# Patient Record
Sex: FEMALE | Race: WHITE | NOT HISPANIC OR LATINO | Employment: FULL TIME | URBAN - METROPOLITAN AREA
[De-identification: names, ages, dates, MRNs, and addresses within clinical notes are randomized per-mention and may not be internally consistent; named-entity substitution may affect disease eponyms.]

---

## 2017-04-11 ENCOUNTER — OFFICE VISIT (OUTPATIENT)
Dept: FAMILY MEDICINE | Facility: CLINIC | Age: 42
End: 2017-04-11
Payer: COMMERCIAL

## 2017-04-11 VITALS
HEIGHT: 65 IN | BODY MASS INDEX: 29.49 KG/M2 | RESPIRATION RATE: 14 BRPM | OXYGEN SATURATION: 98 % | HEART RATE: 76 BPM | TEMPERATURE: 99.7 F | WEIGHT: 177 LBS | SYSTOLIC BLOOD PRESSURE: 126 MMHG | DIASTOLIC BLOOD PRESSURE: 66 MMHG

## 2017-04-11 DIAGNOSIS — H01.004 BLEPHARITIS OF LEFT UPPER EYELID, UNSPECIFIED TYPE: Primary | ICD-10-CM

## 2017-04-11 DIAGNOSIS — D84.9 IMMUNE DEFICIENCY DISORDER (H): ICD-10-CM

## 2017-04-11 PROBLEM — Z13.6 CARDIOVASCULAR SCREENING; LDL GOAL LESS THAN 160: Status: ACTIVE | Noted: 2017-04-11

## 2017-04-11 PROBLEM — T78.40XA ALLERGIC STATE: Status: ACTIVE | Noted: 2017-04-11

## 2017-04-11 PROCEDURE — 99203 OFFICE O/P NEW LOW 30 MIN: CPT | Performed by: PHYSICIAN ASSISTANT

## 2017-04-11 RX ORDER — OMEGA-3S/DHA/EPA/FISH OIL 980-1400MG
2 CAPSULE,DELAYED RELEASE (ENTERIC COATED) ORAL DAILY
COMMUNITY

## 2017-04-11 RX ORDER — ELECTROLYTES/DEXTROSE
1 SOLUTION, ORAL ORAL DAILY
COMMUNITY
End: 2022-12-21

## 2017-04-11 RX ORDER — SULFACETAMIDE SODIUM 100 MG/ML
1 SOLUTION/ DROPS OPHTHALMIC
Qty: 1 BOTTLE | Refills: 0 | Status: SHIPPED | OUTPATIENT
Start: 2017-04-11 | End: 2017-04-18

## 2017-04-11 NOTE — PATIENT INSTRUCTIONS
Blepharitis    Blepharitis is an inflammation of the eyelid. It results in swelling of the eyelids, and it is usually caused by a bacterial infection or a skin condition. Blepharitis is a common eye condition. There are two types. Anterior blepharitis occurs where the eyelashes are attached (outside front edge of the eye). Posterior blepharitis affects the inner edge of the eyelid that touches the eyeball.  In addition to swollen eyelids, symptoms of blepharitis can include thick, yellow, dandruff-like scales that stick to the eyelid. There may be oily patches on the eyelid. The eyelashes may be crusted (with dandruff-like scales) when you wake up from sleeping. The irritated area may itch. The eyelids may be red. The eyes can be red and burn or sting. The eyes may tear a lot, or be dry. You can become sensitive to light or have blurred vision. Symptoms of blepharitis can cause irritability.  Blepharitis is a chronic condition and difficult to cure. Even with successful treatment, recurrences are common. Good hygiene and home treatments (in the Home care section below) can improve your condition.  Causes  Causes of blepharitis may include:    Problems with the oil glands in the eyelid (meibomian glands)    Dandruff of the scalp and eyebrows (seborrheic dermatitis)    Acne rosacea (a skin condition that causes redness of the face, and other symptoms)    Eyelash mites (tiny organisms in the eyelash follicles)    Allergic reactions to cosmetics or medicines  Home care  Medicine: The healthcare provider may prescribe an antibiotic eye drops or ointment, artificial tears, and/or steroid eye drops. Follow all instructions for using these medicines. Use all medicines as directed. If you have pain, take medicine as advised by the healthcare provider.    Wash your hands carefully with soap and warm water before and after caring for your eyes.    Apply a warm compress or a warm, moist washcloth to the eyelids for 1 minute,  2 to 3 times a day, to loosen the crust. Then, wipe away scales or crust from the eyelids.    After applying the warm compress, gently scrub the base of the eyelashes for almost 15 seconds per eyelid. To do this, close your eyes and use a moist eyelid cleansing wipe, clean washcloth, or cotton swab. Ask your healthcare provider about products (such as nonirritating baby shampoo) to use to help clean the eyelids.    You may be instructed to gently massage your eyelids to help unblock the eyelid glands. Follow all instructions given by the healthcare provider.    Unless told otherwise, on a regular basis, with eyes closed, clean your eyelids as directed by the healthcare provider. Blepharitis can be an ongoing problem.    Do not wear eye makeup until the inflammation goes away, or as directed by your healthcare provider.     Unless told otherwise, stop using contact lenses until you complete treatment for the condition.    Wash your hands regularly to help prevent dirt and bacteria from coming in contact with your eyelid.  Follow-up care  Follow up with your healthcare provider, or as advised. Your healthcare provider may refer you to an eye specialist (an optometrist or ophthalmologist) for further evaluation and treatment.  When to seek medical advice  Call your healthcare provider right away if any of these occur:    Increase in redness of the white part of the eye    Increase in swelling, redness, irritation, or pain of the eyelids    Eye pain    Change in vision (trouble seeing or blurring)    Drainage (pus, blood) from the eyelid    Fever of 100.4 F (38 C) or higher, or as directed by your healthcare provider    7827-0196 [employer]. [address] [place] [address], [place] [address]. All rights reserved. This information is not intended as a substitute for professional medical care. Always follow your healthcare professional's instructions.

## 2017-04-11 NOTE — NURSING NOTE
"Chief Complaint   Patient presents with     [facility]     new pt     Eye Problem       Initial /66 (BP Location: Right arm, Patient Position: Chair, Cuff Size: Adult Regular)  Pulse 76  Temp 99.7  F (37.6  C) (Oral)  Resp 14  Ht 5' 5.25\" (1.657 m)  Wt 177 lb (80.3 kg)  SpO2 98%  Breastfeeding? No  BMI 29.23 kg/m2 Estimated body mass index is 29.23 kg/(m^2) as calculated from the following:    Height as of this encounter: 5' 5.25\" (1.657 m).    Weight as of this encounter: 177 lb (80.3 kg).  BP completed using cuff size: regular    Health Maintenance that is potentially due pending provider review:  Health Maintenance Due   Topic Date Due     TETANUS IMMUNIZATION (SYSTEM ASSIGNED)  03/25/1995     PAP SCREENING Q3 YR (SYSTEM ASSIGNED)  03/25/1998         Pt advised to make PX appt.  "

## 2017-04-11 NOTE — MR AVS SNAPSHOT
After Visit Summary   4/11/2017    May Aldana    MRN: [id number]           Patient Information     Date Of Birth          1975        Visit Information        Provider Department      4/11/2017 3:20 PM Ean Hanks PA-C [facility]        Today's Diagnoses     Blepharitis of left upper eyelid, unspecified type    -  1      Care Instructions      Blepharitis    Blepharitis is an inflammation of the eyelid. It results in swelling of the eyelids, and it is usually caused by a bacterial infection or a skin condition. Blepharitis is a common eye condition. There are two types. Anterior blepharitis occurs where the eyelashes are attached (outside front edge of the eye). Posterior blepharitis affects the inner edge of the eyelid that touches the eyeball.  In addition to swollen eyelids, symptoms of blepharitis can include thick, yellow, dandruff-like scales that stick to the eyelid. There may be oily patches on the eyelid. The eyelashes may be crusted (with dandruff-like scales) when you wake up from sleeping. The irritated area may itch. The eyelids may be red. The eyes can be red and burn or sting. The eyes may tear a lot, or be dry. You can become sensitive to light or have blurred vision. Symptoms of blepharitis can cause irritability.  Blepharitis is a chronic condition and difficult to cure. Even with successful treatment, recurrences are common. Good hygiene and home treatments (in the Home care section below) can improve your condition.  Causes  Causes of blepharitis may include:    Problems with the oil glands in the eyelid (meibomian glands)    Dandruff of the scalp and eyebrows (seborrheic dermatitis)    Acne rosacea (a skin condition that causes redness of the face, and other symptoms)    Eyelash mites (tiny organisms in the eyelash follicles)    Allergic reactions to cosmetics or medicines  Home care  Medicine: The healthcare provider may prescribe an  antibiotic eye drops or ointment, artificial tears, and/or steroid eye drops. Follow all instructions for using these medicines. Use all medicines as directed. If you have pain, take medicine as advised by the healthcare provider.    Wash your hands carefully with soap and warm water before and after caring for your eyes.    Apply a warm compress or a warm, moist washcloth to the eyelids for 1 minute, 2 to 3 times a day, to loosen the crust. Then, wipe away scales or crust from the eyelids.    After applying the warm compress, gently scrub the base of the eyelashes for almost 15 seconds per eyelid. To do this, close your eyes and use a moist eyelid cleansing wipe, clean washcloth, or cotton swab. Ask your healthcare provider about products (such as nonirritating baby shampoo) to use to help clean the eyelids.    You may be instructed to gently massage your eyelids to help unblock the eyelid glands. Follow all instructions given by the healthcare provider.    Unless told otherwise, on a regular basis, with eyes closed, clean your eyelids as directed by the healthcare provider. Blepharitis can be an ongoing problem.    Do not wear eye makeup until the inflammation goes away, or as directed by your healthcare provider.     Unless told otherwise, stop using contact lenses until you complete treatment for the condition.    Wash your hands regularly to help prevent dirt and bacteria from coming in contact with your eyelid.  Follow-up care  Follow up with your healthcare provider, or as advised. Your healthcare provider may refer you to an eye specialist (an optometrist or ophthalmologist) for further evaluation and treatment.  When to seek medical advice  Call your healthcare provider right away if any of these occur:    Increase in redness of the white part of the eye    Increase in swelling, redness, irritation, or pain of the eyelids    Eye pain    Change in vision (trouble seeing or blurring)    Drainage (pus,  "blood) from the eyelid    Fever of 100.4 F (38 C) or higher, or as directed by your healthcare provider    1921-5687 [employer]. [address] [place] [address], [place] [address]. All rights reserved. This information is not intended as a substitute for professional medical care. Always follow your healthcare professional's instructions.              Follow-ups after your visit        Who to contact     If you have questions or need follow up information about today's clinic visit or your schedule please contact [facility] directly at [phone].  Normal or non-critical lab and imaging results will be communicated to you by mSpokehart, letter or phone within 4 business days after the clinic has received the results. If you do not hear from us within 7 days, please contact the clinic through mSpokehart or phone. If you have a critical or abnormal lab result, we will notify you by phone as soon as possible.  Submit refill requests through WebThriftStore or call your pharmacy and they will forward the refill request to us. Please allow 3 business days for your refill to be completed.          Additional Information About Your Visit        MyChart Information     Web[employer]riftStore lets you send messages to your doctor, view your test results, renew your prescriptions, schedule appointments and more. To sign up, go to [email or web address] . Click on \"Log in\" on the left side of the screen, which will take you to the Welcome page. Then click on \"Sign up Now\" on the right side of the page.     You will be asked to enter the access code listed below, as well as some personal information. Please follow the directions to create your username and password.     Your access code is: [email or web address]  Expires: 7/10/2017  3:37 PM     Your access code will  in 90 days. If you need help or a new code, please call your [facility] or [phone].        Care EveryWhere ID     This is your Care EveryWhere ID. This could be " "used by other organizations to access your [place] medical records  [id number]        Your Vitals Were     Pulse Temperature Respirations Height Pulse Oximetry Breastfeeding?    76 99.7  F (37.6  C) (Oral) 14 5' 5.25\" (1.657 m) 98% No    BMI (Body Mass Index)                   29.23 kg/m2            Blood Pressure from Last 3 Encounters:   04/11/17 126/66    Weight from Last 3 Encounters:   04/11/17 177 lb (80.3 kg)              Today, you had the following     No orders found for display         Today's Medication Changes          These changes are accurate as of: 4/11/17  3:37 PM.  If you have any questions, ask your nurse or doctor.               Start taking these medicines.        Dose/Directions    sulfacetamide 10 % ophthalmic solution   Commonly known as:  BLEPH-10   Used for:  Blepharitis of left upper eyelid, unspecified type   Started by:  Ean Hanks PA-C        Dose:  1 drop   Apply 1 drop to eye every 4 hours (while awake) for 7 days   Quantity:  1 Bottle   Refills:  0            Where to get your medicines      These medications were sent to [facility] [address] [place] [address] AT [address] [place] [id number]     Phone:  [phone]     sulfacetamide 10 % ophthalmic solution                Primary Care Provider    None Specified       No primary provider on file.        Thank you!     Thank you for choosing [facility]  for your care. Our goal is always to provide you with excellent care. Hearing back from our patients is one way we can continue to improve our services. Please take a few minutes to complete the written survey that you may receive in the mail after your visit with us. Thank you!             Your Updated Medication List - Protect others around you: Learn how to safely use, store and throw away your medicines at www.disposemymeds.org.          This list is accurate as of: 4/11/17  3:37 PM.  " Always use your most recent med list.                   Brand Name Dispense Instructions for use    ALLEGRA ALLERGY PO      Take 60 mg by mouth daily       FISH OIL + D3 0301-7387 MG-UNIT Caps      Take 2 capsules by mouth daily       MULTIVITAMIN ADULT Tabs      Take 1 tablet by mouth daily       sulfacetamide 10 % ophthalmic solution    BLEPH-10    1 Bottle    Apply 1 drop to eye every 4 hours (while awake) for 7 days       ZANTAC PO      Take 150 mg by mouth daily

## 2017-04-11 NOTE — PROGRESS NOTES
"  SUBJECTIVE:                                                    May Aldana is a 42 year old female who presents to clinic today for the following health issues:      New Patient/Transfer of Care  Chief Complaint   Patient presents with     [facility]     new pt     Eye Problem             Problem list and histories reviewed & adjusted, as indicated.  Additional history: 41 y/o NP female here to discuss an eye issue.  Over the weekend, she did wake up on Sat, and her upper eye lid was swollen, with just a mild pain with blinking.  Has not changed much since then.  She does have chronic allergies, but never really eye symptoms.  She does take allegra daily.  She did try hot wash cloth, not much help.      She has followed with immunology in the past, and was diagnosed with a non specific immune deficiency.  She has changed work, exposure, and lifestyle, and since then she has done great.  Her sister was diagnosed with a mast cell activation disorder.      She is new to health insurance, so she has not had physical in the last 10 years.  She has done very well.      BP Readings from Last 3 Encounters:   04/11/17 126/66    Wt Readings from Last 3 Encounters:   04/11/17 177 lb (80.3 kg)                    Reviewed and updated as needed this visit by clinical staff  Tobacco  Allergies  Meds  Med Hx  Surg Hx  Fam Hx  Soc Hx      Reviewed and updated as needed this visit by Provider         ROS:  Constitutional, HEENT, cardiovascular, pulmonary, gi and gu systems are negative, except as otherwise noted.    OBJECTIVE:                                                    /66 (BP Location: Right arm, Patient Position: Chair, Cuff Size: Adult Regular)  Pulse 76  Temp 99.7  F (37.6  C) (Oral)  Resp 14  Ht 5' 5.25\" (1.657 m)  Wt 177 lb (80.3 kg)  SpO2 98%  Breastfeeding? No  BMI 29.23 kg/m2  Body mass index is 29.23 kg/(m^2).  GENERAL: alert and no distress  EYES: conjunctivae and sclerae normal " and eyelids- mild swelling and erythema upper left.  HENT: ear canals and TM's normal, nose and mouth without ulcers or lesions  RESP: lungs clear to auscultation - no rales, rhonchi or wheezes  CV: regular rate and rhythm, normal S1 S2, no S3 or S4, no murmur, click or rub, no peripheral edema and peripheral pulses strong  PSYCH: mentation appears normal, affect normal/bright    Diagnostic Test Results:  none      ASSESSMENT/PLAN:                                                        BP Screening:   Last 3 BP Readings:    BP Readings from Last 3 Encounters:   04/11/17 126/66       The following was recommended to the patient:  Re-screen BP within a year and recommended lifestyle modifications    1. Blepharitis of left upper eyelid, unspecified type  At home care instructions given.  - sulfacetamide (BLEPH-10) 10 % ophthalmic solution; Apply 1 drop to eye every 4 hours (while awake) for 7 days  Dispense: 1 Bottle; Refill: 0    2. Immune deficiency disorder (H)  Has done very well the lat 10+ years, not following with any specialty now.  She recently obtained health insurance again, so did encourage her to follow up with more complete exam to update labs, pap, etc.      Follow up if symptoms persist or worsen .    Ean Hanks PA-C  [facility]

## 2018-01-19 ENCOUNTER — OFFICE VISIT (OUTPATIENT)
Dept: FAMILY MEDICINE | Facility: CLINIC | Age: 43
End: 2018-01-19
Payer: COMMERCIAL

## 2018-01-19 VITALS
OXYGEN SATURATION: 97 % | SYSTOLIC BLOOD PRESSURE: 120 MMHG | HEIGHT: 65 IN | HEART RATE: 86 BPM | WEIGHT: 177 LBS | DIASTOLIC BLOOD PRESSURE: 49 MMHG | BODY MASS INDEX: 29.49 KG/M2

## 2018-01-19 DIAGNOSIS — D80.3 IGG SUBCLASS DEFICIENCY (H): Primary | ICD-10-CM

## 2018-01-19 DIAGNOSIS — L29.9 PRURITIC DISORDER: ICD-10-CM

## 2018-01-19 PROCEDURE — 99213 OFFICE O/P EST LOW 20 MIN: CPT | Performed by: INTERNAL MEDICINE

## 2018-01-19 NOTE — MR AVS SNAPSHOT
After Visit Summary   1/19/2018    May Aldana    MRN: [id number]           Patient Information     Date Of Birth          1975        Visit Information        Provider Department      1/19/2018 3:30 PM Edna Vasquez MD [facility]        Today's Diagnoses     IgG subclass deficiency (H)    -  1      Care Instructions      Preventive Health Recommendations  Female Ages 40 to 49    Yearly exam:     See your health care provider every year in order to  1. Review health changes.   2. Discuss preventive care.    3. Review your medicines if your doctor prescribed any.      Get a Pap test every three years (unless you have an abnormal result and your provider advises testing more often).      If you get Pap tests with HPV test, you only need to test every 5 years, unless you have an abnormal result. You do not need a Pap test if your uterus was removed (hysterectomy) and you have not had cancer.      You should be tested each year for STDs (sexually transmitted diseases), if you're at risk.       Ask your doctor if you should have a mammogram.      Have a colonoscopy (test for colon cancer) if someone in your family has had colon cancer or polyps before age 50.       Have a cholesterol test every 5 years.       Have a diabetes test (fasting glucose) after age 45. If you are at risk for diabetes, you should have this test every 3 years.    Shots: Get a flu shot each year. Get a tetanus shot every 10 years.     Nutrition:     Eat at least 5 servings of fruits and vegetables each day.    Eat whole-grain bread, whole-wheat pasta and brown rice instead of white grains and rice.    Talk to your provider about Calcium and Vitamin D.     Lifestyle    Exercise at least 150 minutes a week (an average of 30 minutes a day, 5 days a week). This will help you control your weight and prevent disease.    Limit alcohol to one drink per day.    No smoking.     Wear sunscreen to prevent skin  "cancer.    See your dentist every six months for an exam and cleaning.          Follow-ups after your visit        Additional Services     ALLERGY/ASTHMA ADULT REFERRAL       Your provider has referred you to: [facility] Allergy/Asthma[facility] - [phone]  [email or web address]    Please be aware that coverage of these services is subject to the terms and limitations of your health insurance plan.  Call member services at your health plan with any benefit or coverage questions.      Please bring the following with you to your appointment:    (1) Any X-Rays, CTs or MRIs which have been performed.  Contact the facility where they were done to arrange for  prior to your scheduled appointment.    (2) List of current medications  (3) This referral request   (4) Any documents/labs given to you for this referral                  Who to contact     If you have questions or need follow up information about today's clinic visit or your schedule please contact [facility] directly at [phone].  Normal or non-critical lab and imaging results will be communicated to you by MyChart, letter or phone within 4 business days after the clinic has received the results. If you do not hear from us within 7 days, please contact the clinic through "[employer]hart or phone. If you have a critical or abnormal lab result, we will notify you by phone as soon as possible.  Submit refill requests through SportStream or call your pharmacy and they will forward the refill request to us. Please allow 3 business days for your refill to be completed.          Additional Information About Your Visit        "[employer] Information     SportStream lets you send messages to your doctor, view your test results, renew your prescriptions, schedule appointments and more. To sign up, go to [email or web address] . Click on \"Log in\" on the left side of the screen, which will take you to the Welcome page. Then " "click on \"Sign up Now\" on the right side of the page.     You will be asked to enter the access code listed below, as well as some personal information. Please follow the directions to create your username and password.     Your access code is: [email or web address]  Expires: 2018  3:48 PM     Your access code will  in 90 days. If you need help or a new code, please call your [facility] clinic or [phone].        Care EveryWhere ID     This is your Care EveryWhere ID. This could be used by other organizations to access your [facility] medical records  [id number]        Your Vitals Were     Pulse Height Last Period Pulse Oximetry Breastfeeding? BMI (Body Mass Index)    86 5' 5.25\" (1.657 m) 2018 97% No 29.23 kg/m2       Blood Pressure from Last 3 Encounters:   18 120/49   17 126/66    Weight from Last 3 Encounters:   18 177 lb (80.3 kg)   17 177 lb (80.3 kg)              We Performed the Following     ALLERGY/ASTHMA ADULT REFERRAL          Today's Medication Changes          These changes are accurate as of: 18  3:48 PM.  If you have any questions, ask your nurse or doctor.               Stop taking these medicines if you haven't already. Please contact your care team if you have questions.     ALLEGRA ALLERGY PO   Stopped by:  Edna Vasquez MD                    Primary Care Provider Office Phone # Fax #    Edna Elie Vasquez [phone] [address] [address]        Equal Access to Services     [facility]: Hadii everardo Whalen, waaxda luqadaha, qaybta kaalbianca [place]. So [facility] [phone].    ATENCIÓN: Si habla español, tiene a hanna disposición servicios gratuitos de asistencia lingüística. Llame al [phone].    We comply with applicable federal civil rights laws and [place] laws. We do not discriminate on the basis of race, color, national origin, age, disability, sex, " sexual orientation, or gender identity.            Thank you!     Thank you for choosing [facility]  for your care. Our goal is always to provide you with excellent care. Hearing back from our patients is one way we can continue to improve our services. Please take a few minutes to complete the written survey that you may receive in the mail after your visit with us. Thank you!             Your Updated Medication List - Protect others around you: Learn how to safely use, store and throw away your medicines at www.disposemymeds.org.          This list is accurate as of: 1/19/18  3:48 PM.  Always use your most recent med list.                   Brand Name Dispense Instructions for use Diagnosis    FISH OIL + D3 [id number] MG-UNIT Caps      Take 2 capsules by mouth daily        MULTIVITAMIN ADULT Tabs      Take 1 tablet by mouth daily        XYZAL ALLERGY 24HR PO      Take 1 tablet by mouth daily as needed        ZANTAC PO      Take 150 mg by mouth daily

## 2018-01-19 NOTE — PROGRESS NOTES
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 Regarding the patient's family medical history, the patient's sister was recently diagnosed with MCAS. Patient is concerned that she may have this syndrome also. Patient is requesting a referral to an allergy specialist clinic with [facility] and [facility].      Current Medications:     Current Outpatient Prescriptions   Medication Sig Dispense Refill     Levocetirizine Dihydrochloride (XYZAL ALLERGY 24HR PO) Take 1 tablet by mouth daily as needed       RaNITidine HCl (ZANTAC PO) Take 150 mg by mouth daily       Multiple Vitamins-Minerals (MULTIVITAMIN ADULT) TABS Take 1 tablet by mouth daily       Fish Oil-Cholecalciferol (FISH OIL + D3) 4678-5170 MG-UNIT CAPS Take 2 capsules by mouth daily           Allergies:      Allergies   Allergen Reactions     Seasonal Allergies      Latex Rash            Past Medical History:   No past medical history on file.      Past Surgical History:     Past Surgical History:   Procedure Laterality Date     TONSILLECTOMY & ADENOIDECTOMY  1987         Family Medical History:     Family History   Problem Relation Age of Onset     Hypertension Mother      Colon Polyps Mother      Genetic Disorder Mother      Lupus Mother      Chronic Obstructive Pulmonary Disease Mother      CLOTTING DISORDER Mother      Seasonal/Environmental Allergies Mother      Other - See Comments Mother      circulation problems, ovarian tumor, Mast Cell Activation Disorder, common variable immuno-deficiency     Seizure Disorder Mother      Migraines Mother      Substance Abuse Father      Heart Failure Maternal Grandmother      CLOTTING DISORDER  "Maternal Grandmother      Coronary Artery Disease Maternal Grandmother      DIABETES Maternal Grandmother      Hypertension Maternal Grandmother      Other - See Comments Maternal Grandmother      circulation problems     Other Cancer Maternal Grandfather      Leukemia Maternal Grandfather      Heart Failure Paternal Grandfather      Anesthesia Reaction Sister      Thyroid Disease Sister      Other - See Comments Sister      immune disorder,      Seasonal/Environmental Allergies Sister      CLOTTING DISORDER Sister      Migraines Sister      Seizure Disorder Sister          Social History:     Social History     Social History     Marital status:      Spouse name: N/A     Number of children: N/A     Years of education: N/A     Occupational History     Not on file.     Social History Main Topics     Smoking status: Never Smoker     Smokeless tobacco: Never Used     Alcohol use Yes      Comment: ~ 2 drinks per week      Drug use: No     Sexual activity: Yes     Partners: Male     Birth control/ protection: Condom     Other Topics Concern     Parent/Sibling W/ Cabg, Mi Or Angioplasty Before 65f 55m? No     Social History Narrative           Review of System:     Constitutional: Negative for fever or chills  Skin: Negative for rashes, positive for chronic pruritus of the skin  Ears/Nose/Throat: Negative for nasal congestion, sore throat  Respiratory: No shortness of breath, dyspnea on exertion, cough, or hemoptysis  Cardiovascular: Negative for chest pain  Gastrointestinal: Negative for nausea, vomiting  Genitourinary: Negative for dysuria, hematuria  Musculoskeletal: Negative for myalgias  Neurologic: Negative for headaches  Psychiatric: Negative for depression, anxiety  Hematologic/Lymphatic/Immunologic: Positive for chronic IgG subclass deficiency  Endocrine: Negative  Behavioral: Negative for tobacco use       Physical Exam:   /49 (Cuff Size: Adult Regular)  Pulse 86  Ht 5' 5.25\" (1.657 m)  Wt 177 lb " (80.3 kg)  LMP 01/13/2018  SpO2 97%  Breastfeeding? No  BMI 29.23 kg/m2    GENERAL: alert and no distress  EYES: eyes grossly normal to inspection, and conjunctivae and sclerae normal  HENT: Normocephalic atraumatic. Nose and mouth without ulcers or lesions  NECK: supple  RESP: lungs clear to auscultation   CV: regular rate and rhythm, normal S1 S2  LYMPH: no peripheral edema   ABDOMEN: nondistended  MS: no gross musculoskeletal defects noted  SKIN: no suspicious lesions or rashes. Diffuse pruritus of the skin is noted.  NEURO: Alert & Oriented x 3.   PSYCH: mentation appears normal, affect normal          ASSESSMENT/PLAN:       (D80.3) IgG subclass deficiency (H)  (primary encounter diagnosis)  (L29.9) Pruritic disorder  Comment: Patient has a chronic history of IgG subclass deficiency. Patient also has a chronic history of pruritic disorder of the skin. Patient is requesting for a referral to [facility] and [facility] in [place] for a 2nd opinion.  Plan: I have ordered ALLERGY/ASTHMA ADULT REFERRAL with both the [facility] and [facility] for further evaluation and management going forward. In the meantime, the patient is advised to continue OTC benadryl treatment of chronic pruritus of the skin.              Follow Up Plan:     Patient is instructed to return to Internal Medicine clinic for follow-up visit on an as-needed basis going forward.        Edna Vasquez MD  Internal Medicine  [facility]

## 2018-01-19 NOTE — NURSING NOTE
"Chief Complaint   Patient presents with     [facility] Care     Patient Request     test for autoimmune  disease, sister recently diagnosed        Initial /49 (Cuff Size: Adult Regular)  Pulse 86  Ht 5' 5.25\" (1.657 m)  Wt 177 lb (80.3 kg)  LMP 01/13/2018  SpO2 97%  Breastfeeding? No  BMI 29.23 kg/m2 Estimated body mass index is 29.23 kg/(m^2) as calculated from the following:    Height as of this encounter: 5' 5.25\" (1.657 m).    Weight as of this encounter: 177 lb (80.3 kg).  Medication Reconciliation: complete    "

## 2018-01-20 ENCOUNTER — HEALTH MAINTENANCE LETTER (OUTPATIENT)
Age: 43
End: 2018-01-20

## 2018-01-25 NOTE — TELEPHONE ENCOUNTER
APPT INFO    Date /Time: 1/29/18, 12:35pm   Reason for Appt: IgG subclass deficiency, allergy testing   Ref Provider/Clinic: [facility]   Are there internal records? Yes/No?  IF YES, list clinic names: [facility]   Are there outside records? Yes/No? no   Patient Contact (Y/N) & Call Details: No, referred    Action:

## 2018-01-27 ASSESSMENT — ENCOUNTER SYMPTOMS
PARALYSIS: 0
VOMITING: 0
SNORES LOUDLY: 0
EXERCISE INTOLERANCE: 1
WEAKNESS: 1
PALPITATIONS: 1
SINUS PAIN: 1
HALLUCINATIONS: 0
LOSS OF CONSCIOUSNESS: 0
NECK MASS: 0
STIFFNESS: 1
SLEEP DISTURBANCES DUE TO BREATHING: 0
ABDOMINAL PAIN: 0
DIARRHEA: 1
SORE THROAT: 0
HOARSE VOICE: 0
HEMOPTYSIS: 0
HOT FLASHES: 0
TINGLING: 0
LIGHT-HEADEDNESS: 1
POSTURAL DYSPNEA: 0
BLOATING: 1
INCREASED ENERGY: 1
DECREASED APPETITE: 1
JAUNDICE: 0
SMELL DISTURBANCE: 1
INSOMNIA: 1
PANIC: 0
HEARTBURN: 1
SEIZURES: 0
DIZZINESS: 1
NAIL CHANGES: 0
BRUISES/BLEEDS EASILY: 1
ORTHOPNEA: 0
HEADACHES: 1
ALTERED TEMPERATURE REGULATION: 1
NERVOUS/ANXIOUS: 0
BLOOD IN STOOL: 0
POLYPHAGIA: 0
ARTHRALGIAS: 1
DYSPNEA ON EXERTION: 1
SPUTUM PRODUCTION: 0
DIFFICULTY URINATING: 0
DYSURIA: 1
LEG PAIN: 0
FEVER: 1
EYE REDNESS: 1
SKIN CHANGES: 0
HEMATURIA: 0
HYPERTENSION: 0
WHEEZING: 1
HYPOTENSION: 1
COUGH: 1
MEMORY LOSS: 0
RECTAL PAIN: 0
EYE IRRITATION: 1
JOINT SWELLING: 0
DECREASED LIBIDO: 1
MUSCLE WEAKNESS: 1
BACK PAIN: 1
NUMBNESS: 0
DISTURBANCES IN COORDINATION: 0
SHORTNESS OF BREATH: 1
DEPRESSION: 0
NECK PAIN: 0
DECREASED CONCENTRATION: 1
SPEECH CHANGE: 0
COUGH DISTURBING SLEEP: 0
DOUBLE VISION: 0
MYALGIAS: 1
SINUS CONGESTION: 1
TROUBLE SWALLOWING: 0
CHILLS: 1
TREMORS: 1
CONSTIPATION: 0
MUSCLE CRAMPS: 0
EYE PAIN: 0
TASTE DISTURBANCE: 1
SYNCOPE: 0
POLYDIPSIA: 0
FATIGUE: 1
BOWEL INCONTINENCE: 0
NAUSEA: 1
WEIGHT LOSS: 0
POOR WOUND HEALING: 0
NIGHT SWEATS: 1
WEIGHT GAIN: 0
FLANK PAIN: 0
EYE WATERING: 0

## 2018-01-29 ENCOUNTER — PRE VISIT (OUTPATIENT)
Dept: PULMONOLOGY | Facility: CLINIC | Age: 43
End: 2018-01-29

## 2018-01-29 ENCOUNTER — OFFICE VISIT (OUTPATIENT)
Dept: PULMONOLOGY | Facility: CLINIC | Age: 43
End: 2018-01-29
Attending: ALLERGY & IMMUNOLOGY
Payer: COMMERCIAL

## 2018-01-29 VITALS
RESPIRATION RATE: 16 BRPM | DIASTOLIC BLOOD PRESSURE: 71 MMHG | HEIGHT: 65 IN | HEART RATE: 79 BPM | SYSTOLIC BLOOD PRESSURE: 137 MMHG | WEIGHT: 177 LBS | BODY MASS INDEX: 29.49 KG/M2 | OXYGEN SATURATION: 100 %

## 2018-01-29 DIAGNOSIS — B99.9 RECURRENT INFECTIONS: ICD-10-CM

## 2018-01-29 DIAGNOSIS — B99.9 RECURRENT INFECTIONS: Primary | ICD-10-CM

## 2018-01-29 DIAGNOSIS — L29.9 GENERALIZED PRURITUS: ICD-10-CM

## 2018-01-29 DIAGNOSIS — D84.9 IMMUNODEFICIENCY (H): ICD-10-CM

## 2018-01-29 PROCEDURE — G0463 HOSPITAL OUTPT CLINIC VISIT: HCPCS | Mod: ZF

## 2018-01-29 PROCEDURE — 82784 ASSAY IGA/IGD/IGG/IGM EACH: CPT | Performed by: ALLERGY & IMMUNOLOGY

## 2018-01-29 PROCEDURE — 36415 COLL VENOUS BLD VENIPUNCTURE: CPT | Performed by: ALLERGY & IMMUNOLOGY

## 2018-01-29 ASSESSMENT — PAIN SCALES - GENERAL: PAINLEVEL: NO PAIN (0)

## 2018-01-29 NOTE — NURSING NOTE
Chief Complaint   Patient presents with     Consult     IgG subclass deficiency, allergy testing    Julian Ojeda, WAGNER

## 2018-01-29 NOTE — MR AVS SNAPSHOT
After Visit Summary   1/29/2018    May Aldana    MRN: [id number]           Patient Information     Date Of Birth          1975        Visit Information        Provider Department      1/29/2018 12:35 PM Joaquín Marie MD [facility]        Today's Diagnoses     Recurrent infections    -  1    Generalized pruritus          Care Instructions    Increase anti histamine to Allegra 1 tab twice daily (if symptoms not controlled, can increase all the way up to 2 tabs twice a day).   Continue Ranitidine   Continue benadryl as needed.     See [place] and decide if you'd like to follow up with us or them after their evaluation.     We will test IgG level today.           Follow-ups after your visit        Follow-up notes from your care team     Return if symptoms worsen or fail to improve.      Your next 10 appointments already scheduled     Jan 29, 2018  1:45 PM CST   Lab with  LAB   [facility] ([facility] [place])    [address] [place] [id number] [phone]              Future tests that were ordered for you today     Open Future Orders        Priority Expected Expires Ordered    IgG Routine 1/29/2018 2/28/2018 1/29/2018            Who to contact     If you have questions or need follow up information about today's clinic visit or your schedule please contact [facility] directly at [phone].  Normal or non-critical lab and imaging results will be communicated to you by MyChart, letter or phone within 4 business days after the clinic has received the results. If you do not hear from us within 7 days, please contact the clinic through MyChart or phone. If you have a critical or abnormal lab result, we will notify you by phone as soon as possible.  Submit refill requests through [employer] or call your pharmacy and they will forward the refill request to us. Please allow  "3 business days for your refill to be completed.          Additional Information About Your Visit        MyChart Information     [employer] gives you secure access to your electronic health record. If you see a primary care provider, you can also send messages to your care team and make appointments. If you have questions, please call your primary care clinic.  If you do not have a primary care provider, please call [phone] and they will assist you.        Care EveryWhere ID     This is your Care EveryWhere ID. This could be used by other organizations to access your [facility] medical records  [id number]        Your Vitals Were     Pulse Respirations Height Last Period Pulse Oximetry BMI (Body Mass Index)    79 16 1.657 m (5' 5.25\") 01/13/2018 100% 29.23 kg/m2       Blood Pressure from Last 3 Encounters:   01/29/18 137/71   01/19/18 120/49   04/11/17 126/66    Weight from Last 3 Encounters:   01/29/18 80.3 kg (177 lb)   01/19/18 80.3 kg (177 lb)   04/11/17 80.3 kg (177 lb)               Primary Care Provider Office Phone # Fax #    Edna Elie Vasquez [phone] [address] [address]        Equal Access to Services     TWILA TOVAR AH: Hadii aad ku hadasho Soomaali, waaxda luqadaha, qaybta kaalmada adeegyada, bianca sidhu hayfiona jaimes . So [facility] [phone].    ATENCIÓN: Si habla español, tiene a hanna disposición servicios gratuitos de asistencia lingüística. [facility] [phone].    We comply with applicable federal civil rights laws and [place] laws. We do not discriminate on the basis of race, color, national origin, age, disability, sex, sexual orientation, or gender identity.            Thank you!     Thank you for choosing [facility]  for your care. Our goal is always to provide you with excellent care. Hearing back from our patients is one way we can continue to improve our services. Please take a few minutes to " complete the written survey that you may receive in the mail after your visit with us. Thank you!             Your Updated Medication List - Protect others around you: Learn how to safely use, store and throw away your medicines at www.disposemymeds.org.          This list is accurate as of 1/29/18  1:26 PM.  Always use your most recent med list.                   Brand Name Dispense Instructions for use Diagnosis    FISH OIL + D3 0915-8018 MG-UNIT Caps      Take 2 capsules by mouth daily        MULTIVITAMIN ADULT Tabs      Take 1 tablet by mouth daily        XYZAL ALLERGY 24HR PO      Take 1 tablet by mouth daily as needed        ZANTAC PO      Take 150 mg by mouth daily

## 2018-01-29 NOTE — PATIENT INSTRUCTIONS
Increase anti histamine to Allegra 1 tab twice daily (if symptoms not controlled, can increase all the way up to 2 tabs twice a day).   Continue Ranitidine   Continue benadryl as needed.     See [place] and decide if you'd like to follow up with us or them after their evaluation.     We will test IgG level today.

## 2018-01-29 NOTE — PROGRESS NOTES
Reason for Visit  May Aldana is a 42 year old female who is referred by Edna Vasquez for allergy evaluation/ concern for immunodeficiency     Allergy HPI  Ms. Barbara Salguero is a 42 year old woman without significant past history (possible history of non-specific immunodeficiency, but no specific diagnosis) who presents for evaluation of possible immunodeficiency and rash. 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 Marlon Dyer at [facility] subsequently. She lost health insurance, unfortunately, and was lost to follow up. She now has insurance, and would like to re-establish care.     Of note, her sister was diagnosed with mast cell activation syndrome last year.     She has experienced some changes in the last year or so. She has had itching and rashes with flushing on her skin more than usual over the past year. Rash and pruritis typically occurs on the upper arms, thighs, and torso. She also notes some nausea, lightheadedness, and palpitations that seem to occur in waves. Typically itching occurs first, then other symptoms follow. She also has some fogginess of thought with this. She wakes up from sleep with itchiness as well. These symptoms are occurring a couple times a day, and lasts 20 minutes or so. Every couple weeks, she will have a more severe episode where she gets quite fatigued afterward as well, associated with a severe pressure headache. When episode occur, she typically takes benadryl, with relatively rapid relief. This week, without taking benadryl and using ice packs, symptoms resolved after about 20 minutes or so. She also endorses some joint stiffness and pain, as well as swelling of the feet that occurs with more severe reactions. Sometimes, she has some associated chest tightness, which over the last couple weeks has been more persistent, with some wheezing. She also gets some headaches and light sensitivity with all of  this.     Regarding possible triggers, she has noted that scents/perfumes can trigger these symptoms. Most of the time symptoms occur after eating, specifically aged cheeses, red tomato sauce, and kombucha. She also notes more itching around cardboard boxes. She also notes symptoms after significant exercise and after hot water exposure (showering).     She occasionally wakes up congested, but more often has chronic rhinorrhea. She has had a cough for a couple weeks as well, which is mostly dry. She denies swelling of the tongue or throat. She is somewhat short of breath.     She does not have a history of asthma. She previously was given an Advair inhaler by Dr. Dyer for a history of recurrent pneumonia. She has not been on this for years.     Regarding infectious history, her mother has Lupus and her sister has CVID and mast cell activation syndrome. She was concerned for trouble because after spending time in [place], she returned with Typhoid and Tuberculosis. She had previously been vaccinated against Typhoid. She also had a recurrent history of sinus infection, UTI, and colds.     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 She lives in a home with her , and denies significant mold or dust exposure. She has a dog at home. She does do some hiking and swimming, and otherwise stays inside.     She typically takes Xyzal, Zantac, and benadryl, but not over the last week.     The patient was seen and examined by Jeremy Ashley MD   Current Outpatient Prescriptions   Medication     Levocetirizine Dihydrochloride (XYZAL ALLERGY 24HR PO)     RaNITidine HCl (ZANTAC PO)     Multiple Vitamins-Minerals (MULTIVITAMIN ADULT) TABS     Fish Oil-Cholecalciferol (FISH OIL + D3) 4310-1447 MG-UNIT CAPS     No current facility-administered medications for this visit.      Allergies   Allergen Reactions     Seasonal Allergies      Latex Rash     Social History     Social History     Marital  "status:      Spouse name: N/A     Number of children: N/A     Years of education: N/A     Occupational History     Not on file.     Social History Main Topics     Smoking status: Never Smoker     Smokeless tobacco: Never Used     Alcohol use Yes      Comment: ~ 2 drinks per week      Drug use: No     Sexual activity: Yes     Partners: Male     Birth control/ protection: Condom     Other Topics Concern     Parent/Sibling W/ Cabg, Mi Or Angioplasty Before 65f 55m? No     Social History Narrative     History reviewed. No pertinent past medical history.  Past Surgical History:   Procedure Laterality Date     TONSILLECTOMY & ADENOIDECTOMY  1987     Family History   Problem Relation Age of Onset     Hypertension Mother      Colon Polyps Mother      Genetic Disorder Mother      Lupus Mother      Chronic Obstructive Pulmonary Disease Mother      CLOTTING DISORDER Mother      Seasonal/Environmental Allergies Mother      Other - See Comments Mother      circulation problems, ovarian tumor, Mast Cell Activation Disorder, common variable immuno-deficiency     Seizure Disorder Mother      Migraines Mother      Substance Abuse Father      Heart Failure Maternal Grandmother      CLOTTING DISORDER Maternal Grandmother      Coronary Artery Disease Maternal Grandmother      DIABETES Maternal Grandmother      Hypertension Maternal Grandmother      Other - See Comments Maternal Grandmother      circulation problems     Other Cancer Maternal Grandfather      Leukemia Maternal Grandfather      Heart Failure Paternal Grandfather      Anesthesia Reaction Sister      Thyroid Disease Sister      Other - See Comments Sister      immune disorder,      Seasonal/Environmental Allergies Sister      CLOTTING DISORDER Sister      Migraines Sister      Seizure Disorder Sister          ROS   A complete ROS was otherwise negative except as noted in the HPI and the end of the note.  /71  Pulse 79  Resp 16  Ht 1.657 m (5' 5.25\")  Wt " 80.3 kg (177 lb)  LMP 01/13/2018  SpO2 100%  BMI 29.23 kg/m2  Exam:   GENERAL APPEARANCE: Well developed, well nourished, alert, and in no apparent distress.  EYES: PERRL, EOMI, conjunctiva clear non-injected  HENT: Nasal mucosa with no edema, but right lateral nares with small white discharge. No nasal polyps.  No facial tenderness.  EARS: Canals clear, TM with scant sclerosis present.   MOUTH: Oral mucosa is moist, without any lesions, no tonsillar enlargement, no oropharyngeal exudate.  NECK: Supple, no masses, no thyromegaly.  LYMPHATICS: No significant cervical, or supraclavicular nodes.  RESP: Good air flow throughout.  No crackles. No rhonchi. No wheezes.  CV: Normal S1, S2, regular rhythm, normal rate. No murmur.  No rub. No gallop. No LE edema.   MS: Extremities normal. No clubbing. No cyanosis.  SKIN: No rashes noted  NEURO: Speech normal, normal strength and tone, normal gait and stance  PSYCH: Normal mentation, orientation to person, place, and time.  Results:  December 2001 IgG subclasses normal. IgE 11    Assessment and plan:   Ms. Barbara Salguero is a 42 year old woman without significant past history (possible history of non-specific immunodeficiency, but no specific diagnosis) who presents for evaluation of possible immunodeficiency and rash. She previously followed with immunology here and at an OSH, but has not seen anyone for years. In regards to possible history of immunodeficiency, diagnosis is unclear. Prior lab results we have from 2001 do not show IgG subclass deficiency or total IgG deficiency. Her IgE at that time as also normal. She states her sister was diagnosed with CVID and she has some autoimmune history in the family as well (mother with lupus). It is possible a condition developed after the labs we have available were drawn, however she does not have a marked history of recent infections to support this. Furthermore, it is unclear if IgG subclass deficiency needs treatment at all  (if she were to have this). We will test total IgG level at this time.   Regarding her rash and pruritis, the cause is unclear. She has many associated symptoms including chest tightness, lightheadedness, leg swelling, nausea, and rhinorrhea. There is no clear trigger for these episodes. These may be relate to a allergic/mast cell mediated reaction to a stressor (like chronic urticaria without urticaria), could be due to vasodilation (occasionally associated with pruritis), or could be unrelated events. At this time, we recommend increasing treatment with antihistamines to try to control symptoms, as she does get some benefit to antihistamines. We will have her stop Levo cetirizine, and start Allegra 1 tab BID, which could be increased to 2 tabs BID if needed. She can continue ranitidine and Benadryl if needed. She is planning on seeing immunology at the [facility] on March 8th. After this visit, she may want to follow up with our clinic, or them, depending on her preference. We will contact her with the results of today's lab testing.   Seen and discussed with Dr. Marie.     Jeremy Ashley MD  Pulmonary and Critical Care Fellow  Pager: [phone]     Physician Attestation   I, Joaquín Marie, saw this patient with the resident and agree with the resident s findings and plan of care as documented in the resident s note.        Joaquín Marie  Date of Service (when I saw the patient): Jan 29, 2018      IgG 588 which is lower than ideal.  Will recommend testing to vaccine responses.   Addendum: Vaccine response to haemophilus, tetanus, diptheria, and pneumococcal is adequate.  I do not feel that other treatments are necessary at this time.     Answers for HPI/ROS submitted by the patient on 1/27/2018   General Symptoms: Yes  Skin Symptoms: Yes  HENT Symptoms: Yes  EYE SYMPTOMS: Yes  HEART SYMPTOMS: Yes  LUNG SYMPTOMS: Yes  INTESTINAL SYMPTOMS: Yes  URINARY SYMPTOMS: Yes  GYNECOLOGIC  SYMPTOMS: Yes  BREAST SYMPTOMS: No  SKELETAL SYMPTOMS: Yes  BLOOD SYMPTOMS: Yes  NERVOUS SYSTEM SYMPTOMS: Yes  MENTAL HEALTH SYMPTOMS: Yes  Fever: Yes  Loss of appetite: Yes  Weight loss: No  Weight gain: No  Fatigue: Yes  Night sweats: Yes  Chills: Yes  Increased stress: No  Excessive hunger: No  Excessive thirst: No  Feeling hot or cold when others believe the temperature is normal: Yes  Loss of height: No  Post-operative complications: No  Surgical site pain: No  Hallucinations: No  Change in or Loss of Energy: Yes  Hyperactivity: No  Confusion: No  Changes in hair: No  Changes in moles/birth marks: No  Itching: Yes  Rashes: Yes  Changes in nails: No  Acne: No  Hair in places you don't want it: No  Change in facial hair: No  Warts: No  Non-healing sores: No  Scarring: No  Flaking of skin: No  Color changes of hands/feet in cold : Yes  Sun sensitivity: No  Skin thickening: No  Ear pain: Yes  Ear discharge: No  Hearing loss: No  Tinnitus: Yes  Nosebleeds: No  Congestion: Yes  Sinus pain: Yes  Trouble swallowing: No   Voice hoarseness: No  Mouth sores: No  Sore throat: No  Tooth pain: No  Gum tenderness: No  Bleeding gums: No  Change in taste: Yes  Change in sense of smell: Yes  Dry mouth: No  Hearing aid used: No  Neck lump: No  Eye pain: No  Vision loss: Yes  Dry eyes: Yes  Watery eyes: No  Eye bulging: No  Double vision: No  Flashing of lights: No  Spots: No  Floaters: Yes  Redness: Yes  Crossed eyes: No  Tunnel Vision: No  Yellowing of eyes: No  Eye irritation: Yes  Cough: Yes  Sputum or phlegm: No  Coughing up blood: No  Difficulty breating or shortness of breath: Yes  Snoring: No  Wheezing: Yes  Difficulty breathing on exertion: Yes  Nighttime Cough: No  Difficulty breathing when lying flat: No  Chest pain or pressure: Yes  Fast or irregular heartbeat: Yes  Pain in legs with walking: No  Trouble breathing while lying down: No  Fingers or toes appear blue: No  High blood pressure: No  Low blood pressure:  Yes  Fainting: No  Murmurs: No  Pacemaker: No  Varicose veins: No  Edema or swelling: No  Wake up at night with shortness of breath: No  Light-headedness: Yes  Exercise intolerance: Yes  Heart burn or indigestion: Yes  Nausea: Yes  Vomiting: No  Abdominal pain: No  Bloating: Yes  Constipation: No  Diarrhea: Yes  Blood in stool: No  Black stools: No  Rectal or Anal pain: No  Fecal incontinence: No  Yellowing of skin or eyes: No  Vomit with blood: No  Change in stools: No  Trouble holding urine or incontinence: Yes  Pain or burning: Yes  Trouble starting or stopping: No  Increased frequency of urination: No  Blood in urine: No  Decreased frequency of urination: No  Frequent nighttime urination: Yes  Flank pain: No  Difficulty emptying bladder: No  Back pain: Yes  Muscle aches: Yes  Neck pain: No  Swollen joints: No  Joint pain: Yes  Bone pain: No  Muscle cramps: No  Muscle weakness: Yes  Joint stiffness: Yes  Bone fracture: No  Anemia: No  Easy bleeding or bruising: Yes  Trouble with coordination: No  Dizziness or trouble with balance: Yes  Fainting or black-out spells: No  Memory loss: No  Headache: Yes  Seizures: No  Speech problems: No  Tingling: No  Tremor: Yes  Weakness: Yes  Difficulty walking: No  Paralysis: No  Numbness: No  Bleeding or spotting between periods: No  Heavy or painful periods: No  Irregular periods: No  Vaginal discharge: No  Hot flashes: No  Vaginal dryness: No  Genital ulcers: No  Reduced libido: Yes  Painful intercourse: Yes  Difficulty with sexual arousal: No  Post-menopausal bleeding: No  Nervous or Anxious: No  Depression: No  Trouble sleeping: Yes  Trouble thinking or concentrating: Yes  Mood changes: No  Panic attacks: No

## 2018-01-29 NOTE — LETTER
1/29/2018       RE: May Aldana  [address]  [place] [address]     Dear Colleague,    Thank you for referring your patient, May Aldana, to the [facility] at [facility]. Please see a copy of my visit note below.    Reason for Visit  May Aldana is a 42 year old female who is referred by Edna Vasquez for allergy evaluation/ concern for immunodeficiency     Allergy HPI  Ms. Barbara Salguero is a 42 year old woman without significant past history (possible history of non-specific immunodeficiency, but no specific diagnosis) who presents for evaluation of possible immunodeficiency and rash. 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 Marlon Dyer at [facility] subsequently. She lost health insurance, unfortunately, and was lost to follow up. She now has insurance, and would like to re-establish care.     Of note, her sister was diagnosed with mast cell activation syndrome last year.     She has experienced some changes in the last year or so. She has had itching and rashes with flushing on her skin more than usual over the past year. Rash and pruritis typically occurs on the upper arms, thighs, and torso. She also notes some nausea, lightheadedness, and palpitations that seem to occur in waves. Typically itching occurs first, then other symptoms follow. She also has some fogginess of thought with this. She wakes up from sleep with itchiness as well. These symptoms are occurring a couple times a day, and lasts 20 minutes or so. Every couple weeks, she will have a more severe episode where she gets quite fatigued afterward as well, associated with a severe pressure headache. When episode occur, she typically takes benadryl, with relatively rapid relief. This week, without taking benadryl and using ice packs, symptoms resolved after about 20 minutes or  so. She also endorses some joint stiffness and pain, as well as swelling of the feet that occurs with more severe reactions. Sometimes, she has some associated chest tightness, which over the last couple weeks has been more persistent, with some wheezing. She also gets some headaches and light sensitivity with all of this.     Regarding possible triggers, she has noted that scents/perfumes can trigger these symptoms. Most of the time symptoms occur after eating, specifically aged cheeses, red tomato sauce, and kombucha. She also notes more itching around cardboard boxes. She also notes symptoms after significant exercise and after hot water exposure (showering).     She occasionally wakes up congested, but more often has chronic rhinorrhea. She has had a cough for a couple weeks as well, which is mostly dry. She denies swelling of the tongue or throat. She is somewhat short of breath.     She does not have a history of asthma. She previously was given an Advair inhaler by Dr. Dyer for a history of recurrent pneumonia. She has not been on this for years.     Regarding infectious history, her mother has Lupus and her sister has CVID and mast cell activation syndrome. She was concerned for trouble because after spending time in [place], she returned with Typhoid and Tuberculosis. She had previously been vaccinated against Typhoid. She also had a recurrent history of sinus infection, UTI, and colds.     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 She lives in a home with her , and denies significant mold or dust exposure. She has a dog at home. She does do some hiking and swimming, and otherwise stays inside.     She typically takes Xyzal, Zantac, and benadryl, but not over the last week.     The patient was seen and examined by Jeremy Ashley MD   Current Outpatient Prescriptions   Medication     Levocetirizine Dihydrochloride (XYZAL ALLERGY 24HR PO)     RaNITidine HCl (ZANTAC PO)      Multiple Vitamins-Minerals (MULTIVITAMIN ADULT) TABS     Fish Oil-Cholecalciferol (FISH OIL + D3) 1764-8075 MG-UNIT CAPS     No current facility-administered medications for this visit.      Allergies   Allergen Reactions     Seasonal Allergies      Latex Rash     Social History     Social History     Marital status:      Spouse name: N/A     Number of children: N/A     Years of education: N/A     Occupational History     Not on file.     Social History Main Topics     Smoking status: Never Smoker     Smokeless tobacco: Never Used     Alcohol use Yes      Comment: ~ 2 drinks per week      Drug use: No     Sexual activity: Yes     Partners: Male     Birth control/ protection: Condom     Other Topics Concern     Parent/Sibling W/ Cabg, Mi Or Angioplasty Before 65f 55m? No     Social History Narrative     History reviewed. No pertinent past medical history.  Past Surgical History:   Procedure Laterality Date     TONSILLECTOMY & ADENOIDECTOMY  1987     Family History   Problem Relation Age of Onset     Hypertension Mother      Colon Polyps Mother      Genetic Disorder Mother      Lupus Mother      Chronic Obstructive Pulmonary Disease Mother      CLOTTING DISORDER Mother      Seasonal/Environmental Allergies Mother      Other - See Comments Mother      circulation problems, ovarian tumor, Mast Cell Activation Disorder, common variable immuno-deficiency     Seizure Disorder Mother      Migraines Mother      Substance Abuse Father      Heart Failure Maternal Grandmother      CLOTTING DISORDER Maternal Grandmother      Coronary Artery Disease Maternal Grandmother      DIABETES Maternal Grandmother      Hypertension Maternal Grandmother      Other - See Comments Maternal Grandmother      circulation problems     Other Cancer Maternal Grandfather      Leukemia Maternal Grandfather      Heart Failure Paternal Grandfather      Anesthesia Reaction Sister      Thyroid Disease Sister      Other - See Comments Sister   "    immune disorder,      Seasonal/Environmental Allergies Sister      CLOTTING DISORDER Sister      Migraines Sister      Seizure Disorder Sister          ROS   A complete ROS was otherwise negative except as noted in the HPI and the end of the note.  /71  Pulse 79  Resp 16  Ht 1.657 m (5' 5.25\")  Wt 80.3 kg (177 lb)  LMP 01/13/2018  SpO2 100%  BMI 29.23 kg/m2  Exam:   GENERAL APPEARANCE: Well developed, well nourished, alert, and in no apparent distress.  EYES: PERRL, EOMI, conjunctiva clear non-injected  HENT: Nasal mucosa with no edema, but right lateral nares with small white discharge. No nasal polyps.  No facial tenderness.  EARS: Canals clear, TM with scant sclerosis present.   MOUTH: Oral mucosa is moist, without any lesions, no tonsillar enlargement, no oropharyngeal exudate.  NECK: Supple, no masses, no thyromegaly.  LYMPHATICS: No significant cervical, or supraclavicular nodes.  RESP: Good air flow throughout.  No crackles. No rhonchi. No wheezes.  CV: Normal S1, S2, regular rhythm, normal rate. No murmur.  No rub. No gallop. No LE edema.   MS: Extremities normal. No clubbing. No cyanosis.  SKIN: No rashes noted  NEURO: Speech normal, normal strength and tone, normal gait and stance  PSYCH: Normal mentation, orientation to person, place, and time.  Results:  December 2001 IgG subclasses normal. IgE 11    Assessment and plan:   Ms. Barbara Salguero is a 42 year old woman without significant past history (possible history of non-specific immunodeficiency, but no specific diagnosis) who presents for evaluation of possible immunodeficiency and rash. She previously followed with immunology here and at an OSH, but has not seen anyone for years. In regards to possible history of immunodeficiency, diagnosis is unclear. Prior lab results we have from 2001 do not show IgG subclass deficiency or total IgG deficiency. Her IgE at that time as also normal. She states her sister was diagnosed with CVID and " she has some autoimmune history in the family as well (mother with lupus). It is possible a condition developed after the labs we have available were drawn, however she does not have a marked history of recent infections to support this. Furthermore, it is unclear if IgG subclass deficiency needs treatment at all (if she were to have this). We will test total IgG level at this time.   Regarding her rash and pruritis, the cause is unclear. She has many associated symptoms including chest tightness, lightheadedness, leg swelling, nausea, and rhinorrhea. There is no clear trigger for these episodes. These may be relate to a allergic/mast cell mediated reaction to a stressor (like chronic urticaria without urticaria), could be due to vasodilation (occasionally associated with pruritis), or could be unrelated events. At this time, we recommend increasing treatment with antihistamines to try to control symptoms, as she does get some benefit to antihistamines. We will have her stop Levo cetirizine, and start Allegra 1 tab BID, which could be increased to 2 tabs BID if needed. She can continue ranitidine and Benadryl if needed. She is planning on seeing immunology at the [facility] on March 8th. After this visit, she may want to follow up with our clinic, or them, depending on her preference. We will contact her with the results of today's lab testing.   Seen and discussed with Dr. Marie.     Jeremy Ashley MD  Pulmonary and Critical Care Fellow  Pager: [phone]     Physician Attestation   I, Joaquín Marie, saw this patient with the resident and agree with the resident s findings and plan of care as documented in the resident s note.        Joaquín Marie  Date of Service (when I saw the patient): Jan 29, 2018      IgG 588 which is lower than ideal.  Will recommend testing to vaccine responses.

## 2018-01-30 LAB — IGG SERPL-MCNC: 588 MG/DL (ref 695–1620)

## 2018-02-05 ENCOUNTER — HOSPITAL ENCOUNTER (OUTPATIENT)
Facility: CLINIC | Age: 43
Setting detail: SPECIMEN
Discharge: HOME | End: 2018-02-05
Admitting: ALLERGY & IMMUNOLOGY
Payer: COMMERCIAL

## 2018-02-05 DIAGNOSIS — D84.9 IMMUNODEFICIENCY (H): ICD-10-CM

## 2018-02-05 DIAGNOSIS — B99.9 RECURRENT INFECTIONS: ICD-10-CM

## 2018-02-05 LAB
BASOPHILS # BLD AUTO: 0 10E9/L (ref 0–0.2)
BASOPHILS NFR BLD AUTO: 0.2 %
DIFFERENTIAL METHOD BLD: ABNORMAL
EOSINOPHIL # BLD AUTO: 0.1 10E9/L (ref 0–0.7)
EOSINOPHIL NFR BLD AUTO: 0.7 %
ERYTHROCYTE [DISTWIDTH] IN BLOOD BY AUTOMATED COUNT: 12.7 % (ref 10–15)
HCT VFR BLD AUTO: 44.7 % (ref 35–47)
HGB BLD-MCNC: 14.4 G/DL (ref 11.7–15.7)
IMM GRANULOCYTES # BLD: 0 10E9/L (ref 0–0.4)
IMM GRANULOCYTES NFR BLD: 0.3 %
LYMPHOCYTES # BLD AUTO: 2 10E9/L (ref 0.8–5.3)
LYMPHOCYTES NFR BLD AUTO: 20.7 %
MCH RBC QN AUTO: 27 PG (ref 26.5–33)
MCHC RBC AUTO-ENTMCNC: 32.2 G/DL (ref 31.5–36.5)
MCV RBC AUTO: 84 FL (ref 78–100)
MONOCYTES # BLD AUTO: 0.7 10E9/L (ref 0–1.3)
MONOCYTES NFR BLD AUTO: 7.4 %
NEUTROPHILS # BLD AUTO: 6.8 10E9/L (ref 1.6–8.3)
NEUTROPHILS NFR BLD AUTO: 70.7 %
NRBC # BLD AUTO: 0 10*3/UL
NRBC BLD AUTO-RTO: 0 /100
PLATELET # BLD AUTO: 224 10E9/L (ref 150–450)
RBC # BLD AUTO: 5.33 10E12/L (ref 3.8–5.2)
WBC # BLD AUTO: 9.6 10E9/L (ref 4–11)

## 2018-02-05 PROCEDURE — 36415 COLL VENOUS BLD VENIPUNCTURE: CPT | Performed by: ALLERGY & IMMUNOLOGY

## 2018-02-05 PROCEDURE — 86317 IMMUNOASSAY INFECTIOUS AGENT: CPT | Mod: 91 | Performed by: ALLERGY & IMMUNOLOGY

## 2018-02-05 PROCEDURE — 86774 TETANUS ANTIBODY: CPT | Performed by: ALLERGY & IMMUNOLOGY

## 2018-02-05 PROCEDURE — 86648 DIPHTHERIA ANTIBODY: CPT | Performed by: ALLERGY & IMMUNOLOGY

## 2018-02-05 PROCEDURE — 86317 IMMUNOASSAY INFECTIOUS AGENT: CPT | Performed by: ALLERGY & IMMUNOLOGY

## 2018-02-06 LAB
IGA SERPL-MCNC: 186 MG/DL (ref 70–380)
IGM SERPL-MCNC: 115 MG/DL (ref 60–265)

## 2018-02-07 LAB
C DIPHTHERIAE IGG SER IA-ACNC: 0.49 IU/ML
C TETANI IGG SER IA-ACNC: 1.33 IU/ML
DEPRECATED S PNEUM 1 IGG SER-MCNC: 7.4 MCG/ML
DEPRECATED S PNEUM12 IGG SER-MCNC: 0.9 MCG/ML
DEPRECATED S PNEUM14 IGG SER-MCNC: 12.7 MCG/ML
DEPRECATED S PNEUM17 IGG SER-MCNC: 16 MCG/ML
DEPRECATED S PNEUM19 IGG SER-MCNC: 7.4 MCG/ML
DEPRECATED S PNEUM2 IGG SER-MCNC: 5.8 MCG/ML
DEPRECATED S PNEUM20 IGG SER-MCNC: 3.2 MCG/ML
DEPRECATED S PNEUM22 IGG SER-MCNC: 18.5 MCG/ML
DEPRECATED S PNEUM23 IGG SER-MCNC: 24.8 MCG/ML
DEPRECATED S PNEUM3 IGG SER-MCNC: 5.2 MCG/ML
DEPRECATED S PNEUM34 IGG SER-MCNC: 19.7 MCG/ML
DEPRECATED S PNEUM4 IGG SER-MCNC: 4.2 MCG/ML
DEPRECATED S PNEUM43 IGG SER-MCNC: 6.7 MCG/ML
DEPRECATED S PNEUM5 IGG SER-MCNC: 36.9 MCG/ML
DEPRECATED S PNEUM8 IGG SER-MCNC: 4.3 MCG/ML
DEPRECATED S PNEUM9 IGG SER-MCNC: 7.1 MCG/ML
HAEM INFLU B IGG SER-MCNC: 6.2 UG/ML
S PNEUM DA 15B IGG SER-MCNC: 2.9 MCG/ML
S PNEUM DA 18C IGG SER-MCNC: 15.2 MCG/ML
S PNEUM DA 19A IGG SER-MCNC: 6.8 MCG/ML
S PNEUM DA 33F IGG SER-MCNC: 7.8 MCG/ML
S PNEUM DA 6B IGG SER-MCNC: 6.1 MCG/ML
S PNEUM DA 7F IGG SER-MCNC: 12.9 MCG/ML
S PNEUM DA 9V IGG SER-MCNC: 9 MCG/ML

## 2018-03-08 ENCOUNTER — TRANSFERRED RECORDS (OUTPATIENT)
Dept: HEALTH INFORMATION MANAGEMENT | Facility: CLINIC | Age: 43
End: 2018-03-08

## 2018-03-19 ENCOUNTER — TRANSFERRED RECORDS (OUTPATIENT)
Dept: HEALTH INFORMATION MANAGEMENT | Facility: CLINIC | Age: 43
End: 2018-03-19

## 2018-05-18 ENCOUNTER — RADIANT APPOINTMENT (OUTPATIENT)
Dept: GENERAL RADIOLOGY | Facility: CLINIC | Age: 43
End: 2018-05-18
Attending: INTERNAL MEDICINE
Payer: COMMERCIAL

## 2018-05-18 ENCOUNTER — OFFICE VISIT (OUTPATIENT)
Dept: FAMILY MEDICINE | Facility: CLINIC | Age: 43
End: 2018-05-18
Payer: COMMERCIAL

## 2018-05-18 VITALS
BODY MASS INDEX: 30.54 KG/M2 | HEART RATE: 88 BPM | HEIGHT: 65 IN | DIASTOLIC BLOOD PRESSURE: 63 MMHG | TEMPERATURE: 99.9 F | OXYGEN SATURATION: 97 % | WEIGHT: 183.3 LBS | SYSTOLIC BLOOD PRESSURE: 112 MMHG

## 2018-05-18 DIAGNOSIS — M62.830 BACK MUSCLE SPASM: ICD-10-CM

## 2018-05-18 DIAGNOSIS — M54.42 ACUTE LEFT-SIDED LOW BACK PAIN WITH LEFT-SIDED SCIATICA: Primary | ICD-10-CM

## 2018-05-18 DIAGNOSIS — M54.42 ACUTE LEFT-SIDED LOW BACK PAIN WITH LEFT-SIDED SCIATICA: ICD-10-CM

## 2018-05-18 PROCEDURE — 99214 OFFICE O/P EST MOD 30 MIN: CPT | Performed by: INTERNAL MEDICINE

## 2018-05-18 PROCEDURE — 72100 X-RAY EXAM L-S SPINE 2/3 VWS: CPT

## 2018-05-18 RX ORDER — MONTELUKAST SODIUM 10 MG/1
10 TABLET ORAL AT BEDTIME
COMMUNITY

## 2018-05-18 RX ORDER — DIPHENHYDRAMINE HCL 25 MG
25 CAPSULE ORAL EVERY 6 HOURS PRN
COMMUNITY

## 2018-05-18 RX ORDER — ALBUTEROL SULFATE 90 UG/1
2 AEROSOL, METERED RESPIRATORY (INHALATION) EVERY 6 HOURS PRN
COMMUNITY

## 2018-05-18 RX ORDER — FEXOFENADINE HCL 180 MG/1
180 TABLET ORAL 2 TIMES DAILY
COMMUNITY

## 2018-05-18 RX ORDER — TRAMADOL HYDROCHLORIDE 50 MG/1
50 TABLET ORAL 2 TIMES DAILY PRN
Qty: 30 TABLET | Refills: 1 | Status: SHIPPED | OUTPATIENT
Start: 2018-05-18 | End: 2018-09-06

## 2018-05-18 RX ORDER — CYCLOBENZAPRINE HCL 5 MG
5 TABLET ORAL 2 TIMES DAILY PRN
Qty: 30 TABLET | Refills: 1 | Status: SHIPPED | OUTPATIENT
Start: 2018-05-18 | End: 2018-09-06

## 2018-05-18 RX ORDER — DOXEPIN HYDROCHLORIDE 10 MG/1
10 CAPSULE ORAL AT BEDTIME
COMMUNITY
End: 2021-12-16

## 2018-05-18 NOTE — PROGRESS NOTES
SUBJECTIVE:   May Aldana is a 43 year old female who presents to clinic today for the following health issues:      Musculoskeletal problem/pain      Duration: 3 days    Description  Location: Left low back pain    Intensity:  7/10, 9/10 with movement    Accompanying signs and symptoms: radiation of pain to Left leg    History  Previous similar problem: no   Previous evaluation:  none    Precipitating or alleviating factors:  Trauma or overuse: no   Aggravating factors include: sitting, standing, walking, climbing stairs and overuse    Therapies tried and outcome: ice and Ibuprofen    Patient denies any changes or decrease in bowel or bladder control or function at this time.     Patient does have a sister who has had lumbar disc herniation problems in the past.      Current Medications:     Current Outpatient Prescriptions   Medication Sig Dispense Refill     albuterol (PROAIR HFA/PROVENTIL HFA/VENTOLIN HFA) 108 (90 Base) MCG/ACT Inhaler Inhale 2 puffs into the lungs every 6 hours as needed for shortness of breath / dyspnea or wheezing       diphenhydrAMINE (BENADRYL) 25 MG capsule Take 25 mg by mouth every 6 hours as needed for itching or allergies       doxepin (SINEQUAN) 10 MG capsule Take 10 mg by mouth At Bedtime       fexofenadine (ALLEGRA ALLERGY) 180 MG tablet Take 180 mg by mouth 2 times daily       montelukast (SINGULAIR) 10 MG tablet Take 10 mg by mouth At Bedtime       Fish Oil-Cholecalciferol (FISH OIL + D3) 7375-1179 MG-UNIT CAPS Take 2 capsules by mouth daily       Multiple Vitamins-Minerals (MULTIVITAMIN ADULT) TABS Take 1 tablet by mouth daily       RaNITidine HCl (ZANTAC PO) Take 150 mg by mouth 2 times daily            Allergies:      Allergies   Allergen Reactions     Seasonal Allergies      Latex Rash            Past Medical History:   No past medical history on file.      Past Surgical History:     Past Surgical History:   Procedure Laterality Date     TONSILLECTOMY &  ADENOIDECTOMY  1987         Family Medical History:     Family History   Problem Relation Age of Onset     Hypertension Mother      Colon Polyps Mother      Genetic Disorder Mother      Lupus Mother      Chronic Obstructive Pulmonary Disease Mother      CLOTTING DISORDER Mother      Seasonal/Environmental Allergies Mother      Other - See Comments Mother      circulation problems, ovarian tumor, Mast Cell Activation Disorder, common variable immuno-deficiency     Seizure Disorder Mother      Migraines Mother      Substance Abuse Father      Heart Failure Maternal Grandmother      CLOTTING DISORDER Maternal Grandmother      Coronary Artery Disease Maternal Grandmother      DIABETES Maternal Grandmother      Hypertension Maternal Grandmother      Other - See Comments Maternal Grandmother      circulation problems     Other Cancer Maternal Grandfather      Leukemia Maternal Grandfather      Heart Failure Paternal Grandfather      Anesthesia Reaction Sister      Thyroid Disease Sister      Other - See Comments Sister      immune disorder,      Seasonal/Environmental Allergies Sister      CLOTTING DISORDER Sister      Migraines Sister      Seizure Disorder Sister          Social History:     Social History     Social History     Marital status:      Spouse name: N/A     Number of children: N/A     Years of education: N/A     Occupational History     Not on file.     Social History Main Topics     Smoking status: Never Smoker     Smokeless tobacco: Never Used     Alcohol use Yes      Comment: 1-2 drinks per week      Drug use: No     Sexual activity: Yes     Partners: Male     Birth control/ protection: Condom     Other Topics Concern     Parent/Sibling W/ Cabg, Mi Or Angioplasty Before 65f 55m? No     Social History Narrative           Review of System:     Constitutional: Negative for fever or chills  Skin: Negative for rashes  Ears/Nose/Throat: Negative for nasal congestion, sore throat  Respiratory: No  "shortness of breath, dyspnea on exertion, cough, or hemoptysis  Cardiovascular: Negative for chest pain  Gastrointestinal: Negative for nausea, vomiting  Genitourinary: Negative for dysuria, hematuria  Musculoskeletal: Positive for acute left sided low back pains with left sided sciatica and muscle spasms  Neurologic: Negative for headaches  Psychiatric: Negative for depression, anxiety  Hematologic/Lymphatic/Immunologic: Negative  Endocrine: Negative  Behavioral: Negative for tobacco use       Physical Exam:   /63 (BP Location: Left arm, Cuff Size: Adult Large)  Pulse 88  Temp 99.9  F (37.7  C) (Oral)  Ht 5' 5.25\" (1.657 m)  Wt 183 lb 4.8 oz (83.1 kg)  LMP 04/29/2018 (Exact Date)  SpO2 97%  BMI 30.27 kg/m2    GENERAL: alert and no distress  EYES: eyes grossly normal to inspection, and conjunctivae and sclerae normal  HENT: Normocephalic atraumatic. Nose and mouth without ulcers or lesions  NECK: supple  RESP: lungs clear to auscultation   CV: regular rate and rhythm, normal S1 S2  LYMPH: no peripheral edema   ABDOMEN: nondistended  MS: left sided low back pains with left sided sciatica symptoms and muscle spasms present  SKIN: no suspicious lesions or rashes  NEURO: Alert & Oriented x 3.   PSYCH: mentation appears normal, affect normal        Diagnostic Test Results:   Xray of the lumbar spine is ordered for further evaluation of the patient's current low back pains and is pending at this time.    ASSESSMENT/PLAN:       (M54.42) Acute left-sided low back pain with left-sided sciatica  (primary encounter diagnosis)  (M62.830) Back muscle spasm  Comment: acute left sided low back pains with back muscle spasms. Patient denies any changes or decrease in bowel or bladder control or function at this time. Patient does have a sister who has had lumbar disc herniation problems in the past.  Plan: I have ordered ORTHO  REFERRAL, XR Lumbar Spine 2/3 Views for further evlauation and managmeent going " forward. In the meantime, I have ordered cyclobenzaprine (FLEXERIL) 5 MG tablettraMADol (ULTRAM) 50 MG tablet for pain relief.      Follow Up Plan:     Patient is instructed to follow-up with the [facility] in [place] next Wednesday for further evalutoin..        Edna Vasquez MD  Internal Medicine  [facility]

## 2018-05-18 NOTE — MR AVS SNAPSHOT
After Visit Summary   5/18/2018    May Aldana    MRN: [id number]           Patient Information     Date Of Birth          1975        Visit Information        Provider Department      5/18/2018 4:00 PM Edna Vasquez MD [facility]        Today's Diagnoses     Acute left-sided low back pain with left-sided sciatica    -  1    Back muscle spasm           Follow-ups after your visit        Additional Services     ORTHO  REFERRAL       [facility] is referring you to the Orthopedic  Services at [facility].       The  Representative will assist you in the coordination of your Orthopedic and Musculoskeletal Care as prescribed by your physician.    The  Representative will call you within 1 business day to help schedule your appointment, or you may contact the  Representative at:    All areas ~ [phone]     Type of Referral : Spine: Lumbar  **Choose Medical Spine Specialist (unless patient was seen by a Medical Spine Specialist within the past 6 months).**  Surgical Evaluation is advised if the patient presents with one or more of the following red flags: Evidence of Spinal Tumor, Infection or Fracture, Cauda Equina Syndrome, Sudden or Progressive Weakness, Loss of Bowel or Bladder Control, or any other documented emergent neurological condition resulting from a Lumbar Spinal Condition. Spine Surgeon        Timeframe requested: 3 - 5 days    Coverage of these services is subject to the terms and limitations of your health insurance plan.  Please call member services at your health plan with any benefit or coverage questions.      If X-rays, CT or MRI's have been performed, please contact the facility where they were done to arrange for , prior to your scheduled appointment.  Please bring this referral request to your appointment and present it to your specialist.                 "  Your next 10 appointments already scheduled     May 23, 2018 10:50 AM CDT   New Visit with Jordi Mejia MD   [facility] ([facility] [place] [facility])    [address]  [facility] [place] [address] [phone]              Who to contact     If you have questions or need follow up information about today's clinic visit or your schedule please contact [facility] directly at [phone].  Normal or non-critical lab and imaging results will be communicated to you by DivvyCloudhart, letter or phone within 4 business days after the clinic has received the results. If you do not hear from us within 7 days, please contact the clinic through DB[employer] Mobile[employer] or phone. If you have a critical or abnormal lab result, we will notify you by phone as soon as possible.  Submit refill requests through [employer] or call your pharmacy and they will forward the refill request to us. Please allow 3 business days for your refill to be completed.          Additional Information About Your Visit        DivvyCloudharAssertID Information     [employer] gives you secure access to your electronic health record. If you see a primary care provider, you can also send messages to your care team and make appointments. If you have questions, please call your primary care clinic.  If you do not have a primary care provider, please call [phone] and they will assist you.        Care EveryWhere ID     This is your Care EveryWhere ID. This could be used by other organizations to access your [facility] medical records  [id number]        Your Vitals Were     Pulse Temperature Height Last Period Pulse Oximetry BMI (Body Mass Index)    88 99.9  F (37.7  C) (Oral) 5' 5.25\" (1.657 m) 04/29/2018 (Exact Date) 97% 30.27 kg/m2       Blood Pressure from Last 3 Encounters:   05/18/18 112/63   01/29/18 137/71   01/19/18 120/49    Weight from Last 3 Encounters:   05/18/18 183 lb 4.8 oz (83.1 kg)   01/29/18 177 lb (80.3 kg) "   01/19/18 177 lb (80.3 kg)              We Performed the Following     ORTHO  REFERRAL          Today's Medication Changes          These changes are accurate as of 5/18/18  4:51 PM.  If you have any questions, ask your nurse or doctor.               Start taking these medicines.        Dose/Directions    cyclobenzaprine 5 MG tablet   Commonly known as:  FLEXERIL   Used for:  Acute left-sided low back pain with left-sided sciatica, Back muscle spasm   Started by:  Edna Vasquez MD        Dose:  5 mg   Take 1 tablet (5 mg) by mouth 2 times daily as needed for muscle spasms   Quantity:  30 tablet   Refills:  1       traMADol 50 MG tablet   Commonly known as:  ULTRAM   Used for:  Back muscle spasm, Acute left-sided low back pain with left-sided sciatica   Started by:  Edna Vasquez MD        Dose:  50 mg   Take 1 tablet (50 mg) by mouth 2 times daily as needed for severe pain   Quantity:  30 tablet   Refills:  1            Where to get your medicines      Some of these will need a paper prescription and others can be bought over the counter.  Ask your nurse if you have questions.     Bring a paper prescription for each of these medications     cyclobenzaprine 5 MG tablet    traMADol 50 MG tablet               Information about OPIOIDS     PRESCRIPTION OPIOIDS: WHAT YOU NEED TO KNOW   You have a prescription for an opioid (narcotic) pain medicine. Opioids can cause addiction. If you have a history of chemical dependency of any type, you are at a higher risk of becoming addicted to opioids. Only take this medicine after all other options have been tried. Take it for as short a time and as few doses as possible.     Do not:    Drive. If you drive while taking these medicines, you could be arrested for driving under the influence (DUI).    Operate heavy machinery    Do any other dangerous activities while taking these medicines.     Drink any alcohol while taking these medicines.      Take with any  other medicines that contain acetaminophen. Read all labels carefully. Look for the word  acetaminophen  or  Tylenol.  Ask your pharmacist if you have questions or are unsure.    Store your pills in a secure place, locked if possible. We will not replace any lost or stolen medicine. If you don t finish your medicine, please throw away (dispose) as directed by your pharmacist. The [place] Pollution Control Agency has more information about safe disposal: https://www.pca.[facility].[place]/living-green/managing-unwanted-medications    All opioids tend to cause constipation. Drink plenty of water and eat foods that have a lot of fiber, such as fruits, vegetables, prune juice, apple juice and high-fiber cereal. Take a laxative (Miralax, milk of magnesia, Colace, Senna) if you don t move your bowels at least every other day.          Primary Care Provider Office Phone # Fax #    Edna Elie Vasquez [phone] [address] [address]        Equal Access to Services     [facility] : Hadii everardo ku hadasho Soomaali, waaxda luqadaha, qaybta kaalmada adeegyada, waxay nahum jaimes . So [facility] [phone].    ATENCIÓN: Si habla español, tiene a hanna disposición servicios gratuitos de asistencia lingüística. Llame al [phone].    We comply with applicable federal civil rights laws and [place] laws. We do not discriminate on the basis of race, color, national origin, age, disability, sex, sexual orientation, or gender identity.            Thank you!     Thank you for choosing [facility]  for your care. Our goal is always to provide you with excellent care. Hearing back from our patients is one way we can continue to improve our services. Please take a few minutes to complete the written survey that you may receive in the mail after your visit with us. Thank you!             Your Updated Medication List - Protect others around you: Learn how to safely use,  store and throw away your medicines at www.disposemymeds.org.          This list is accurate as of 5/18/18  4:51 PM.  Always use your most recent med list.                   Brand Name Dispense Instructions for use Diagnosis    albuterol 108 (90 Base) MCG/ACT Inhaler    PROAIR HFA/PROVENTIL HFA/VENTOLIN HFA     Inhale 2 puffs into the lungs every 6 hours as needed for shortness of breath / dyspnea or wheezing        ALLEGRA ALLERGY 180 MG tablet   Generic drug:  fexofenadine      Take 180 mg by mouth 2 times daily        BENADRYL 25 MG capsule   Generic drug:  diphenhydrAMINE      Take 25 mg by mouth every 6 hours as needed for itching or allergies        cyclobenzaprine 5 MG tablet    FLEXERIL    30 tablet    Take 1 tablet (5 mg) by mouth 2 times daily as needed for muscle spasms    Acute left-sided low back pain with left-sided sciatica, Back muscle spasm       doxepin 10 MG capsule    SINEquan     Take 10 mg by mouth At Bedtime        FISH OIL + D3 3439-5683 MG-UNIT Caps      Take 2 capsules by mouth daily        MULTIVITAMIN ADULT Tabs      Take 1 tablet by mouth daily        SINGULAIR 10 MG tablet   Generic drug:  montelukast      Take 10 mg by mouth At Bedtime        traMADol 50 MG tablet    ULTRAM    30 tablet    Take 1 tablet (50 mg) by mouth 2 times daily as needed for severe pain    Back muscle spasm, Acute left-sided low back pain with left-sided sciatica       ZANTAC PO      Take 150 mg by mouth 2 times daily

## 2018-05-23 ENCOUNTER — OFFICE VISIT (OUTPATIENT)
Dept: NEUROSURGERY | Facility: CLINIC | Age: 43
End: 2018-05-23
Attending: NEUROLOGICAL SURGERY
Payer: COMMERCIAL

## 2018-05-23 VITALS
HEART RATE: 86 BPM | HEIGHT: 65 IN | DIASTOLIC BLOOD PRESSURE: 70 MMHG | BODY MASS INDEX: 30.49 KG/M2 | SYSTOLIC BLOOD PRESSURE: 116 MMHG | WEIGHT: 183 LBS | OXYGEN SATURATION: 98 %

## 2018-05-23 DIAGNOSIS — M54.16 LUMBAR RADICULAR PAIN: Primary | ICD-10-CM

## 2018-05-23 PROCEDURE — G0463 HOSPITAL OUTPT CLINIC VISIT: HCPCS | Performed by: NURSE PRACTITIONER

## 2018-05-23 PROCEDURE — 99244 OFF/OP CNSLTJ NEW/EST MOD 40: CPT | Performed by: NURSE PRACTITIONER

## 2018-05-23 RX ORDER — METHYLPREDNISOLONE 4 MG
TABLET, DOSE PACK ORAL
Qty: 21 TABLET | Refills: 0 | Status: SHIPPED | OUTPATIENT
Start: 2018-05-23 | End: 2018-09-06

## 2018-05-23 ASSESSMENT — PAIN SCALES - GENERAL: PAINLEVEL: MODERATE PAIN (4)

## 2018-05-23 NOTE — LETTER
5/23/2018         RE: May Aldana  [address]  [place] [facility] [address]        Dear Colleague,    Thank you for referring your patient, May Aldana, to the [facility]. Please see a copy of my visit note below.    Dr. Jordi Mejia  [facility]  Neurosurgery Clinic Visit        CC: low back and left leg pain     Primary care Provider: Edna Vasquez      Reason For Visit:   I was asked by Dr. Vasquez to consult on the patient for lumbar radicular pain on the left.      HPI: May Aldana is a 43 year old female with lumbar radicular pain on the left. She notes that 1 week ago she stood up from the couch and had severe pain and could not walk. She states that her  had to carry her to bed. She did go to work yesterday but only last 1/2 day and came home. She was given a muscle relaxant and Ultram from PCP but did not help.  She notes back pain today with previous radicular pain down the  Back of her leg to her knee. She is slightly better but still has the pain. She denies any foot drop or drag.  She has not had Pt or injections for her pain.      Pain at its worst 10  Pain right now:  4    Past Medical History:   Diagnosis Date     Low back pain        Past Medical History reviewed with patient during visit.    Past Surgical History:   Procedure Laterality Date     TONSILLECTOMY & ADENOIDECTOMY  1987     Past Surgical History reviewed with patient during visit.    Current Outpatient Prescriptions   Medication     albuterol (PROAIR HFA/PROVENTIL HFA/VENTOLIN HFA) 108 (90 Base) MCG/ACT Inhaler     cyclobenzaprine (FLEXERIL) 5 MG tablet     diphenhydrAMINE (BENADRYL) 25 MG capsule     doxepin (SINEQUAN) 10 MG capsule     fexofenadine (ALLEGRA ALLERGY) 180 MG tablet     Fish Oil-Cholecalciferol (FISH OIL + D3) 3156-6057 MG-UNIT CAPS     montelukast (SINGULAIR) 10 MG tablet     Multiple Vitamins-Minerals (MULTIVITAMIN ADULT) TABS      RaNITidine HCl (ZANTAC PO)     traMADol (ULTRAM) 50 MG tablet     No current facility-administered medications for this visit.        Allergies   Allergen Reactions     Seasonal Allergies      Latex Rash       Social History     Social History     Marital status:      Spouse name: N/A     Number of children: N/A     Years of education: N/A     Social History Main Topics     Smoking status: Never Smoker     Smokeless tobacco: Never Used     Alcohol use Yes      Comment: 1-2 drinks per week      Drug use: No     Sexual activity: Yes     Partners: Male     Birth control/ protection: Condom     Other Topics Concern     Parent/Sibling W/ Cabg, Mi Or Angioplasty Before 65f 55m? No     Social History Narrative       Family History   Problem Relation Age of Onset     Hypertension Mother      Colon Polyps Mother      Genetic Disorder Mother      Lupus Mother      Chronic Obstructive Pulmonary Disease Mother      CLOTTING DISORDER Mother      Seasonal/Environmental Allergies Mother      Other - See Comments Mother      circulation problems, ovarian tumor, Mast Cell Activation Disorder, common variable immuno-deficiency     Seizure Disorder Mother      Migraines Mother      Substance Abuse Father      Heart Failure Maternal Grandmother      CLOTTING DISORDER Maternal Grandmother      Coronary Artery Disease Maternal Grandmother      DIABETES Maternal Grandmother      Hypertension Maternal Grandmother      Other - See Comments Maternal Grandmother      circulation problems     Other Cancer Maternal Grandfather      Leukemia Maternal Grandfather      Heart Failure Paternal Grandfather      Anesthesia Reaction Sister      Thyroid Disease Sister      Other - See Comments Sister      immune disorder,      Seasonal/Environmental Allergies Sister      CLOTTING DISORDER Sister      Migraines Sister      Seizure Disorder Sister      Low Back Problems Sister          Review Of Systems  Skin: negative  Eyes:  "negative  Ears/Nose/Throat: negative  Respiratory: No shortness of breath, dyspnea on exertion, cough, or hemoptysis  Cardiovascular: negative  Gastrointestinal: negative  Genitourinary: negative  Musculoskeletal: back pain  Neurologic: left leg pain   Psychiatric: negative  Hematologic/Lymphatic/Immunologic: negative  Endocrine: negative     ROS: 10 point ROS neg other than the symptoms noted above in the HPI.      Vital Signs: /70 (BP Location: Right arm, Patient Position: Sitting, Cuff Size: Adult Regular)  Pulse 86  Ht 5' 5.25\" (1.657 m)  Wt 183 lb (83 kg)  LMP 04/29/2018 (Exact Date)  SpO2 98%  BMI 30.22 kg/m2    Examination:  Constitutional:  Alert, well nourished, NAD.  Memory: recent and remote memory intact  HEENT: Normocephalic, atraumatic.   Pulm:  Without shortness of breath   CV:  No pitting edema of BLE.    Neurological:  Awake  Alert  Oriented x 3  Speech clear  Cranial nerves II - XII intact  PERRL  EOMI  Face symmetric  Tongue midline  Motor exam   Shoulder Abduction:  Right:  5/5   Left:  5/5  Biceps:                      Right:  5/5   Left:  5/5  Triceps:                     Right:  5/5   Left:  5/5  Wrist Extensors:       Right:  5/5   Left:  5/5  Wrist Flexors:           Right:  5/5   Left:  5/5  Intrinsics:                   Right:  5/5   Left:  5/5   Hip Flexor:                Right: 5/5  Left:  5/5  Hip Adductor:             Right:  5/5  Left:  5/5  Hip Abductor:             Right:  5/5  Left:  5/5  Gastroc Soleus:        Right:  5/5  Left:  5/5  Tib/Ant:                      Right:  5/5  Left:  5/5  EHL:                          Right:  5/5  Left:  5/5   Sensation normal to bilateral upper and lower extremities  Muscle tone to bilateral upper and lower extremities normal   Gait: Able to stand from a seated position. Normal non-antalgic, non-myelopathic gait.  Able to heel/toe walk without loss of balance      Lumbar examination reveals no tenderness of the spine or paraspinous " muscles.  Hip height is symmetrical. Negative SI joint, sciatic notch or greater trochanteric tenderness to palpation bilaterally.  Straight leg raise is negative bilaterally.      Imaging:     IMPRESSION: Lumbar vertebrae are normally aligned. No compression  deformity or acute fracture.       Assessment/Plan:   May Aldana is a 43 year old female with lumbar radicular pain on the left. She notes that 1 week ago she stood up from the couch and had severe pain and could not walk. She states that her  had to carry her to bed. She did go to work yesterday but only last 1/2 day and came home. She was given a muscle relaxant and Ultram from PCP but did not help.  She notes back pain today with previous radicular pain down the  Back of her leg to her knee. She is slightly better but still has the pain. She denies any foot drop or drag.  She has not had Pt or injections for her pain.  The pt overall is feeling better and is neurologically intact.  At this time it was recommended she trial a Medrol dose pack and PT. If pain returns or persists I would then have her call for a MRI order.  She agreed to the POC.     Patient Instructions   1.  Medrol dose pack for pain and inflammation.    2. Please schedule your physical therapy.      3. Please contact the clinic if pain persists at [phone]. Then we will order a lumbar MRI.                Deanna Hanks CNP  [facility]  [address] [address]    Tel [phone]  Pager [phone]      Again, thank you for allowing me to participate in the care of your patient.        Sincerely,        CINDY Hussein CNP

## 2018-05-23 NOTE — PROGRESS NOTES
Dr. Jordi Mejia  [facility]  Neurosurgery Clinic Visit        CC: low back and left leg pain     Primary care Provider: Edna Vasquez      Reason For Visit:   I was asked by Dr. Vasquez to consult on the patient for lumbar radicular pain on the left.      HPI: May Aldana is a 43 year old female with lumbar radicular pain on the left. She notes that 1 week ago she stood up from the couch and had severe pain and could not walk. She states that her  had to carry her to bed. She did go to work yesterday but only last 1/2 day and came home. She was given a muscle relaxant and Ultram from PCP but did not help.  She notes back pain today with previous radicular pain down the  Back of her leg to her knee. She is slightly better but still has the pain. She denies any foot drop or drag.  She has not had Pt or injections for her pain.      Pain at its worst 10  Pain right now:  4    Past Medical History:   Diagnosis Date     Low back pain        Past Medical History reviewed with patient during visit.    Past Surgical History:   Procedure Laterality Date     TONSILLECTOMY & ADENOIDECTOMY  1987     Past Surgical History reviewed with patient during visit.    Current Outpatient Prescriptions   Medication     albuterol (PROAIR HFA/PROVENTIL HFA/VENTOLIN HFA) 108 (90 Base) MCG/ACT Inhaler     cyclobenzaprine (FLEXERIL) 5 MG tablet     diphenhydrAMINE (BENADRYL) 25 MG capsule     doxepin (SINEQUAN) 10 MG capsule     fexofenadine (ALLEGRA ALLERGY) 180 MG tablet     Fish Oil-Cholecalciferol (FISH OIL + D3) 9084-4924 MG-UNIT CAPS     montelukast (SINGULAIR) 10 MG tablet     Multiple Vitamins-Minerals (MULTIVITAMIN ADULT) TABS     RaNITidine HCl (ZANTAC PO)     traMADol (ULTRAM) 50 MG tablet     No current facility-administered medications for this visit.        Allergies   Allergen Reactions     Seasonal Allergies      Latex Rash       Social History     Social History     Marital status:       Spouse name: N/A     Number of children: N/A     Years of education: N/A     Social History Main Topics     Smoking status: Never Smoker     Smokeless tobacco: Never Used     Alcohol use Yes      Comment: 1-2 drinks per week      Drug use: No     Sexual activity: Yes     Partners: Male     Birth control/ protection: Condom     Other Topics Concern     Parent/Sibling W/ Cabg, Mi Or Angioplasty Before 65f 55m? No     Social History Narrative       Family History   Problem Relation Age of Onset     Hypertension Mother      Colon Polyps Mother      Genetic Disorder Mother      Lupus Mother      Chronic Obstructive Pulmonary Disease Mother      CLOTTING DISORDER Mother      Seasonal/Environmental Allergies Mother      Other - See Comments Mother      circulation problems, ovarian tumor, Mast Cell Activation Disorder, common variable immuno-deficiency     Seizure Disorder Mother      Migraines Mother      Substance Abuse Father      Heart Failure Maternal Grandmother      CLOTTING DISORDER Maternal Grandmother      Coronary Artery Disease Maternal Grandmother      DIABETES Maternal Grandmother      Hypertension Maternal Grandmother      Other - See Comments Maternal Grandmother      circulation problems     Other Cancer Maternal Grandfather      Leukemia Maternal Grandfather      Heart Failure Paternal Grandfather      Anesthesia Reaction Sister      Thyroid Disease Sister      Other - See Comments Sister      immune disorder,      Seasonal/Environmental Allergies Sister      CLOTTING DISORDER Sister      Migraines Sister      Seizure Disorder Sister      Low Back Problems Sister          Review Of Systems  Skin: negative  Eyes: negative  Ears/Nose/Throat: negative  Respiratory: No shortness of breath, dyspnea on exertion, cough, or hemoptysis  Cardiovascular: negative  Gastrointestinal: negative  Genitourinary: negative  Musculoskeletal: back pain  Neurologic: left leg pain   Psychiatric:  "negative  Hematologic/Lymphatic/Immunologic: negative  Endocrine: negative     ROS: 10 point ROS neg other than the symptoms noted above in the HPI.      Vital Signs: /70 (BP Location: Right arm, Patient Position: Sitting, Cuff Size: Adult Regular)  Pulse 86  Ht 5' 5.25\" (1.657 m)  Wt 183 lb (83 kg)  LMP 04/29/2018 (Exact Date)  SpO2 98%  BMI 30.22 kg/m2    Examination:  Constitutional:  Alert, well nourished, NAD.  Memory: recent and remote memory intact  HEENT: Normocephalic, atraumatic.   Pulm:  Without shortness of breath   CV:  No pitting edema of BLE.    Neurological:  Awake  Alert  Oriented x 3  Speech clear  Cranial nerves II - XII intact  PERRL  EOMI  Face symmetric  Tongue midline  Motor exam   Shoulder Abduction:  Right:  5/5   Left:  5/5  Biceps:                      Right:  5/5   Left:  5/5  Triceps:                     Right:  5/5   Left:  5/5  Wrist Extensors:       Right:  5/5   Left:  5/5  Wrist Flexors:           Right:  5/5   Left:  5/5  Intrinsics:                   Right:  5/5   Left:  5/5   Hip Flexor:                Right: 5/5  Left:  5/5  Hip Adductor:             Right:  5/5  Left:  5/5  Hip Abductor:             Right:  5/5  Left:  5/5  Gastroc Soleus:        Right:  5/5  Left:  5/5  Tib/Ant:                      Right:  5/5  Left:  5/5  EHL:                          Right:  5/5  Left:  5/5   Sensation normal to bilateral upper and lower extremities  Muscle tone to bilateral upper and lower extremities normal   Gait: Able to stand from a seated position. Normal non-antalgic, non-myelopathic gait.  Able to heel/toe walk without loss of balance      Lumbar examination reveals no tenderness of the spine or paraspinous muscles.  Hip height is symmetrical. Negative SI joint, sciatic notch or greater trochanteric tenderness to palpation bilaterally.  Straight leg raise is negative bilaterally.      Imaging:     IMPRESSION: Lumbar vertebrae are normally aligned. No " compression  deformity or acute fracture.       Assessment/Plan:   May Aldana is a 43 year old female with lumbar radicular pain on the left. She notes that 1 week ago she stood up from the couch and had severe pain and could not walk. She states that her  had to carry her to bed. She did go to work yesterday but only last 1/2 day and came home. She was given a muscle relaxant and Ultram from PCP but did not help.  She notes back pain today with previous radicular pain down the  Back of her leg to her knee. She is slightly better but still has the pain. She denies any foot drop or drag.  She has not had Pt or injections for her pain.  The pt overall is feeling better and is neurologically intact.  At this time it was recommended she trial a Medrol dose pack and PT. If pain returns or persists I would then have her call for a MRI order.  She agreed to the POC.     Patient Instructions   1.  Medrol dose pack for pain and inflammation.    2. Please schedule your physical therapy.      3. Please contact the clinic if pain persists at [phone]. Then we will order a lumbar MRI.                Deanna Hanks [facility]  [address] [address]    Tel [phone]  Pager [phone]

## 2018-05-23 NOTE — MR AVS SNAPSHOT
After Visit Summary   5/23/2018    May Aldana    MRN: [id number]           Patient Information     Date Of Birth          1975        Visit Information        Provider Department      5/23/2018 10:50 AM Deanna Hanks APRN CNP [facility]        Today's Diagnoses     Lumbar radicular pain    -  1      Care Instructions    1.  Medrol dose pack for pain and inflammation.    2. Please schedule your physical therapy.      3. Please contact the clinic if pain persists at [phone]. Then we will order a lumbar MRI.           Follow-ups after your visit        Additional Services     [facility] PT, HAND, AND CHIROPRACTIC REFERRAL       **This order will print in the [facility] Scheduling Office**    Physical Therapy, Hand Therapy and Chiropractic Care are available through:    *[facility]  *[facility]  *[facility]    Call one number to schedule at any of the above locations: [phone].    Your provider has referred you to: Physical Therapy at [facility] or [facility]    Indication/Reason for Referral: low back and left leg pain   Onset of Illness: chronic   Therapy Orders: Evaluate and Treat  Special Programs: None  Special Request: None    Briana Warren      Additional Comments for the Therapist or Chiropractor:       Please be aware that coverage of these services is subject to the terms and limitations of your health insurance plan.  Call member services at your health plan with any benefit or coverage questions.      Please bring the following to your appointment:    *Your personal calendar for scheduling future appointments  *Comfortable clothing                  Your next 10 appointments already scheduled     May 23, 2018 10:50 AM CDT   New Visit with CINDY Hussein CNP   [facility] ([facility])    [address] [address] [id number] [phone]  "             Who to contact     If you have questions or need follow up information about today's clinic visit or your schedule please contact [facility] directly at [phone].  Normal or non-critical lab and imaging results will be communicated to you by MyChart, letter or phone within 4 business days after the clinic has received the results. If you do not hear from us within 7 days, please contact the clinic through [employer] or phone. If you have a critical or abnormal lab result, we will notify you by phone as soon as possible.  Submit refill requests through "[employer] or call your pharmacy and they will forward the refill request to us. Please allow 3 business days for your refill to be completed.          Additional Information About Your Visit        "[employer] Information     "[employer] gives you secure access to your electronic health record. If you see a primary care provider, you can also send messages to your care team and make appointments. If you have questions, please call your primary care clinic.  If you do not have a primary care provider, please call [phone] and they will assist you.        Care EveryWhere ID     This is your Care EveryWhere ID. This could be used by other organizations to access your [facility] medical records  [id number]        Your Vitals Were     Pulse Height Last Period Pulse Oximetry BMI (Body Mass Index)       86 5' 5.25\" (1.657 m) 04/29/2018 (Exact Date) 98% 30.22 kg/m2        Blood Pressure from Last 3 Encounters:   05/23/18 116/70   05/18/18 112/63   01/29/18 137/71    Weight from Last 3 Encounters:   05/23/18 183 lb (83 kg)   05/18/18 183 lb 4.8 oz (83.1 kg)   01/29/18 177 lb (80.3 kg)              We Performed the Following     [facility] PT, HAND, AND CHIROPRACTIC REFERRAL          Today's Medication Changes          These changes are accurate as of 5/23/18 10:45 AM.  If you have any questions, ask your nurse or doctor.               Start taking these " medicines.        Dose/Directions    methylPREDNISolone 4 MG tablet   Commonly known as:  MEDROL DOSEPAK   Used for:  Lumbar radicular pain   Started by:  Deanna Hanks APRN CNP        Follow package instructions   Quantity:  21 tablet   Refills:  0            Where to get your medicines      These medications were sent to [facility] [id number] [place] [address] AT [address] [address]     Phone:  [phone]     methylPREDNISolone 4 MG tablet                Primary Care Provider Office Phone # Fax #    Edna Elie Vasquez [phone] [address] [facility] [address]  [place] [address]        Equal Access to Services     TWILA TOVAR : Hadii everardo Whalen, walito cruz, qaybta kaalmada bob, bianca jaimes . So [facility] [phone].    ATENCIÓN: Si habla español, tiene a hanna disposición servicios gratuitos de asistencia lingüística. [facility] [phone].    We comply with applicable federal civil rights laws and [place] laws. We do not discriminate on the basis of race, color, national origin, age, disability, sex, sexual orientation, or gender identity.            Thank you!     Thank you for choosing [facility]  for your care. Our goal is always to provide you with excellent care. Hearing back from our patients is one way we can continue to improve our services. Please take a few minutes to complete the written survey that you may receive in the mail after your visit with us. Thank you!             Your Updated Medication List - Protect others around you: Learn how to safely use, store and throw away your medicines at www.disposemymeds.org.          This list is accurate as of 5/23/18 10:45 AM.  Always use your most recent med list.                   Brand Name Dispense Instructions for use Diagnosis    albuterol 108 (90 Base) MCG/ACT Inhaler    PROAIR  HFA/PROVENTIL HFA/VENTOLIN HFA     Inhale 2 puffs into the lungs every 6 hours as needed for shortness of breath / dyspnea or wheezing        ALLEGRA ALLERGY 180 MG tablet   Generic drug:  fexofenadine      Take 180 mg by mouth 2 times daily        BENADRYL 25 MG capsule   Generic drug:  diphenhydrAMINE      Take 25 mg by mouth every 6 hours as needed for itching or allergies        cyclobenzaprine 5 MG tablet    FLEXERIL    30 tablet    Take 1 tablet (5 mg) by mouth 2 times daily as needed for muscle spasms    Acute left-sided low back pain with left-sided sciatica, Back muscle spasm       doxepin 10 MG capsule    SINEquan     Take 10 mg by mouth At Bedtime        FISH OIL + D3 2983-2588 MG-UNIT Caps      Take 2 capsules by mouth daily        methylPREDNISolone 4 MG tablet    MEDROL DOSEPAK    21 tablet    Follow package instructions    Lumbar radicular pain       MULTIVITAMIN ADULT Tabs      Take 1 tablet by mouth daily        SINGULAIR 10 MG tablet   Generic drug:  montelukast      Take 10 mg by mouth At Bedtime        traMADol 50 MG tablet    ULTRAM    30 tablet    Take 1 tablet (50 mg) by mouth 2 times daily as needed for severe pain    Back muscle spasm, Acute left-sided low back pain with left-sided sciatica       ZANTAC PO      Take 150 mg by mouth 2 times daily

## 2018-05-23 NOTE — PATIENT INSTRUCTIONS
1.  Medrol dose pack for pain and inflammation.    2. Please schedule your physical therapy.      3. Please contact the clinic if pain persists at [phone]. Then we will order a lumbar MRI.

## 2018-05-23 NOTE — NURSING NOTE
"May Aldana is a 43 year old female who presents for:  Chief Complaint   Patient presents with     Neurologic Problem        Vitals:    Vitals:    05/23/18 1026   BP: 116/70   BP Location: Right arm   Patient Position: Sitting   Cuff Size: Adult Regular   Pulse: 86   SpO2: 98%   Weight: 183 lb (83 kg)   Height: 5' 5.25\" (1.657 m)       BMI:  Estimated body mass index is 30.22 kg/(m^2) as calculated from the following:    Height as of this encounter: 5' 5.25\" (1.657 m).    Weight as of this encounter: 183 lb (83 kg).    Pain Score:  Moderate Pain (4)      Do you feel safe in your environment?  Yes      Devorah Garcia          "

## 2018-05-31 ENCOUNTER — THERAPY VISIT (OUTPATIENT)
Dept: PHYSICAL THERAPY | Facility: CLINIC | Age: 43
End: 2018-05-31
Payer: COMMERCIAL

## 2018-05-31 DIAGNOSIS — M54.42 ACUTE LEFT-SIDED LOW BACK PAIN WITH LEFT-SIDED SCIATICA: Primary | ICD-10-CM

## 2018-05-31 PROCEDURE — 97110 THERAPEUTIC EXERCISES: CPT | Mod: GP | Performed by: PHYSICAL THERAPIST

## 2018-05-31 PROCEDURE — 97161 PT EVAL LOW COMPLEX 20 MIN: CPT | Mod: GP | Performed by: PHYSICAL THERAPIST

## 2018-05-31 NOTE — PROGRESS NOTES
[facility] Initial Evaluation  Subjective:  May Aldana is a 43 year old female.    Patient s chief complaints: L LBP with pain to posterior L knee.    Condition occurred due to stood up from couch and noted intense pain.  Date of Onset: 5/16/18  Location of symptoms is L LB/SI J area, posterior L thigh to knee .  Symptoms other than pain include: stiffness, tight muscles   Quality of pain is aching/stiff now, sharp and shooting previously and frequency is constant.    Pain dependence on time of day is: worse in pm  Pain rating is: 1-2/10 last few days; up to 9/10 when stood up from couch. .    Symptoms are exacerbated by: lifting, prolonged sitting/driving, gets stiff if stands to long.    Symptoms are relieved by:  Laying flat on bed, supine.    Progression of symptoms is that symptoms are:  Overall notably better.  Imaging/Special tests include: x-ray, negative   Previous treatments include: none.   Patient reports that general health is: good.   Pertinent medical history includes:  none.    Medical allergies includes: latex.   Surgical history includes: tonsils and adnoids.  Current medications include: antihistamine, ibuprofen  Current occupation is:   Work status is: working normal job  Primary job tasks include: sitting, computer work  Barriers include: none  Red flags include: none    Patient's expectations for therapy include: nothing specific.    HPI                    Objective:  LUMBAR:    Posture: fair  Posture Correction: better  Relevant Lateral Shift: none    Neurological:    Motor Deficit:  Myotomes L R   L1-2 (hip flexion) 5 5   L3 (knee extension) 5 5   L4 (ankle DF) 5 5   L5 (g. toe ext) 5 5   S1 (ankle PF or knee flex) 5 5     Sensory Deficit, Reflexes: light touch screen negative B LE dermatomes    Dural Signs:   L R   Slump Tight posterior thigh negative   SLR Tight posterior thigh negative       AROM: (Major, Moderate, Minimal or Nil  loss)  Movement Loss Levon Mod Min Nil Pain   Flexion    X Fingers to floor without pain   Extension    X No effect   Side Gliding L    X No effect   Side Gliding R    X No effect     Repeated movement testing:   (During: produces, abolishes, increases, decreases, no effect, centralizing, peripheralizing; After: better, worse, no better, no worse, no effect, centralized, peripheralized)    Pre-test Symptoms Standing: no symptoms   Symptoms During Symptoms After ROM increased ROM decreased No Effect   FIS        Rep FIS Produced tightness in LB and in post L thigh to knee worse   X   EIS        Rep EIS Decreased abolished   X   Pre-test Symptoms Lying: none    Symptoms During Symptoms After ROM increased ROM decreased No Effect   KIYA        Rep KIYA Produced tightness LB worse   X   EIL        Rep EIL decreased abolished        Static Tests: prone lying no symptoms; TIESHA no symptoms  Other Tests: palpation tenderness L3,4 spinous processes, lower lumbar paraspinals, sciatic notch  Some central LBP with press up with exhale/sag, may start without exhale/sag and move to that as improves    Provisional Classification: derangement  Principle of Management: will initiate extension in standing or lying.  Will work on posture/ergonomics.  Will work on core strengthening as well.     System    Physical Exam    General     ROS    Assessment/Plan:    Patient is a 43 year old female with lumbar complaints.    Patient has the following significant findings with corresponding treatment plan.                Diagnosis 1:  LBP with L sided radicular symptoms posterior thigh to knee    Pain -  hot/cold therapy, manual therapy and directional preference exercise  Decreased ROM/flexibility - manual therapy and therapeutic exercise  Decreased strength - therapeutic exercise and therapeutic activities  Decreased proprioception - neuro re-education and therapeutic activities  Decreased function - therapeutic activities  Impaired posture -  neuro re-education    Therapy Evaluation Codes:   1) History comprised of:   Personal factors that impact the plan of care:      None.    Comorbidity factors that impact the plan of care are:      None.     Medications impacting care: None.  2) Examination of Body Systems comprised of:   Body structures and functions that impact the plan of care:      Lumbar spine.   Activity limitations that impact the plan of care are:      Bending, Driving, Lifting and Sitting.  3) Clinical presentation characteristics are:   Stable/Uncomplicated.  4) Decision-Making    Low complexity using standardized patient assessment instrument and/or measureable assessment of functional outcome.  Cumulative Therapy Evaluation is: Low complexity.    Previous and current functional limitations:  (See Goal Flow Sheet for this information)    Short term and Long term goals: (See Goal Flow Sheet for this information)     Communication ability:  Patient appears to be able to clearly communicate and understand verbal and written communication and follow directions correctly.  Treatment Explanation - The following has been discussed with the patient:   RX ordered/plan of care  Anticipated outcomes  Possible risks and side effects  This patient would benefit from PT intervention to resume normal activities.   Rehab potential is good.    Frequency:  1 X week, once daily  Duration:  for 6 weeks  Discharge Plan:  Achieve all LTG.  Independent in home treatment program.  Reach maximal therapeutic benefit.    Please refer to the daily flowsheet for treatment today, total treatment time and time spent performing 1:1 timed codes.

## 2018-09-06 ENCOUNTER — OFFICE VISIT (OUTPATIENT)
Dept: OBGYN | Facility: CLINIC | Age: 43
End: 2018-09-06
Payer: COMMERCIAL

## 2018-09-06 ENCOUNTER — RADIANT APPOINTMENT (OUTPATIENT)
Dept: MAMMOGRAPHY | Facility: CLINIC | Age: 43
End: 2018-09-06
Payer: COMMERCIAL

## 2018-09-06 VITALS
HEART RATE: 80 BPM | HEIGHT: 65 IN | SYSTOLIC BLOOD PRESSURE: 124 MMHG | DIASTOLIC BLOOD PRESSURE: 80 MMHG | BODY MASS INDEX: 31.12 KG/M2 | WEIGHT: 186.8 LBS

## 2018-09-06 DIAGNOSIS — Z12.31 VISIT FOR SCREENING MAMMOGRAM: ICD-10-CM

## 2018-09-06 DIAGNOSIS — Z12.4 CERVICAL CANCER SCREENING: ICD-10-CM

## 2018-09-06 DIAGNOSIS — Z01.419 ENCOUNTER FOR GYNECOLOGICAL EXAMINATION WITHOUT ABNORMAL FINDING: Primary | ICD-10-CM

## 2018-09-06 DIAGNOSIS — M25.552 PAIN IN JOINT INVOLVING LEFT PELVIC REGION AND THIGH: ICD-10-CM

## 2018-09-06 PROCEDURE — 99386 PREV VISIT NEW AGE 40-64: CPT | Performed by: OBSTETRICS & GYNECOLOGY

## 2018-09-06 PROCEDURE — G0145 SCR C/V CYTO,THINLAYER,RESCR: HCPCS | Performed by: OBSTETRICS & GYNECOLOGY

## 2018-09-06 PROCEDURE — 77067 SCR MAMMO BI INCL CAD: CPT | Mod: TC

## 2018-09-06 PROCEDURE — 87624 HPV HI-RISK TYP POOLED RSLT: CPT | Performed by: OBSTETRICS & GYNECOLOGY

## 2018-09-06 ASSESSMENT — ANXIETY QUESTIONNAIRES
2. NOT BEING ABLE TO STOP OR CONTROL WORRYING: NOT AT ALL
3. WORRYING TOO MUCH ABOUT DIFFERENT THINGS: NOT AT ALL
7. FEELING AFRAID AS IF SOMETHING AWFUL MIGHT HAPPEN: NOT AT ALL
5. BEING SO RESTLESS THAT IT IS HARD TO SIT STILL: NOT AT ALL
IF YOU CHECKED OFF ANY PROBLEMS ON THIS QUESTIONNAIRE, HOW DIFFICULT HAVE THESE PROBLEMS MADE IT FOR YOU TO DO YOUR WORK, TAKE CARE OF THINGS AT HOME, OR GET ALONG WITH OTHER PEOPLE: NOT DIFFICULT AT ALL
GAD7 TOTAL SCORE: 0
1. FEELING NERVOUS, ANXIOUS, OR ON EDGE: NOT AT ALL
6. BECOMING EASILY ANNOYED OR IRRITABLE: NOT AT ALL

## 2018-09-06 ASSESSMENT — PATIENT HEALTH QUESTIONNAIRE - PHQ9: 5. POOR APPETITE OR OVEREATING: NOT AT ALL

## 2018-09-06 NOTE — PROGRESS NOTES
May is a 43 year old No obstetric history on file. female who presents for annual exam.     Besides routine health maintenance, she has no other health concerns today .    HPI:  The patient's PCP is  Edna Vasquez MD.      No gyn exam in 20yrs. Didn't have health insurance.   Hx of ovarian cysts when she was younger.   Has pelvic pain in left side.   Does not think she's ever had abnormal paps.   Went on OCPs as teen for heavy periods.   Sister with endometriosis.     Periods have become heavier over the years. Are regular each month. Was getting left sided pain during ovulation/menstruation. Now is hurting more constantly over the last week. Two nights ago was awoken by the pain. Using heating pad/ibuprofen.     Condoms for contraception    Declines flu shot.     GYNECOLOGIC HISTORY:    Patient's last menstrual period was 08/15/2018 (exact date).  Her current contraception method is: none.  She  reports that she has never smoked. She has never used smokeless tobacco.    Patient is sexually active.  STD testing offered?  Declined  Last PHQ-9 score on record =   PHQ-9 SCORE 2018   Total Score 0     Last GAD7 score on record =   JOSE-7 SCORE 2018   Total Score 0     Alcohol Score = 2    HEALTH MAINTENANCE:  Cholesterol: (No results found for: CHOL   Last Mammo: NA, Result: not applicable, Next Mammo: Due   Pap: (No results found for: PAP )   Colonoscopy:  NA, Result: not applicable, Next Colonoscopy: 9 years.  Dexa:  NA    Health maintenance updated:  yes    HISTORY:  Obstetric History       T0      L0     SAB0   TAB0   Ectopic0   Multiple0   Live Births0           Patient Active Problem List   Diagnosis     CARDIOVASCULAR SCREENING; LDL GOAL LESS THAN 160     Generalized pruritus     Allergic state     Acute left-sided low back pain with left-sided sciatica     Past Surgical History:   Procedure Laterality Date     TONSILLECTOMY & ADENOIDECTOMY        Social History   Substance Use  Topics     Smoking status: Never Smoker     Smokeless tobacco: Never Used     Alcohol use Yes      Comment: 1-2 drinks per week       Problem (# of Occurrences) Relation (Name,Age of Onset)    Anesthesia Reaction (1) Sister    CLOTTING DISORDER (3) Mother, Maternal Grandmother, Sister    Chronic Obstructive Pulmonary Disease (1) Mother    Colon Polyps (1) Mother    Coronary Artery Disease (1) Maternal Grandmother    Diabetes (1) Maternal Grandmother    Genetic Disorder (1) Mother    Heart Failure (2) Maternal Grandmother, Paternal Grandfather    Hypertension (2) Mother, Maternal Grandmother    Leukemia (1) Maternal Grandfather    Low Back Problems (1) Sister    Lupus (1) Mother    Migraines (2) Mother, Sister    Other - See Comments (3) Mother: circulation problems, ovarian tumor, Mast Cell Activation Disorder, common variable immuno-deficiency, Maternal Grandmother: circulation problems, Sister: immune disorder,     Other Cancer (1) Maternal Grandfather    Seasonal/Environmental Allergies (2) Mother, Sister    Seizure Disorder (2) Mother, Sister    Substance Abuse (1) Father    Thyroid Disease (1) Sister            Current Outpatient Prescriptions   Medication Sig     albuterol (PROAIR HFA/PROVENTIL HFA/VENTOLIN HFA) 108 (90 Base) MCG/ACT Inhaler Inhale 2 puffs into the lungs every 6 hours as needed for shortness of breath / dyspnea or wheezing     diphenhydrAMINE (BENADRYL) 25 MG capsule Take 25 mg by mouth every 6 hours as needed for itching or allergies     doxepin (SINEQUAN) 10 MG capsule Take 10 mg by mouth At Bedtime     fexofenadine (ALLEGRA ALLERGY) 180 MG tablet Take 180 mg by mouth 2 times daily     Fish Oil-Cholecalciferol (FISH OIL + D3) 7074-1064 MG-UNIT CAPS Take 2 capsules by mouth daily     montelukast (SINGULAIR) 10 MG tablet Take 10 mg by mouth At Bedtime     Multiple Vitamins-Minerals (MULTIVITAMIN ADULT) TABS Take 1 tablet by mouth daily     RaNITidine HCl (ZANTAC PO) Take 150 mg by mouth 2  "times daily      No current facility-administered medications for this visit.      Allergies   Allergen Reactions     Seasonal Allergies      Latex Rash       Past medical, surgical, social and family histories were reviewed and updated in EPIC.    ROS:   12 point review of systems negative other than symptoms noted below.  Head: Ringing  Gastrointestinal: Bloating and Constipation  Genitourinary: Cramps, Heavy Bleeding with Period, Painful Accident and Pelvic Pain    EXAM:  /80  Pulse 80  Ht 5' 5\" (1.651 m)  Wt 186 lb 12.8 oz (84.7 kg)  LMP 08/15/2018 (Exact Date)  Breastfeeding? No  BMI 31.09 kg/m2   BMI: Body mass index is 31.09 kg/(m^2).    PHYSICAL EXAM:  Constitutional:  Appearance: Well nourished, well developed, alert, in no acute distress  Neck:  Lymph Nodes:  No lymphadenopathy present    Thyroid:  Gland size normal, nontender, no nodules or masses present  on palpation  Chest:  Respiratory Effort:  Breathing unlabored  Cardiovascular:    Heart: Auscultation:  Regular rate, normal rhythm, no murmurs present  Breasts: Inspection of Breasts:  No lymphadenopathy present., Palpation of Breasts and Axillae:  No masses present on palpation, no breast tenderness., Axillary Lymph Nodes:  No lymphadenopathy present. and No nodularity, asymmetry or nipple discharge bilaterally.  Gastrointestinal:   Abdominal Examination:  Abdomen nontender to palpation, tone normal without rigidity or guarding, no masses present, umbilicus without lesions   Liver and Spleen:  No hepatomegaly present, liver nontender to palpation    Hernias:  No hernias present  Lymphatic: Lymph Nodes:  No other lymphadenopathy present  Skin:  General Inspection:  No rashes present, no lesions present, no areas of  discoloration    Genitalia and Groin:  No rashes present, no lesions present, no areas of  discoloration, no masses present  Neurologic/Psychiatric:    Mental Status:  Oriented X3     Pelvic Exam:  External Genitalia:  "    Normal appearance for age, no discharge present, no tenderness present, no inflammatory lesions present, color normal  Vagina:     Normal vaginal vault without central or paravaginal defects, no discharge present, 5MM NODULE IN LEFT LATERAL FORNIX-?ENDO, no masses present  Bladder:     Nontender to palpation  Urethra:   Urethral Body:  Urethra palpation normal, urethra structural support normal   Urethral Meatus:  No erythema or lesions present  Cervix:     Appearance healthy, no lesions present, nontender to palpation, no bleeding present  Uterus:     Uterus: firm, normal sized and slightly tender to palpation, midplane in position.   Adnexa:     Bilateral adnexal tenderness present, no adnexal masses present  Perineum:     Perineum within normal limits, no evidence of trauma, no rashes or skin lesions present  Anus:     Anus within normal limits, no hemorrhoids present  Inguinal Lymph Nodes:     No lymphadenopathy present  Pubic Hair:     Normal pubic hair distribution for age  Genitalia and Groin:     No rashes present, no lesions present, no areas of discoloration, no masses present      COUNSELING:   Reviewed preventive health counseling, as reflected in patient instructions       Osteoporosis Prevention/Bone Health    BMI: Body mass index is 31.09 kg/(m^2).  Weight management plan: Discussed healthy diet and exercise guidelines and patient will follow up in 12 months in clinic to re-evaluate.    ASSESSMENT:  43 year old female with satisfactory annual exam.    ICD-10-CM    1. Encounter for gynecological examination without abnormal finding Z01.419 Pap imaged thin layer screen with HPV - recommended age 30 - 65     HPV High Risk Types DNA Cervical   2. Cervical cancer screening Z12.4 Pap imaged thin layer screen with HPV - recommended age 30 - 65     HPV High Risk Types DNA Cervical   3. Pain in joint involving left pelvic region and thigh M25.552 US Transvaginal Non OB       PLAN:  -Pap/hpv obtained for  cervical cancer screening. Reviewed guidelines-pap q 3yrs until age 30 when co-testing q 5 years.  -Breast self awareness discussed. Due for mammogram.  -Discussed exercise-making plan, strength training. Nutrition encouraged.  -Colonoscopy age 50  -Osteoporosis prevention discussed.  -Pelvic pain. Family hx endo. Nodule in left ant fornix, ?endo. Will check pelvic US for sources pain.   -Return one year for next annual exam      Donna Hou Masters, DO

## 2018-09-06 NOTE — MR AVS SNAPSHOT
After Visit Summary   9/6/2018    May Aldana    MRN: [id number]           Patient Information     Date Of Birth          1975        Visit Information        Provider Department      9/6/2018 3:00 PM Donna Valiente,  [facility]        Today's Diagnoses     Encounter for gynecological examination without abnormal finding    -  1    Cervical cancer screening        Pain in joint involving left pelvic region and thigh           Follow-ups after your visit        Follow-up notes from your care team     Return in about 1 year (around 9/6/2019).      Your next 10 appointments already scheduled     Sep 20, 2018 11:15 AM CDT   US TRANSVAGINAL NON OB with WEUS1   [facility] ([facility])    [address] [phone]           Please bring a list of your medicines (including vitamins, minerals and over-the-counter drugs). Also, tell your doctor about any allergies you may have. Wear comfortable clothes and leave your valuables at home.  You do not need to do anything special to prepare for your exam.  Please call the Imaging Department at your exam site with any questions.            Sep 20, 2018 11:40 AM CDT   Office Visit with Donna Valiente,    [facility] ([facility]    [address] [id number] [phone]           Bring a current list of meds and any records pertaining to this visit. For Physicals, please bring immunization records and any forms needing to be filled out. Please arrive 10 minutes early to complete paperwork.              Future tests that were ordered for you today     Open Future Orders        Priority Expected Expires Ordered    US Transvaginal Non OB Routine  9/7/2019 9/6/2018            Who to contact     If you have questions or need follow up information about today's  "clinic visit or your schedule please contact [facility] [place] directly at [phone].  Normal or non-critical lab and imaging results will be communicated to you by MyChart, letter or phone within 4 business days after the clinic has received the results. If you do not hear from us within 7 days, please contact the clinic through [employer] or phone. If you have a critical or abnormal lab result, we will notify you by phone as soon as possible.  Submit refill requests through [employer] or call your pharmacy and they will forward the refill request to us. Please allow 3 business days for your refill to be completed.          Additional Information About Your Visit        [employer] Information     [employer] gives you secure access to your electronic health record. If you see a primary care provider, you can also send messages to your care team and make appointments. If you have questions, please call your primary care clinic.  If you do not have a primary care provider, please call [phone] and they will assist you.        Care EveryWhere ID     This is your Care EveryWhere ID. This could be used by other organizations to access your [facility] medical records  [id number]        Your Vitals Were     Pulse Height Last Period Breastfeeding? BMI (Body Mass Index)       80 5' 5\" (1.651 m) 08/15/2018 (Exact Date) No 31.09 kg/m2        Blood Pressure from Last 3 Encounters:   09/06/18 124/80   05/23/18 116/70   05/18/18 112/63    Weight from Last 3 Encounters:   09/06/18 186 lb 12.8 oz (84.7 kg)   05/23/18 183 lb (83 kg)   05/18/18 183 lb 4.8 oz (83.1 kg)              We Performed the Following     HPV High Risk Types DNA Cervical     Pap imaged thin layer screen with HPV - recommended age 30 - 65        Primary Care Provider Office Phone # Fax #    Edna Elie Vasquez [phone] [address]  [place] [address]        Equal Access to Services     TWILA TOVAR AH: Alfredo moncada " Sopatriziaali, wajensenda luqadaha, qaybta kaalmada bob, bianca whitelm darren. So [facility] [phone].    ATENCIÓN: Si renee barnes, tiene a hanna disposición servicios gratuitos de asistencia lingüística. Denny al [phone].    We comply with applicable federal civil rights laws and [place] laws. We do not discriminate on the basis of race, color, national origin, age, disability, sex, sexual orientation, or gender identity.            Thank you!     Thank you for choosing [facility] [place] [place]  for your care. Our goal is always to provide you with excellent care. Hearing back from our patients is one way we can continue to improve our services. Please take a few minutes to complete the written survey that you may receive in the mail after your visit with us. Thank you!             Your Updated Medication List - Protect others around you: Learn how to safely use, store and throw away your medicines at www.disposemymeds.org.          This list is accurate as of 9/6/18  3:48 PM.  Always use your most recent med list.                   Brand Name Dispense Instructions for use Diagnosis    albuterol 108 (90 Base) MCG/ACT inhaler    PROAIR HFA/PROVENTIL HFA/VENTOLIN HFA     Inhale 2 puffs into the lungs every 6 hours as needed for shortness of breath / dyspnea or wheezing        ALLEGRA ALLERGY 180 MG tablet   Generic drug:  fexofenadine      Take 180 mg by mouth 2 times daily        BENADRYL 25 MG capsule   Generic drug:  diphenhydrAMINE      Take 25 mg by mouth every 6 hours as needed for itching or allergies        doxepin 10 MG capsule    SINEquan     Take 10 mg by mouth At Bedtime        FISH OIL + D3 2128-2722 MG-UNIT Caps      Take 2 capsules by mouth daily        MULTIVITAMIN ADULT Tabs      Take 1 tablet by mouth daily        SINGULAIR 10 MG tablet   Generic drug:  montelukast      Take 10 mg by mouth At Bedtime        ZANTAC PO      Take 150 mg by mouth 2 times daily

## 2018-09-07 ASSESSMENT — PATIENT HEALTH QUESTIONNAIRE - PHQ9: SUM OF ALL RESPONSES TO PHQ QUESTIONS 1-9: 0

## 2018-09-07 ASSESSMENT — ANXIETY QUESTIONNAIRES: GAD7 TOTAL SCORE: 0

## 2018-09-11 LAB
COPATH REPORT: NORMAL
PAP: NORMAL

## 2018-09-12 LAB
FINAL DIAGNOSIS: NORMAL
HPV HR 12 DNA CVX QL NAA+PROBE: NEGATIVE
HPV16 DNA SPEC QL NAA+PROBE: NEGATIVE
HPV18 DNA SPEC QL NAA+PROBE: NEGATIVE
SPECIMEN DESCRIPTION: NORMAL
SPECIMEN SOURCE CVX/VAG CYTO: NORMAL

## 2018-09-20 ENCOUNTER — OFFICE VISIT (OUTPATIENT)
Dept: OBGYN | Facility: CLINIC | Age: 43
End: 2018-09-20
Attending: OBSTETRICS & GYNECOLOGY
Payer: COMMERCIAL

## 2018-09-20 ENCOUNTER — RADIANT APPOINTMENT (OUTPATIENT)
Dept: ULTRASOUND IMAGING | Facility: CLINIC | Age: 43
End: 2018-09-20
Attending: OBSTETRICS & GYNECOLOGY
Payer: COMMERCIAL

## 2018-09-20 VITALS — DIASTOLIC BLOOD PRESSURE: 78 MMHG | WEIGHT: 183 LBS | BODY MASS INDEX: 30.45 KG/M2 | SYSTOLIC BLOOD PRESSURE: 124 MMHG

## 2018-09-20 DIAGNOSIS — M25.552 PAIN IN JOINT INVOLVING LEFT PELVIC REGION AND THIGH: ICD-10-CM

## 2018-09-20 DIAGNOSIS — D25.1 INTRAMURAL LEIOMYOMA OF UTERUS: ICD-10-CM

## 2018-09-20 DIAGNOSIS — N94.6 DYSMENORRHEA: Primary | ICD-10-CM

## 2018-09-20 PROCEDURE — 99214 OFFICE O/P EST MOD 30 MIN: CPT | Performed by: OBSTETRICS & GYNECOLOGY

## 2018-09-20 PROCEDURE — 76830 TRANSVAGINAL US NON-OB: CPT | Performed by: OBSTETRICS & GYNECOLOGY

## 2018-09-20 RX ORDER — NORGESTIMATE AND ETHINYL ESTRADIOL 0.25-0.035
1 KIT ORAL DAILY
Qty: 84 TABLET | Refills: 4 | Status: SHIPPED | OUTPATIENT
Start: 2018-09-20 | End: 2018-11-08

## 2018-09-20 NOTE — PROGRESS NOTES
SUBJECTIVE:                                                   May Aldana is a 43 year old female who presents to clinic today for the following health issue(s):  Patient presents with:  Ultrasound: endometriosis        HPI:  No new changes.  No plans for family.   Reviewed her US from today.    Review of PMH, SocHx, SurHx, FHx, medications completed. Epic updated.        Patient's last menstrual period was 2018 (exact date)..   Patient is sexually active, .  Using condoms for contraception.    reports that she has never smoked. She has never used smokeless tobacco.    STD testing offered?  Declined    Health maintenance updated:  yes    Today's PHQ-2 Score:   PHQ-2 (  [employer]) 2018   Q1: Little interest or pleasure in doing things 0   Q2: Feeling down, depressed or hopeless 0   PHQ-2 Score 0     Today's PHQ-9 Score:   PHQ-9 SCORE 2018   Total Score 0     Today's JOSE-7 Score:   JOSE-7 SCORE 2018   Total Score 0       Problem list and histories reviewed & adjusted, as indicated.  Additional history: as documented.    Patient Active Problem List   Diagnosis     CARDIOVASCULAR SCREENING; LDL GOAL LESS THAN 160     Generalized pruritus     Allergic state     Acute left-sided low back pain with left-sided sciatica     Past Surgical History:   Procedure Laterality Date     TONSILLECTOMY & ADENOIDECTOMY        Social History   Substance Use Topics     Smoking status: Never Smoker     Smokeless tobacco: Never Used     Alcohol use Yes      Comment: 1-2 drinks per week       Problem (# of Occurrences) Relation (Name,Age of Onset)    Anesthesia Reaction (1) Sister    CLOTTING DISORDER (3) Mother, Maternal Grandmother, Sister    Chronic Obstructive Pulmonary Disease (1) Mother    Colon Polyps (1) Mother    Coronary Artery Disease (1) Maternal Grandmother    Diabetes (1) Maternal Grandmother    Genetic Disorder (1) Mother    Heart Failure (2) Maternal Grandmother, Paternal  Grandfather    Hypertension (2) Mother, Maternal Grandmother    Leukemia (1) Maternal Grandfather    Low Back Problems (1) Sister    Lupus (1) Mother    Migraines (2) Mother, Sister    Other - See Comments (3) Mother: circulation problems, ovarian tumor, Mast Cell Activation Disorder, common variable immuno-deficiency, Maternal Grandmother: circulation problems, Sister: immune disorder,     Other Cancer (1) Maternal Grandfather    Seasonal/Environmental Allergies (2) Mother, Sister    Seizure Disorder (2) Mother, Sister    Substance Abuse (1) Father    Thyroid Disease (1) Sister            Current Outpatient Prescriptions   Medication Sig     albuterol (PROAIR HFA/PROVENTIL HFA/VENTOLIN HFA) 108 (90 Base) MCG/ACT Inhaler Inhale 2 puffs into the lungs every 6 hours as needed for shortness of breath / dyspnea or wheezing     diphenhydrAMINE (BENADRYL) 25 MG capsule Take 25 mg by mouth every 6 hours as needed for itching or allergies     doxepin (SINEQUAN) 10 MG capsule Take 10 mg by mouth At Bedtime     fexofenadine (ALLEGRA ALLERGY) 180 MG tablet Take 180 mg by mouth 2 times daily     Fish Oil-Cholecalciferol (FISH OIL + D3) 6206-8994 MG-UNIT CAPS Take 2 capsules by mouth daily     montelukast (SINGULAIR) 10 MG tablet Take 10 mg by mouth At Bedtime     Multiple Vitamins-Minerals (MULTIVITAMIN ADULT) TABS Take 1 tablet by mouth daily     norgestimate-ethinyl estradiol (ORTHO-CYCLEN, SPRINTEC) 0.25-35 MG-MCG per tablet Take 1 tablet by mouth daily     RaNITidine HCl (ZANTAC PO) Take 150 mg by mouth 2 times daily      No current facility-administered medications for this visit.      Allergies   Allergen Reactions     Seasonal Allergies      Latex Rash       ROS:  12 point review of systems negative other than symptoms noted below.    OBJECTIVE:     /78  Wt 183 lb (83 kg)  LMP 09/16/2018 (Exact Date)  Breastfeeding? No  BMI 30.45 kg/m2  Body mass index is 30.45 kg/(m^2).    Exam:  Constitutional:  Appearance:  Well nourished, well developed alert, in no acute distress  Chest:  Respiratory Effort:  Breathing unlabored  Neurologic/Psychiatric:  Mental Status:  Oriented X3      In-Clinic Test Results:  Results for orders placed or performed in visit on 09/20/18 (from the past 24 hour(s))   US Transvaginal Non OB    Narrative    US Transvaginal Non OB    Order #: [id number] Accession #: [id number]         Study Notes        Zina Valle on 9/20/2018 11:38 AM     Gynecological Ultrasound Report  Pelvic U/S - Transvaginal  [facility]  Referring Provider: Dr. Thompson Masters  Sonographer:  Zina Valle RDMS  Indication: endometriosis  LMP:09/16/18History: DUB    Gynecological Ultrasonography:   Uterus: anteverted  Size: 8.0 x 6.9 x 5.5cm  Findings: multiple fibroids 2 measured: anterior 3.8x 3.6x 4.7cm, 2   posterior= 3.0x 2.5x 2.5cm   Endometrium: Thickness total 4.4mm  Right Ovary: 3.3 x 2.2 x 2.6cm   Left Ovary: 3.2 x 2.4 x 1.8cm  Cul de Sac/Pouch of Joaquín: no ff      Impression:   Fibroid uterus, 2 largest anteriorly 4.7cm, posteriorly 3cm.  Normal adnexa. No free fluid.    Donna Hou Masters, DO                           ASSESSMENT/PLAN:                                                        ICD-10-CM    1. Dysmenorrhea N94.6 norgestimate-ethinyl estradiol (ORTHO-CYCLEN, SPRINTEC) 0.25-35 MG-MCG per tablet   2. Intramural leiomyoma of uterus D25.1        -Reviewed etiologies of dysmenorrhea, including possibility of endometriosis. Discussed how these are diagnosed/treated. Reviewed the results of her US , and that fibroids were seen, nothing else. Cannot see endo on US. Discussed what fibroids were, natural history, symptoms and management. Nothing further to do at this time for those.   Pt is bothered by her periods and seems to be progressing. Rec trial of medications, discussed hormonal (E/P, P) and non hormonal meds. Rec starting with hromonal meds. Due to her histamine disorder, we  focused on starting with meds not as likely to trigger a flare. Will start with OCPs, cyclic. F/U 2mo and if doing well, can extend or go continuously. Discussed risks of OCP, including VTE. Has no contraindications.   Questions answered. Pt happy with plan. Discussed how to start pills and back up x 1 mo.     (25 minutes was spent face to face with the patient today discussing her history, diagnosis, and follow-up plan as noted above. Over 50% of the visit was spent in counseling and coordination of care.)      Donna Hou Masters, [facility] FOR [facility]

## 2018-09-20 NOTE — MR AVS SNAPSHOT
After Visit Summary   9/20/2018    May Aldana    MRN: [id number]           Patient Information     Date Of Birth          1975        Visit Information        Provider Department      9/20/2018 11:40 AM Donna Valiente DO [facility]        Today's Diagnoses     Dysmenorrhea    -  1       Follow-ups after your visit        Follow-up notes from your care team     Return in about 2 months (around 11/20/2018).      Your next 10 appointments already scheduled     Nov 08, 2018 10:30 AM CST   Office Visit with oDnna Valiente DO   [facility] ([facility])    [address] [place] [address] [phone]           Bring a current list of meds and any records pertaining to this visit. For Physicals, please bring immunization records and any forms needing to be filled out. Please arrive 10 minutes early to complete paperwork.              Who to contact     If you have questions or need follow up information about today's clinic visit or your schedule please contact [facility] [place] directly at [phone].  Normal or non-critical lab and imaging results will be communicated to you by AirSense Wirelesshart, letter or phone within 4 business days after the clinic has received the results. If you do not hear from us within 7 days, please contact the clinic through AirSense Wirelesshart or phone. If you have a critical or abnormal lab result, we will notify you by phone as soon as possible.  Submit refill requests through Stonewedge or call your pharmacy and they will forward the refill request to us. Please allow 3 business days for your refill to be completed.          Additional Information About Your Visit        MyChart Information     Stonewedge gives you secure access to your electronic health record. If you see a primary care provider, you can also send messages to your care team and make appointments. If  you have questions, please call your primary care clinic.  If you do not have a primary care provider, please call [phone] and they will assist you.        Care EveryWhere ID     This is your Care EveryWhere ID. This could be used by other organizations to access your [place] medical records  [id number]        Your Vitals Were     Last Period Breastfeeding? BMI (Body Mass Index)             09/16/2018 (Exact Date) No 30.45 kg/m2          Blood Pressure from Last 3 Encounters:   09/20/18 124/78   09/06/18 124/80   05/23/18 116/70    Weight from Last 3 Encounters:   09/20/18 183 lb (83 kg)   09/06/18 186 lb 12.8 oz (84.7 kg)   05/23/18 183 lb (83 kg)              Today, you had the following     No orders found for display         Today's Medication Changes          These changes are accurate as of 9/20/18 12:41 PM.  If you have any questions, ask your nurse or doctor.               Start taking these medicines.        Dose/Directions    norgestimate-ethinyl estradiol 0.25-35 MG-MCG per tablet   Commonly known as:  ORTHO-CYCLEN, SPRINTEC   Used for:  Dysmenorrhea   Started by:  Donna Valiente,         Dose:  1 tablet   Take 1 tablet by mouth daily   Quantity:  84 tablet   Refills:  4            Where to get your medicines      These medications [facility] to [facility] [address] [place] [address] AT [address] [place] [address] & [address] [address]     Phone:  [phone]     norgestimate-ethinyl estradiol 0.25-35 MG-MCG per tablet                Primary Care Provider Office Phone # Fax #    Edna Elie Vasquez [phone] [address] [address]        Equal Access to Services     TWILA TOVAR : Alfredo Whalen, catracho cruz, qatoribio rojas, bianca banks. [facility] [phone].    ATENCIÓN: Si habla español, tiene a hanna disposición servicios gratuitos de  asistencia lingüística. Denny al [phone].    We comply with applicable federal civil rights laws and [place] laws. We do not discriminate on the basis of race, color, national origin, age, disability, sex, sexual orientation, or gender identity.            Thank you!     Thank you for choosing [facility] [place]  for your care. Our goal is always to provide you with excellent care. Hearing back from our patients is one way we can continue to improve our services. Please take a few minutes to complete the written survey that you may receive in the mail after your visit with us. Thank you!             Your Updated Medication List - Protect others around you: Learn how to safely use, store and throw away your medicines at www.disposemymeds.org.          This list is accurate as of 9/20/18 12:41 PM.  Always use your most recent med list.                   Brand Name Dispense Instructions for use Diagnosis    albuterol 108 (90 Base) MCG/ACT inhaler    PROAIR HFA/PROVENTIL HFA/VENTOLIN HFA     Inhale 2 puffs into the lungs every 6 hours as needed for shortness of breath / dyspnea or wheezing        ALLEGRA ALLERGY 180 MG tablet   Generic drug:  fexofenadine      Take 180 mg by mouth 2 times daily        BENADRYL 25 MG capsule   Generic drug:  diphenhydrAMINE      Take 25 mg by mouth every 6 hours as needed for itching or allergies        doxepin 10 MG capsule    SINEquan     Take 10 mg by mouth At Bedtime        FISH OIL + D3 5200-2181 MG-UNIT Caps      Take 2 capsules by mouth daily        MULTIVITAMIN ADULT Tabs      Take 1 tablet by mouth daily        norgestimate-ethinyl estradiol 0.25-35 MG-MCG per tablet    ORTHO-CYCLEN, SPRINTEC    84 tablet    Take 1 tablet by mouth daily    Dysmenorrhea       SINGULAIR 10 MG tablet   Generic drug:  montelukast      Take 10 mg by mouth At Bedtime        ZANTAC PO      Take 150 mg by mouth 2 times daily

## 2018-11-08 ENCOUNTER — OFFICE VISIT (OUTPATIENT)
Dept: OBGYN | Facility: CLINIC | Age: 43
End: 2018-11-08
Payer: COMMERCIAL

## 2018-11-08 VITALS
WEIGHT: 185.8 LBS | SYSTOLIC BLOOD PRESSURE: 128 MMHG | DIASTOLIC BLOOD PRESSURE: 70 MMHG | BODY MASS INDEX: 30.96 KG/M2 | HEIGHT: 65 IN | HEART RATE: 80 BPM

## 2018-11-08 DIAGNOSIS — N94.6 DYSMENORRHEA: ICD-10-CM

## 2018-11-08 PROCEDURE — 99213 OFFICE O/P EST LOW 20 MIN: CPT | Performed by: OBSTETRICS & GYNECOLOGY

## 2018-11-08 RX ORDER — NORGESTIMATE AND ETHINYL ESTRADIOL 0.25-0.035
1 KIT ORAL DAILY
Qty: 112 TABLET | Refills: 6 | Status: SHIPPED | OUTPATIENT
Start: 2018-11-08 | End: 2019-06-25

## 2018-11-08 NOTE — MR AVS SNAPSHOT
After Visit Summary   11/8/2018    May Aldana    MRN: [id number]           Patient Information     Date Of Birth          1975        Visit Information        Provider Department      11/8/2018 10:30 AM Donna Valiente DO [facility]        Today's Diagnoses     Dysmenorrhea           Follow-ups after your visit        Follow-up notes from your care team     Return in about 6 months (around 5/8/2019).      Your next 10 appointments already scheduled     May 09, 2019  9:30 AM CDT   Office Visit with Donna Valiente DO   [facility] ([facility])    [address] [place] [address] [phone]           Bring a current list of meds and any records pertaining to this visit. For Physicals, please bring immunization records and any forms needing to be filled out. Please arrive 10 minutes early to complete paperwork.              Who to contact     If you have questions or need follow up information about today's clinic visit or your schedule please contact [facility] directly at [phone].  Normal or non-critical lab and imaging results will be communicated to you by UniQurehart, letter or phone within 4 business days after the clinic has received the results. If you do not hear from us within 7 days, please contact the clinic through Nubankt or phone. If you have a critical or abnormal lab result, we will notify you by phone as soon as possible.  Submit refill requests through eEvent or call your pharmacy and they will forward the refill request to us. Please allow 3 business days for your refill to be completed.          Additional Information About Your Visit        MyChart Information     eEvent gives you secure access to your electronic health record. If you see a primary care provider, you can also send messages to your care team and make appointments. If you have  "questions, please call your primary care clinic.  If you do not have a primary care provider, please call [phone] and they will assist you.        Care EveryWhere ID     This is your Care EveryWhere ID. This could be used by other organizations to access your [place] medical records  [id number]        Your Vitals Were     Pulse Height Last Period Breastfeeding? BMI (Body Mass Index)       80 5' 5\" (1.651 m) 11/04/2018 No 30.92 kg/m2        Blood Pressure from Last 3 Encounters:   11/08/18 128/70   09/20/18 124/78   09/06/18 124/80    Weight from Last 3 Encounters:   11/08/18 185 lb 12.8 oz (84.3 kg)   09/20/18 183 lb (83 kg)   09/06/18 186 lb 12.8 oz (84.7 kg)              Today, you had the following     No orders found for display         Where to get your medicines      These medications were sent to [facility] [address] [place] [address] [address] [facility] [address] [facility] [address]     Phone:  [phone]     norgestimate-ethinyl estradiol 0.25-35 MG-MCG per tablet          Primary Care Provider Office Phone # Fax #    Edna Elie Vasquez [phone] [address] [address]        Equal Access to Services     RAFA OTVAR AH: Hadii everardo hollando Soada, waaxda luqadaha, qaybta kaalmada jtyalola, bianca banks. [facility] [phone].    ATENCIÓN: Si habla español, tiene a hanna disposición servicios gratuitos de asistencia lingüística. [place] al [phone].    We comply with applicable federal civil rights laws and [place] laws. We do not discriminate on the basis of race, color, national origin, age, disability, sex, sexual orientation, or gender identity.            Thank you!     Thank you for choosing [facility]  for your care. Our goal is always to provide you with excellent care. Hearing back from our patients is one way we can continue to improve our " services. Please take a few minutes to complete the written survey that you may receive in the mail after your visit with us. Thank you!             Your Updated Medication List - Protect others around you: Learn how to safely use, store and throw away your medicines at www.disposemymeds.org.          This list is accurate as of 11/8/18 11:04 AM.  Always use your most recent med list.                   Brand Name Dispense Instructions for use Diagnosis    albuterol 108 (90 Base) MCG/ACT inhaler    PROAIR HFA/PROVENTIL HFA/VENTOLIN HFA     Inhale 2 puffs into the lungs every 6 hours as needed for shortness of breath / dyspnea or wheezing        ALLEGRA ALLERGY 180 MG tablet   Generic drug:  fexofenadine      Take 180 mg by mouth 2 times daily        BENADRYL 25 MG capsule   Generic drug:  diphenhydrAMINE      Take 25 mg by mouth every 6 hours as needed for itching or allergies        doxepin 10 MG capsule    SINEquan     Take 10 mg by mouth At Bedtime        FISH OIL + D3 0908-3115 MG-UNIT Caps      Take 2 capsules by mouth daily        MULTIVITAMIN ADULT Tabs      Take 1 tablet by mouth daily        norgestimate-ethinyl estradiol 0.25-35 MG-MCG per tablet    ORTHO-CYCLEN, SPRINTEC    112 tablet    Take 1 tablet by mouth daily    Dysmenorrhea       SINGULAIR 10 MG tablet   Generic drug:  montelukast      Take 10 mg by mouth At Bedtime        ZANTAC PO      Take 150 mg by mouth 2 times daily

## 2018-11-08 NOTE — PROGRESS NOTES
SUBJECTIVE:                                                   May Aldana is a 43 year old female who presents to clinic today for the following health issue(s):  Patient presents with:  Follow Up For: endometriosis recheck. Patient states she is still spotting for 5 days, 2 weeks after her last period.         HPI:  Started the pills, taking regularly, in  (period 16-20). Nov 4 got her period 2 days before was to start the placebo pills. Was light and now on period.    Pain/cramping is different, will be low grade cramping for longer than two really bad days.         Patient's last menstrual period was 2018..   Patient is sexually active, .  Using oral contraceptives and condoms for contraception.    reports that she has never smoked. She has never used smokeless tobacco.    STD testing offered?  Declined    Health maintenance updated:  yes    Today's PHQ-2 Score:   PHQ-2 (  [employer]) 2018   Q1: Little interest or pleasure in doing things 0   Q2: Feeling down, depressed or hopeless 0   PHQ-2 Score 0     Today's PHQ-9 Score:   PHQ-9 SCORE 2018   Total Score 0     Today's JOSE-7 Score:   JOSE-7 SCORE 2018   Total Score 0       Problem list and histories reviewed & adjusted, as indicated.  Additional history: as documented.    Patient Active Problem List   Diagnosis     CARDIOVASCULAR SCREENING; LDL GOAL LESS THAN 160     Generalized pruritus     Allergic state     Acute left-sided low back pain with left-sided sciatica     Past Surgical History:   Procedure Laterality Date     TONSILLECTOMY & ADENOIDECTOMY        Social History   Substance Use Topics     Smoking status: Never Smoker     Smokeless tobacco: Never Used     Alcohol use Yes      Comment: 1-2 drinks per week       Problem (# of Occurrences) Relation (Name,Age of Onset)    Anesthesia Reaction (1) Sister    CLOTTING DISORDER (3) Mother, Maternal Grandmother, Sister    Chronic Obstructive Pulmonary Disease  (1) Mother    Colon Polyps (1) Mother    Coronary Artery Disease (1) Maternal Grandmother    Diabetes (1) Maternal Grandmother    Genetic Disorder (1) Mother    Heart Failure (2) Maternal Grandmother, Paternal Grandfather    Hypertension (2) Mother, Maternal Grandmother    Leukemia (1) Maternal Grandfather    Low Back Problems (1) Sister    Lupus (1) Mother    Migraines (2) Mother, Sister    Other - See Comments (3) Mother: circulation problems, ovarian tumor, Mast Cell Activation Disorder, common variable immuno-deficiency, Maternal Grandmother: circulation problems, Sister: immune disorder,     Other Cancer (1) Maternal Grandfather    Seasonal/Environmental Allergies (2) Mother, Sister    Seizure Disorder (2) Mother, Sister    Substance Abuse (1) Father    Thyroid Disease (1) Sister            Current Outpatient Prescriptions   Medication Sig     albuterol (PROAIR HFA/PROVENTIL HFA/VENTOLIN HFA) 108 (90 Base) MCG/ACT Inhaler Inhale 2 puffs into the lungs every 6 hours as needed for shortness of breath / dyspnea or wheezing     diphenhydrAMINE (BENADRYL) 25 MG capsule Take 25 mg by mouth every 6 hours as needed for itching or allergies     doxepin (SINEQUAN) 10 MG capsule Take 10 mg by mouth At Bedtime     fexofenadine (ALLEGRA ALLERGY) 180 MG tablet Take 180 mg by mouth 2 times daily     Fish Oil-Cholecalciferol (FISH OIL + D3) 5368-4841 MG-UNIT CAPS Take 2 capsules by mouth daily     montelukast (SINGULAIR) 10 MG tablet Take 10 mg by mouth At Bedtime     Multiple Vitamins-Minerals (MULTIVITAMIN ADULT) TABS Take 1 tablet by mouth daily     norgestimate-ethinyl estradiol (ORTHO-CYCLEN, SPRINTEC) 0.25-35 MG-MCG per tablet Take 1 tablet by mouth daily     RaNITidine HCl (ZANTAC PO) Take 150 mg by mouth 2 times daily      [DISCONTINUED] norgestimate-ethinyl estradiol (ORTHO-CYCLEN, SPRINTEC) 0.25-35 MG-MCG per tablet Take 1 tablet by mouth daily     No current facility-administered medications for this visit.   "    Allergies   Allergen Reactions     Seasonal Allergies      Latex Rash       ROS:  12 point review of systems negative other than symptoms noted below.  Gastrointestinal: Constipation  Genitourinary: Cramps  Skin: Acne    OBJECTIVE:     /70 (BP Location: Right arm, Patient Position: Sitting, Cuff Size: Adult Regular)  Pulse 80  Ht 5' 5\" (1.651 m)  Wt 185 lb 12.8 oz (84.3 kg)  LMP 11/04/2018  Breastfeeding? No  BMI 30.92 kg/m2  Body mass index is 30.92 kg/(m^2).    Exam:  Constitutional:  Appearance: Well nourished, well developed alert, in no acute distress  Chest:  Respiratory Effort:  Breathing unlabored  Neurologic/Psychiatric:  Mental Status:  Oriented X3        ASSESSMENT/PLAN:                                                        ICD-10-CM    1. Dysmenorrhea N94.6 norgestimate-ethinyl estradiol (ORTHO-CYCLEN, SPRINTEC) 0.25-35 MG-MCG per tablet       -Overall symptoms improving. Likely will continue to improve as is only on 2nd pill pack. Will have her do cyclic for 3 packs, then start extended dosing with menses q 4 mo. Discussed how to skip pills and potential for BTB. F/U 6mo or sooner prn. Questions answered.    Donna Hou Masters, DO  [facility] [place]  "

## 2019-05-07 NOTE — PROGRESS NOTES
SUBJECTIVE:                                                   May Aldana is a 44 year old female who presents to clinic today for the following health issue(s):  Patient presents with:  Follow Up: endometriosis - check on OCP      HPI:  Pain is gone-all of it. Very rarely will have mild cramping. Biggest complaint is that she is having BTB'ing. Feb was only month w/o bleeding. March spotted for 30d. Periods will still come q 3.5-4wk, despite still taking the active pills. Has done one placebo pill week-was not too painful at all.   This is the only type of pills she's been on since teenager.  Currently is in 2nd week of pack, no bleeding. LMP -.  Is taking sprintec.        Patient's last menstrual period was 2019..   Patient is sexually active, .  Using oral contraceptives and condoms for contraception.    reports that she has never smoked. She has never used smokeless tobacco.    STD testing offered?  Declined    Health maintenance updated:  yes    Today's PHQ-2 Score:   PHQ-2 (  [employer]) 2018   Q1: Little interest or pleasure in doing things 0   Q2: Feeling down, depressed or hopeless 0   PHQ-2 Score 0     Today's PHQ-9 Score:   PHQ-9 SCORE 2018   PHQ-9 Total Score 0     Today's JOSE-7 Score:   JOSE-7 SCORE 2018   Total Score 0       Problem list and histories reviewed & adjusted, as indicated.  Additional history: as documented.    Patient Active Problem List   Diagnosis     CARDIOVASCULAR SCREENING; LDL GOAL LESS THAN 160     Generalized pruritus     Allergic state     Acute left-sided low back pain with left-sided sciatica     Past Surgical History:   Procedure Laterality Date     TONSILLECTOMY & ADENOIDECTOMY        Social History     Tobacco Use     Smoking status: Never Smoker     Smokeless tobacco: Never Used   Substance Use Topics     Alcohol use: Yes     Comment: 1-2 drinks per week       Problem (# of Occurrences) Relation (Name,Age of Onset)     Anesthesia Reaction (1) Sister    Chronic Obstructive Pulmonary Disease (1) Mother    Clotting Disorder (3) Mother, Maternal Grandmother, Sister    Colon Polyps (1) Mother    Coronary Artery Disease (1) Maternal Grandmother    Diabetes (1) Maternal Grandmother    Genetic Disorder (1) Mother    Heart Failure (2) Maternal Grandmother, Paternal Grandfather    Hypertension (2) Mother, Maternal Grandmother    Leukemia (1) Maternal Grandfather    Low Back Problems (1) Sister    Lupus (1) Mother    Migraines (2) Mother, Sister    Other - See Comments (3) Mother: circulation problems, ovarian tumor, Mast Cell Activation Disorder, common variable immuno-deficiency, Maternal Grandmother: circulation problems, Sister: immune disorder,     Other Cancer (1) Maternal Grandfather    Seasonal/Environmental Allergies (2) Mother, Sister    Seizure Disorder (2) Mother, Sister    Substance Abuse (1) Father    Thyroid Disease (1) Sister            Current Outpatient Medications   Medication Sig     albuterol (PROAIR HFA/PROVENTIL HFA/VENTOLIN HFA) 108 (90 Base) MCG/ACT Inhaler Inhale 2 puffs into the lungs every 6 hours as needed for shortness of breath / dyspnea or wheezing     desogestrel-ethinyl estradiol (APRI) 0.15-30 MG-MCG tablet Take 1 tablet by mouth daily Active pills only for 3 packs, then take placebos on 4th pack.     diphenhydrAMINE (BENADRYL) 25 MG capsule Take 25 mg by mouth every 6 hours as needed for itching or allergies     doxepin (SINEQUAN) 10 MG capsule Take 10 mg by mouth At Bedtime     fexofenadine (ALLEGRA ALLERGY) 180 MG tablet Take 180 mg by mouth 2 times daily     Fish Oil-Cholecalciferol (FISH OIL + D3) 1256-3405 MG-UNIT CAPS Take 2 capsules by mouth daily     montelukast (SINGULAIR) 10 MG tablet Take 10 mg by mouth At Bedtime     Multiple Vitamins-Minerals (MULTIVITAMIN ADULT) TABS Take 1 tablet by mouth daily     norgestimate-ethinyl estradiol (ORTHO-CYCLEN, SPRINTEC) 0.25-35 MG-MCG per tablet Take 1  tablet by mouth daily     RaNITidine HCl (ZANTAC PO) Take 150 mg by mouth 2 times daily      No current facility-administered medications for this visit.      Allergies   Allergen Reactions     Seasonal Allergies      Latex Rash       ROS:  12 point review of systems negative other than symptoms noted below.  Genitourinary: Spotting    OBJECTIVE:     /82   Wt 86.6 kg (191 lb)   LMP 04/28/2019   BMI 31.78 kg/m    Body mass index is 31.78 kg/m .    Exam:  Constitutional:  Appearance: Well nourished, well developed alert, in no acute distress  Chest:  Respiratory Effort:  Breathing unlabored  Neurologic/Psychiatric:  Mental Status:  Oriented X3        ASSESSMENT/PLAN:                                                        ICD-10-CM    1. Endometriosis N80.9 desogestrel-ethinyl estradiol (APRI) 0.15-30 MG-MCG tablet     DISCONTINUED: desogestrel-ethinyl estradiol (APRI) 0.15-30 MG-MCG tablet   2. Breakthrough bleeding on OCPs N92.1          -Reviewed nature of endometriosis, symptoms and treatments available. Discussed changing hormonal method. 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 Is motivated to try to get the OCPs to work first.  Improved pain on OCP since last visit. Will have her finish her current pill pack as is not haivng BTB right now. Then change to Apri. Will trial extended dosing with menses q 4mo. If can tolerate, may have period every other month, whichever suits pt,  If more BTB, call back and can try taper to stop bleeding. Ultimately, may need to change from pills to something else. Call if questions or concerns. Pt happy with plan.     (25 minutes was spent face to face with the patient today discussing her history, diagnosis, and follow-up plan as noted above. Over 50% of the visit was spent in counseling and coordination of care.)        Donna Hou Masters,   [facility]

## 2019-05-09 ENCOUNTER — OFFICE VISIT (OUTPATIENT)
Dept: OBGYN | Facility: CLINIC | Age: 44
End: 2019-05-09
Payer: COMMERCIAL

## 2019-05-09 VITALS — WEIGHT: 191 LBS | DIASTOLIC BLOOD PRESSURE: 82 MMHG | SYSTOLIC BLOOD PRESSURE: 108 MMHG | BODY MASS INDEX: 31.78 KG/M2

## 2019-05-09 DIAGNOSIS — N92.1 BREAKTHROUGH BLEEDING ON OCPS: ICD-10-CM

## 2019-05-09 DIAGNOSIS — N80.9 ENDOMETRIOSIS: Primary | ICD-10-CM

## 2019-05-09 PROCEDURE — 99214 OFFICE O/P EST MOD 30 MIN: CPT | Performed by: OBSTETRICS & GYNECOLOGY

## 2019-05-09 RX ORDER — DESOGESTREL AND ETHINYL ESTRADIOL 0.15-0.03
1 KIT ORAL DAILY
Qty: 112 TABLET | Refills: 6 | Status: SHIPPED | OUTPATIENT
Start: 2019-05-09 | End: 2019-05-13

## 2019-05-13 ENCOUNTER — TELEPHONE (OUTPATIENT)
Dept: OBGYN | Facility: CLINIC | Age: 44
End: 2019-05-13

## 2019-05-13 RX ORDER — DESOGESTREL AND ETHINYL ESTRADIOL 0.15-0.03
1 KIT ORAL DAILY
Qty: 112 TABLET | Refills: 6 | Status: SHIPPED | OUTPATIENT
Start: 2019-05-13 | End: 2020-07-01

## 2019-05-13 NOTE — TELEPHONE ENCOUNTER
Fax received from [facility]: Please clarify directions on RX dated 5/9/19 for desogestrel-ethinyl estradiol (APRI) 0.15-30 MG-MCG tablet. Is the Patient to take continuously without placebos?  Send new RX with updated directions.

## 2019-06-10 PROBLEM — M54.42 ACUTE LEFT-SIDED LOW BACK PAIN WITH LEFT-SIDED SCIATICA: Status: RESOLVED | Noted: 2018-05-31 | Resolved: 2019-06-10

## 2019-06-24 ENCOUNTER — MYC MEDICAL ADVICE (OUTPATIENT)
Dept: OBGYN | Facility: CLINIC | Age: 44
End: 2019-06-24

## 2019-06-24 DIAGNOSIS — N92.1 BREAKTHROUGH BLEEDING ON OCPS: Primary | ICD-10-CM

## 2019-06-25 RX ORDER — DESOGESTREL AND ETHINYL ESTRADIOL 0.15-0.03
1 KIT ORAL DAILY
Qty: 28 TABLET | Refills: 0 | Status: SHIPPED | OUTPATIENT
Start: 2019-06-25 | End: 2019-06-28

## 2019-06-28 RX ORDER — DESOGESTREL AND ETHINYL ESTRADIOL 0.15-0.03
1 KIT ORAL DAILY
Qty: 28 TABLET | Refills: 0 | Status: SHIPPED | OUTPATIENT
Start: 2019-06-28 | End: 2019-08-09

## 2019-08-05 NOTE — PROGRESS NOTES
SUBJECTIVE:                                                   May Aldana is a 44 year old female who presents to clinic today for the following health issue(s):  Patient presents with:  Follow Up: here to follow up endometriosis. Still has some BTB.      HPI:  Can make it through one pack and then will have BTB. Usually going 4pack and then period. This last time the bleeding lasted 8 d.   Like apri better than the sprintec. Feels they have even gotten better than when on sprintec.   Periods are no longer miserable.     Patient's last menstrual period was 2019..   Patient is sexually active, .  Using oral contraceptives and condoms for contraception.    reports that she has never smoked. She has never used smokeless tobacco.    STD testing offered?  Declined    Health maintenance updated:  yes    Today's PHQ-2 Score:   PHQ-2 (  [employer]) 2019   Q1: Little interest or pleasure in doing things 0   Q2: Feeling down, depressed or hopeless 0   PHQ-2 Score 0     Today's PHQ-9 Score:   PHQ-9 SCORE 2018   PHQ-9 Total Score 0     Today's JOSE-7 Score:   JOSE-7 SCORE 2018   Total Score 0       Problem list and histories reviewed & adjusted, as indicated.  Additional history: as documented.    Patient Active Problem List   Diagnosis     CARDIOVASCULAR SCREENING; LDL GOAL LESS THAN 160     Generalized pruritus     Allergic state     Past Surgical History:   Procedure Laterality Date     TONSILLECTOMY & ADENOIDECTOMY        Social History     Tobacco Use     Smoking status: Never Smoker     Smokeless tobacco: Never Used   Substance Use Topics     Alcohol use: Yes     Comment: 1-2 drinks per week       Problem (# of Occurrences) Relation (Name,Age of Onset)    Anesthesia Reaction (1) Sister    Chronic Obstructive Pulmonary Disease (1) Mother    Clotting Disorder (3) Mother, Maternal Grandmother, Sister    Colon Polyps (1) Mother    Coronary Artery Disease (1) Maternal Grandmother     Diabetes (1) Maternal Grandmother    Genetic Disorder (1) Mother    Heart Failure (2) Maternal Grandmother, Paternal Grandfather    Hypertension (2) Mother, Maternal Grandmother    Leukemia (1) Maternal Grandfather    Low Back Problems (1) Sister    Lupus (1) Mother    Migraines (2) Mother, Sister    Other - See Comments (3) Mother: circulation problems, ovarian tumor, Mast Cell Activation Disorder, common variable immuno-deficiency, Maternal Grandmother: circulation problems, Sister: immune disorder,     Other Cancer (1) Maternal Grandfather    Seasonal/Environmental Allergies (2) Mother, Sister    Seizure Disorder (2) Mother, Sister    Substance Abuse (1) Father    Thyroid Disease (1) Sister            Current Outpatient Medications   Medication Sig     albuterol (PROAIR HFA/PROVENTIL HFA/VENTOLIN HFA) 108 (90 Base) MCG/ACT Inhaler Inhale 2 puffs into the lungs every 6 hours as needed for shortness of breath / dyspnea or wheezing     desogestrel-ethinyl estradiol (APRI) 0.15-30 MG-MCG tablet Take 1 tablet by mouth daily Active pills only for 3 packs, then take placebos on 4th pack.     diphenhydrAMINE (BENADRYL) 25 MG capsule Take 25 mg by mouth every 6 hours as needed for itching or allergies     doxepin (SINEQUAN) 10 MG capsule Take 10 mg by mouth At Bedtime     fexofenadine (ALLEGRA ALLERGY) 180 MG tablet Take 180 mg by mouth 2 times daily     Fish Oil-Cholecalciferol (FISH OIL + D3) 7611-7070 MG-UNIT CAPS Take 2 capsules by mouth daily     montelukast (SINGULAIR) 10 MG tablet Take 10 mg by mouth At Bedtime     Multiple Vitamins-Minerals (MULTIVITAMIN ADULT) TABS Take 1 tablet by mouth daily     RaNITidine HCl (ZANTAC PO) Take 150 mg by mouth 2 times daily      No current facility-administered medications for this visit.      Allergies   Allergen Reactions     Seasonal Allergies      Latex Rash       ROS:  12 point review of systems negative other than symptoms noted below.    OBJECTIVE:     /68   Pulse  "72   Ht 1.651 m (5' 5\")   Wt 86.2 kg (190 lb)   LMP 07/22/2019   BMI 31.62 kg/m    Body mass index is 31.62 kg/m .    Exam:  Constitutional:  Appearance: Well nourished, well developed alert, in no acute distress  Chest:  Respiratory Effort:  Breathing unlabored  Gastrointestinal:  Abdominal Examination:  Abdomen nontender to palpation, tone normal without rigidity or guarding, no masses present, umbilicus without lesions; Liver/Spleen:  No hepatomegaly present, liver nontender to palpation; Hernias:  No hernias present  Skin:General Inspection:  No rashes present, no lesions present, no areas of discoloration; Genitalia and Groin:  No rashes present, no lesions present, no areas of discoloration, no masses present.  Neurologic/Psychiatric:  Mental Status:  Oriented X3      In-Clinic Test Results:  No results found for this or any previous visit (from the past 24 hour(s)).    ASSESSMENT/PLAN:                                                      No diagnosis found.    There are no Patient Instructions on file for this visit.    -Doing well with apri, though with some BTB. Is accepting of this, not overly bothered. Has had overall improvement in pelvic pain and period pain. Will try to have period after 2-3 packs instead of 3-4. Discussed monitoring for increases/resumption of pelvic/period pain as a signal needs to change medication plan. Will f/u at time of annual exam which is due in the fall, though may push to winter. Questions answered.     (15 minutes was spent face to face with the patient today discussing her history, diagnosis, and follow-up plan as noted above. Over 50% of the visit was spent in counseling and coordination of care.)       Donna Hou Masters, DO  [facility] [place]  "

## 2019-08-09 ENCOUNTER — OFFICE VISIT (OUTPATIENT)
Dept: OBGYN | Facility: CLINIC | Age: 44
End: 2019-08-09
Payer: COMMERCIAL

## 2019-08-09 VITALS
SYSTOLIC BLOOD PRESSURE: 108 MMHG | DIASTOLIC BLOOD PRESSURE: 68 MMHG | HEIGHT: 65 IN | WEIGHT: 190 LBS | HEART RATE: 72 BPM | BODY MASS INDEX: 31.65 KG/M2

## 2019-08-09 DIAGNOSIS — N80.9 ENDOMETRIOSIS: Primary | ICD-10-CM

## 2019-08-09 PROCEDURE — 99213 OFFICE O/P EST LOW 20 MIN: CPT | Performed by: OBSTETRICS & GYNECOLOGY

## 2019-08-09 ASSESSMENT — MIFFLIN-ST. JEOR: SCORE: 1522.71

## 2019-11-05 ENCOUNTER — OFFICE VISIT (OUTPATIENT)
Dept: FAMILY MEDICINE | Facility: CLINIC | Age: 44
End: 2019-11-05
Payer: COMMERCIAL

## 2019-11-05 VITALS
HEIGHT: 65 IN | TEMPERATURE: 99.6 F | DIASTOLIC BLOOD PRESSURE: 81 MMHG | OXYGEN SATURATION: 97 % | HEART RATE: 93 BPM | BODY MASS INDEX: 31.99 KG/M2 | SYSTOLIC BLOOD PRESSURE: 150 MMHG | WEIGHT: 192 LBS

## 2019-11-05 DIAGNOSIS — R07.0 THROAT PAIN: Primary | ICD-10-CM

## 2019-11-05 DIAGNOSIS — J06.9 VIRAL URI WITH COUGH: ICD-10-CM

## 2019-11-05 LAB
DEPRECATED S PYO AG THROAT QL EIA: NORMAL
SPECIMEN SOURCE: NORMAL

## 2019-11-05 PROCEDURE — 87081 CULTURE SCREEN ONLY: CPT | Performed by: NURSE PRACTITIONER

## 2019-11-05 PROCEDURE — 99213 OFFICE O/P EST LOW 20 MIN: CPT | Performed by: NURSE PRACTITIONER

## 2019-11-05 PROCEDURE — 87880 STREP A ASSAY W/OPTIC: CPT | Performed by: NURSE PRACTITIONER

## 2019-11-05 RX ORDER — FAMOTIDINE 20 MG/1
20 TABLET, FILM COATED ORAL 2 TIMES DAILY
COMMUNITY
Start: 2019-11-05

## 2019-11-05 RX ORDER — CODEINE PHOSPHATE AND GUAIFENESIN 10; 100 MG/5ML; MG/5ML
1-2 SOLUTION ORAL EVERY 6 HOURS PRN
Qty: 120 ML | Refills: 0 | Status: SHIPPED | OUTPATIENT
Start: 2019-11-05 | End: 2019-12-09

## 2019-11-05 ASSESSMENT — MIFFLIN-ST. JEOR: SCORE: 1531.79

## 2019-11-05 NOTE — PATIENT INSTRUCTIONS
Push fluids  Steam heat  Aleve 1- pills twice a day for comfort and fever  flonase or nasonex to help unplug ears  Use your albuterol 2 puffs every 6 hours  Take plain mucinex 1200mg twice    And use the mucinex /robitussin with codeine for nighttime cough to help with sleep

## 2019-11-05 NOTE — PROGRESS NOTES
Subjective     May Aldana is a 44 year old female who presents to clinic today for the following health issues:    HPI   RESPIRATORY SYMPTOMS      Duration: 1 week     Description    sore throat, cough, fever, ear pain bilateral, fatigue/malaise and nausea body aches and vomiting last week but not for 6 days     Severity: severe    Accompanying signs and symptoms: None    History (predisposing factors):  none    Precipitating or alleviating factors: None    Therapies tried and outcome:  rest and fluids acetaminophen, used albuterol a couple of times with benefit, benadryl      Patient Active Problem List   Diagnosis     CARDIOVASCULAR SCREENING; LDL GOAL LESS THAN 160     Generalized pruritus     Allergic state     Past Surgical History:   Procedure Laterality Date     TONSILLECTOMY & ADENOIDECTOMY  1987       Social History     Tobacco Use     Smoking status: Never Smoker     Smokeless tobacco: Never Used   Substance Use Topics     Alcohol use: Yes     Comment: 1-2 drinks per week      Family History   Problem Relation Age of Onset     Hypertension Mother      Colon Polyps Mother      Genetic Disorder Mother      Lupus Mother      Chronic Obstructive Pulmonary Disease Mother      Clotting Disorder Mother      Seasonal/Environmental Allergies Mother      Other - See Comments Mother         circulation problems, ovarian tumor, Mast Cell Activation Disorder, common variable immuno-deficiency     Seizure Disorder Mother      Migraines Mother      Substance Abuse Father      Heart Failure Maternal Grandmother      Clotting Disorder Maternal Grandmother      Coronary Artery Disease Maternal Grandmother      Diabetes Maternal Grandmother      Hypertension Maternal Grandmother      Other - See Comments Maternal Grandmother         circulation problems     Other Cancer Maternal Grandfather      Leukemia Maternal Grandfather      Heart Failure Paternal Grandfather      Anesthesia Reaction Sister      Thyroid  "Disease Sister      Other - See Comments Sister         immune disorder,      Seasonal/Environmental Allergies Sister      Clotting Disorder Sister      Migraines Sister      Seizure Disorder Sister      Low Back Problems Sister          Current Outpatient Medications   Medication Sig Dispense Refill     albuterol (PROAIR HFA/PROVENTIL HFA/VENTOLIN HFA) 108 (90 Base) MCG/ACT Inhaler Inhale 2 puffs into the lungs every 6 hours as needed for shortness of breath / dyspnea or wheezing       desogestrel-ethinyl estradiol (APRI) 0.15-30 MG-MCG tablet Take 1 tablet by mouth daily Active pills only for 3 packs, then take placebos on 4th pack. 112 tablet 6     diphenhydrAMINE (BENADRYL) 25 MG capsule Take 25 mg by mouth every 6 hours as needed for itching or allergies       doxepin (SINEQUAN) 10 MG capsule Take 10 mg by mouth At Bedtime       famotidine (PEPCID) 20 MG tablet Take 1 tablet (20 mg) by mouth 2 times daily       fexofenadine (ALLEGRA ALLERGY) 180 MG tablet Take 180 mg by mouth 2 times daily       Fish Oil-Cholecalciferol (FISH OIL + D3) 0397-0552 MG-UNIT CAPS Take 2 capsules by mouth daily       montelukast (SINGULAIR) 10 MG tablet Take 10 mg by mouth At Bedtime       Multiple Vitamins-Minerals (MULTIVITAMIN ADULT) TABS Take 1 tablet by mouth daily       Allergies   Allergen Reactions     Seasonal Allergies      Latex Rash   Reviewed and updated as needed this visit by Provider         Review of Systems   ROS COMP: Constitutional, HEENT, cardiovascular, pulmonary, gi and gu systems are negative, except as otherwise noted.      Objective      BP (!) 150/81 (BP Location: Right arm, Patient Position: Chair, Cuff Size: Adult Large)   Pulse 93   Temp 99.6  F (37.6  C) (Tympanic)   Ht 1.651 m (5' 5\")   Wt 87.1 kg (192 lb)   SpO2 97%   BMI 31.95 kg/m    Body mass index is 31.95 kg/m .     Physical Exam   GENERAL: healthy, alert and no distress  EYES: Eyes grossly normal to inspection, PERRL and conjunctivae and " sclerae normal  HENT: ear canals and TM's normal, nose and mouth without ulcers or lesions  NECK: no adenopathy, no asymmetry, masses, or scars and thyroid normal to palpation  RESP: lungs clear to auscultation - no rales, rhonchi or wheezes  CV: regular rate and rhythm, normal S1 S2, no S3 or S4, no murmur, click or rub, no peripheral edema and peripheral pulses strong    Diagnostic Test Results:  Labs reviewed in Epic        Assessment & Plan       ICD-10-CM    1. Throat pain R07.0 Strep, Rapid Screen     Beta strep group A culture   2. Viral URI with cough J06.9 guaiFENesin-codeine (ROBITUSSIN AC) 100-10 MG/5ML solution    B97.89    I suspect that she may have the flu but is outside recommended time for antiviral treatment    Patient Instructions   Push fluids  Steam heat  Aleve 1- pills twice a day for comfort and fever  flonase or nasonex to help unplug ears  Use your albuterol 2 puffs every 6 hours  Take plain mucinex 1200mg twice    And use the mucinex /robitussin with codeine for nighttime cough to help with sleep    She will remain off work for the next 6 days     [facility]INDY [facility]

## 2019-11-06 LAB
BACTERIA SPEC CULT: NORMAL
SPECIMEN SOURCE: NORMAL

## 2019-12-09 ENCOUNTER — OFFICE VISIT (OUTPATIENT)
Dept: FAMILY MEDICINE | Facility: CLINIC | Age: 44
End: 2019-12-09
Payer: COMMERCIAL

## 2019-12-09 ENCOUNTER — E-VISIT (OUTPATIENT)
Dept: FAMILY MEDICINE | Facility: CLINIC | Age: 44
End: 2019-12-09

## 2019-12-09 VITALS
OXYGEN SATURATION: 97 % | TEMPERATURE: 100 F | WEIGHT: 192 LBS | BODY MASS INDEX: 31.95 KG/M2 | SYSTOLIC BLOOD PRESSURE: 148 MMHG | HEART RATE: 102 BPM | DIASTOLIC BLOOD PRESSURE: 87 MMHG

## 2019-12-09 DIAGNOSIS — Z53.9 ERRONEOUS ENCOUNTER--DISREGARD: Primary | ICD-10-CM

## 2019-12-09 DIAGNOSIS — R05.9 COUGH: ICD-10-CM

## 2019-12-09 DIAGNOSIS — J06.9 UPPER RESPIRATORY TRACT INFECTION, UNSPECIFIED TYPE: Primary | ICD-10-CM

## 2019-12-09 DIAGNOSIS — J30.2 SEASONAL ALLERGIC RHINITIS, UNSPECIFIED TRIGGER: ICD-10-CM

## 2019-12-09 DIAGNOSIS — J06.9 VIRAL URI WITH COUGH: ICD-10-CM

## 2019-12-09 PROCEDURE — 99213 OFFICE O/P EST LOW 20 MIN: CPT | Performed by: INTERNAL MEDICINE

## 2019-12-09 RX ORDER — PREDNISONE 20 MG/1
20 TABLET ORAL 2 TIMES DAILY
Qty: 20 TABLET | Refills: 0 | Status: SHIPPED | OUTPATIENT
Start: 2019-12-09 | End: 2020-08-14

## 2019-12-09 RX ORDER — AZITHROMYCIN 250 MG/1
TABLET, FILM COATED ORAL
Qty: 6 TABLET | Refills: 0 | Status: SHIPPED | OUTPATIENT
Start: 2019-12-09 | End: 2019-12-09

## 2019-12-09 RX ORDER — AZITHROMYCIN 250 MG/1
TABLET, FILM COATED ORAL
Qty: 6 TABLET | Refills: 0 | Status: SHIPPED | OUTPATIENT
Start: 2019-12-09 | End: 2019-12-14

## 2019-12-09 RX ORDER — PREDNISONE 20 MG/1
20 TABLET ORAL 2 TIMES DAILY
Qty: 20 TABLET | Refills: 0 | Status: SHIPPED | OUTPATIENT
Start: 2019-12-09 | End: 2019-12-09

## 2019-12-09 RX ORDER — CODEINE PHOSPHATE AND GUAIFENESIN 10; 100 MG/5ML; MG/5ML
1-2 SOLUTION ORAL EVERY 6 HOURS PRN
Qty: 120 ML | Refills: 0 | Status: SHIPPED | OUTPATIENT
Start: 2019-12-09 | End: 2020-08-14

## 2019-12-09 NOTE — PROGRESS NOTES
Chief Complaint:       May Aldana is a 44 year old female who presents to clinic today for the following health issues:      RESPIRATORY SYMPTOMS      Duration: 10 days    Description  nasal congestion, rhinorrhea, facial pain/pressure, cough, wheezing, fatigue/malaise and myalgias, headache, low grade fever    Severity: moderate    Accompanying signs and symptoms: very winded when walking    History (predisposing factors):  Just got over influenza    Precipitating or alleviating factors: None    Therapies tried and outcome:  rest and fluids oral decongestant antihistamine OTC NSAID guaifenesin        Current Medications:     Current Outpatient Medications   Medication Sig Dispense Refill     albuterol (PROAIR HFA/PROVENTIL HFA/VENTOLIN HFA) 108 (90 Base) MCG/ACT Inhaler Inhale 2 puffs into the lungs every 6 hours as needed for shortness of breath / dyspnea or wheezing       azithromycin (ZITHROMAX) 250 MG tablet Take 2 tablets (500 mg) by mouth daily for 1 day, THEN 1 tablet (250 mg) daily for 4 days. 6 tablet 0     desogestrel-ethinyl estradiol (APRI) 0.15-30 MG-MCG tablet Take 1 tablet by mouth daily Active pills only for 3 packs, then take placebos on 4th pack. 112 tablet 6     diphenhydrAMINE (BENADRYL) 25 MG capsule Take 25 mg by mouth every 6 hours as needed for itching or allergies       doxepin (SINEQUAN) 10 MG capsule Take 10 mg by mouth At Bedtime       famotidine (PEPCID) 20 MG tablet Take 1 tablet (20 mg) by mouth 2 times daily       fexofenadine (ALLEGRA ALLERGY) 180 MG tablet Take 180 mg by mouth 2 times daily       Fish Oil-Cholecalciferol (FISH OIL + D3) 5275-1868 MG-UNIT CAPS Take 2 capsules by mouth daily       guaiFENesin-codeine (ROBITUSSIN AC) 100-10 MG/5ML solution Take 5-10 mLs by mouth every 6 hours as needed for cough 120 mL 0     montelukast (SINGULAIR) 10 MG tablet Take 10 mg by mouth At Bedtime       Multiple Vitamins-Minerals (MULTIVITAMIN ADULT) TABS Take 1 tablet by mouth  daily       predniSONE (DELTASONE) 20 MG tablet Take 1 tablet (20 mg) by mouth 2 times daily 20 tablet 0         Allergies:      Allergies   Allergen Reactions     Seasonal Allergies      Latex Rash            Past Medical History:     Past Medical History:   Diagnosis Date     Immunodeficiency (H)     Histamine disorder     Kidney infection      Low back pain          Past Surgical History:     Past Surgical History:   Procedure Laterality Date     TONSILLECTOMY & ADENOIDECTOMY  1987         Family Medical History:     Family History   Problem Relation Age of Onset     Hypertension Mother      Colon Polyps Mother      Genetic Disorder Mother      Lupus Mother      Chronic Obstructive Pulmonary Disease Mother      Clotting Disorder Mother      Seasonal/Environmental Allergies Mother      Other - See Comments Mother         circulation problems, ovarian tumor, Mast Cell Activation Disorder, common variable immuno-deficiency     Seizure Disorder Mother      Migraines Mother      Substance Abuse Father      Heart Failure Maternal Grandmother      Clotting Disorder Maternal Grandmother      Coronary Artery Disease Maternal Grandmother      Diabetes Maternal Grandmother      Hypertension Maternal Grandmother      Other - See Comments Maternal Grandmother         circulation problems     Other Cancer Maternal Grandfather      Leukemia Maternal Grandfather      Heart Failure Paternal Grandfather      Anesthesia Reaction Sister      Thyroid Disease Sister      Other - See Comments Sister         immune disorder,      Seasonal/Environmental Allergies Sister      Clotting Disorder Sister      Migraines Sister      Seizure Disorder Sister      Low Back Problems Sister          Social History:     Social History     Socioeconomic History     Marital status:      Spouse name: Not on file     Number of children: Not on file     Years of education: Not on file     Highest education level: Not on file   Occupational History      Not on file   Social Needs     Financial resource strain: Not on file     Food insecurity:     Worry: Not on file     Inability: Not on file     Transportation needs:     Medical: Not on file     Non-medical: Not on file   Tobacco Use     Smoking status: Never Smoker     Smokeless tobacco: Never Used   Substance and Sexual Activity     Alcohol use: Yes     Comment: 1-2 drinks per week      Drug use: No     Sexual activity: Yes     Partners: Male     Birth control/protection: Condom, Pill   Lifestyle     Physical activity:     Days per week: Not on file     Minutes per session: Not on file     Stress: Not on file   Relationships     Social connections:     Talks on phone: Not on file     Gets together: Not on file     Attends Yarsani service: Not on file     Active member of club or organization: Not on file     Attends meetings of clubs or organizations: Not on file     Relationship status: Not on file     Intimate partner violence:     Fear of current or ex partner: Not on file     Emotionally abused: Not on file     Physically abused: Not on file     Forced sexual activity: Not on file   Other Topics Concern     Parent/sibling w/ CABG, MI or angioplasty before 65F 55M? No   Social History Narrative     Not on file           Review of System:     Constitutional: Negative for fever or chills  Skin: Negative for rashes  Ears/Nose/Throat: positive for nasal congestion, sore throat  Respiratory: positive for cough  Cardiovascular: Negative for chest pain  Gastrointestinal: Negative for nausea, vomiting  Genitourinary: Negative for dysuria, hematuria  Musculoskeletal: Negative for myalgias  Neurologic: Negative for headaches  Psychiatric: Negative for depression, anxiety  Hematologic/Lymphatic/Immunologic: Negative  Endocrine: Negative  Behavioral: Negative for tobacco use       Physical Exam:   BP (!) 148/87 (BP Location: Right arm, Cuff Size: Adult Regular)   Pulse 102   Temp 100  F (37.8  C) (Tympanic)   Wt  87.1 kg (192 lb)   SpO2 97%   BMI 31.95 kg/m      GENERAL: alert and no distress  EYES: eyes grossly normal to inspection, and conjunctivae and sclerae normal  HENT: Normocephalic atraumatic. Nose and mouth without ulcers or lesions, nasal congestion present  NECK: supple  RESP: intermittent dry coughing spells present  CV: regular rate and rhythm, normal S1 S2  LYMPH: no peripheral edema   ABDOMEN: nondistended  MS: no gross musculoskeletal defects noted  SKIN: no suspicious lesions or rashes  NEURO: Alert & Oriented x 3.   PSYCH: mentation appears normal, affect normal        Diagnostic Test Results:     Diagnostic Test Results:  No results found for this or any previous visit (from the past 24 hour(s)).    ASSESSMENT/PLAN:   (R05) Cough  (J30.2) Seasonal allergic rhinitis, unspecified trigger  (J06.9) Upper respiratory tract infection, unspecified type  (primary encounter diagnosis)  Comment: acute URI symptoms with cough  Plan: azithromycin (ZITHROMAX) 250 MG tablet,         predniSONE (DELTASONE) 20 MG tablet,         guaiFENesin-codeine (ROBITUSSIN AC) 100-10         MG/5ML solution            Follow Up Plan:     Patient is instructed to return to Internal Medicine clinic for follow-up visit in 1 week.        Edna Vasquez MD  Internal Medicine  [facility]

## 2019-12-15 ENCOUNTER — HEALTH MAINTENANCE LETTER (OUTPATIENT)
Age: 44
End: 2019-12-15

## 2020-06-09 ENCOUNTER — VIRTUAL VISIT (OUTPATIENT)
Dept: FAMILY MEDICINE | Facility: OTHER | Age: 45
End: 2020-06-09

## 2020-06-09 NOTE — PROGRESS NOTES
"Date: 2020 09:05:03  Clinician: Donna Allen  Clinician NPI: [id number]  Patient: May Cha  Patient : 1975  Patient Address: [address] [place] [id number]  Patient Phone: [phone]  Visit Protocol: URI  Patient Summary:  May is a 45 year old ( : 1975 ) female who initiated a Visit for COVID-19 (Coronavirus) evaluation and screening. When asked the question \"Please sign me up to receive news, health information and promotions from [employer].\", May responded \"No\".    May states her symptoms started gradually 3-4 days ago. After her symptoms started, they improved and then got worse again.   Her symptoms consist of ageusia, diarrhea, malaise, myalgia, rhinitis, a headache, and chills. May also feels feverish.   Symptom details     Nasal secretions: The color of her mucus is clear.    Temperature: Her current temperature is 99.4 degrees Fahrenheit.     Headache: She states the headache is moderate (4-6 on a 10 point pain scale).      May denies having wheezing, nausea, teeth pain, sore throat, enlarged lymph nodes, anosmia, facial pain or pressure, cough, nasal congestion, vomiting, and ear pain. She also denies having recent facial or sinus surgery in the past 60 days and taking antibiotic medication for the symptoms. She is not experiencing dyspnea.   Precipitating events  She has not recently been exposed to someone with influenza. May has not been in close contact with any high risk individuals.   Pertinent COVID-19 (Coronavirus) information  In the past 14 days, May has not worked in a congregate living setting.   She does not work or volunteer as healthcare worker or a  and does not work or volunteer in a healthcare facility.   May also has not lived in a congregate living setting in the past 14 days. She does not live with a healthcare worker.   May has not had a close contact with a laboratory-confirmed COVID-19 patient within " 14 days of symptom onset.   Pertinent medical history  May had 1 sinus infection within the past year.   May does not get yeast infections when she takes antibiotics.   May does not need a return to work/school note.   Weight: 190 lbs   May does not smoke or use smokeless tobacco.   She denies pregnancy and denies breastfeeding. Her last period was over a month ago.   Weight: 190 lbs    MEDICATIONS: Enskyce oral, Pepcid AC Maximum Strength oral, doxepin oral, Allegra Allergy oral, Singulair oral, ALLERGIES: NKDA  Clinician Response:  Dear May,   Your symptoms show that you may have coronavirus (COVID-19). This illness can cause fever, cough and trouble breathing. Many people get a mild case and get better on their own. Some people can get very sick.  What should I do?  We would like to test you for this virus. This will be a curbside test done outside the clinic.   1. Please call [phone] to schedule your visit. Explain that you were referred by [facility] to have a COVID-19 test. Be ready to share your [facility] visit ID number.  The following will serve as your written order for this COVID Test, ordered by me, for the indication of suspected COVID [Z20.828]: The test will be ordered in [email or web address], our electronic health record, after you are scheduled. It will show as ordered and authorized by Hemres Jackson MD.  Order: COVID-19 (Coronavirus) PCR for SYMPTOMATIC testing from [facility].      2. When it's time for your COVID test:  Stay at least 6 feet away from others. (If someone will drive you to your test, stay in the backseat, as far away from the  as you can.)   Cover your mouth and nose with a mask, tissue or washcloth.  Go straight to the testing site. Don't make any stops on the way there or back.      3.Starting now: Stay home and away from others (self-isolate) until:   You've had no fever---and no medicine that reduces fever---for 3 full days (72 hours). And...   Your other symptoms have gotten  "better. For example, your cough or breathing has improved. And...   At least 10 days have passed since your symptoms started.       During this time, don't leave the house except for testing or medical care.   Stay in your own room, even for meals. Use your own bathroom if you can.   Stay away from others in your home. No hugging, kissing or shaking hands. No visitors.  Don't go to work, school or anywhere else.    Clean \"high touch\" surfaces often (doorknobs, counters, handles, etc.). Use a household cleaning spray or wipes. You'll find a full list of  on the EPA website: www.epa.gov/pesticide-registration/list-n-disinfectants-use-against-sars-cov-2.   Cover your mouth and nose with a mask, tissue or washcloth to avoid spreading germs.  Wash your hands and face often. Use soap and water.  Caregivers in these groups are at risk for severe illness due to COVID-19:  o People 65 years and older  o People who live in a nursing home or long-term care facility  o People with chronic disease (lung, heart, cancer, diabetes, kidney, liver, immunologic)  o People who have a weakened immune system, including those who:   Are in cancer treatment  Take medicine that weakens the immune system, such as corticosteroids  Had a bone marrow or organ transplant  Have an immune deficiency  Have poorly controlled HIV or AIDS  Are obese (body mass index of 40 or higher)  Smoke regularly   o Caregivers should wear gloves while washing dishes, handling laundry and cleaning bedrooms and bathrooms.  o Use caution when washing and drying laundry: Don't shake dirty laundry, and use the warmest water setting that you can.  o For more tips, go to www.cdc.gov/coronavirus/2019-ncov/downloads/10Things.pdf.      How can I take care of myself?   Get lots of rest. Drink extra fluids (unless a doctor has told you not to).   Take Tylenol (acetaminophen) for fever or pain. If you have liver or kidney problems, ask your family doctor if it's okay " to take Tylenol.   Adults can take either:    650 mg (two 325 mg pills) every 4 to 6 hours, or...   1,000 mg (two 500 mg pills) every 8 hours as needed.    Note: Don't take more than 3,000 mg in one day. Acetaminophen is found in many medicines (both prescribed and over-the-counter medicines). Read all labels to be sure you don't take too much.   For children, check the Tylenol bottle for the right dose. The dose is based on the child's age or weight.    If you have other health problems (like cancer, heart failure, an organ transplant or severe kidney disease): Call your specialty clinic if you don't feel better in the next 2 days.       Know when to call 911. Emergency warning signs include:    Trouble breathing or shortness of breath Pain or pressure in the chest that doesn't go away Feeling confused like you haven't felt before, or not being able to wake up Bluish-colored lips or face  5.Sign up for [employer]. We know it's scary to hear that you might have COVID-19. We want to track your symptoms to make sure you're okay over the next 2 weeks. Please look for an email from [employer]---this is a free, online program that we'll use to keep in touch. To sign up, follow the link in the email. Learn more at [email or web address].      Where can I get more information?   [facility] -- About COVID-19: [email or web address]   CDC -- What to Do If You're Sick: www.cdc.gov/coronavirus/2019-ncov/about/steps-when-sick.html   CDC -- Ending Home Isolation: www.cdc.gov/coronavirus/2019-ncov/hcp/disposition-in-home-patients.html   CDC -- Caring for Someone: www.cdc.gov/coronavirus/2019-ncov/if-you-are-sick/care-for-someone.html   [facility] -- Interim Guidance for Hospital Discharge to Home: www.health.[facility].[place].us/diseases/coronavirus/hcp/hospdischarge.pdf   [facility] clinical trials (COVID-19 research studies): clinicalaffairs.[facility]/umn-clinical-trials    Below are the COVID-19 hotlines at  the [place] Department of Health ([facility]). Interpreters are available.    For health questions: Call 126-224-7673 or 1-207.911.2010 (7 a.m. to 7 p.m.) For questions about schools and childcare: Call 704-082-5267 or 1-503.705.6657 (7 a.m. to 7 p.m.)    Diagnosis: Myalgia  Diagnosis ICD: M79.1

## 2020-06-10 DIAGNOSIS — Z20.822 SUSPECTED COVID-19 VIRUS INFECTION: Primary | ICD-10-CM

## 2020-06-10 PROCEDURE — 99207 ZZC NO BILLABLE SERVICE THIS VISIT: CPT

## 2020-06-10 PROCEDURE — 99000 SPECIMEN HANDLING OFFICE-LAB: CPT | Performed by: FAMILY MEDICINE

## 2020-06-10 PROCEDURE — U0003 INFECTIOUS AGENT DETECTION BY NUCLEIC ACID (DNA OR RNA); SEVERE ACUTE RESPIRATORY SYNDROME CORONAVIRUS 2 (SARS-COV-2) (CORONAVIRUS DISEASE [COVID-19]), AMPLIFIED PROBE TECHNIQUE, MAKING USE OF HIGH THROUGHPUT TECHNOLOGIES AS DESCRIBED BY CMS-2020-01-R: HCPCS | Mod: 90 | Performed by: FAMILY MEDICINE

## 2020-06-11 LAB
SARS-COV-2 RNA SPEC QL NAA+PROBE: NOT DETECTED
SPECIMEN SOURCE: NORMAL

## 2020-06-30 DIAGNOSIS — N80.9 ENDOMETRIOSIS: ICD-10-CM

## 2020-07-01 RX ORDER — DESOGESTREL AND ETHINYL ESTRADIOL 0.15-0.03
KIT ORAL
Qty: 28 TABLET | Refills: 1 | Status: SHIPPED | OUTPATIENT
Start: 2020-07-01 | End: 2020-09-20

## 2020-07-01 NOTE — TELEPHONE ENCOUNTER
"Requested Prescriptions   Pending Prescriptions Disp Refills     ENSKYCE 0.15-30 MG-MCG tablet [Pharmacy Med Name: DESOGEST/ETH EST(ENSKYCE) TABS 28'S 0.15MG/30MCG] 28 tablet 16     Sig: TAKE 1 TABLET DAILY       Contraceptives Protocol Passed - 6/30/2020  7:05 PM        Passed - Patient is not a current smoker if age is 35 or older        Passed - Recent (12 mo) or future (30 days) visit within the authorizing provider's specialty     Patient has had an office visit with the authorizing provider or a provider within the authorizing providers department within the previous 12 mos or has a future within next 30 days. See \"Patient Info\" tab in inbasket, or \"Choose Columns\" in Meds & Orders section of the refill encounter.              Passed - Medication is active on med list        Passed - No active pregnancy on record        Passed - No positive pregnancy test in past 12 months           Refill sent  Appt due in August  Bernadette Elias RN on 7/1/2020 at 8:21 AM    "

## 2020-08-11 PROCEDURE — 99284 EMERGENCY DEPT VISIT MOD MDM: CPT | Mod: 25

## 2020-08-11 ASSESSMENT — MIFFLIN-ST. JEOR: SCORE: 1510.9

## 2020-08-12 ENCOUNTER — HOSPITAL ENCOUNTER (EMERGENCY)
Facility: CLINIC | Age: 45
Discharge: HOME | End: 2020-08-12
Attending: EMERGENCY MEDICINE | Admitting: EMERGENCY MEDICINE
Payer: COMMERCIAL

## 2020-08-12 ENCOUNTER — APPOINTMENT (OUTPATIENT)
Dept: ULTRASOUND IMAGING | Facility: CLINIC | Age: 45
End: 2020-08-12
Attending: EMERGENCY MEDICINE
Payer: COMMERCIAL

## 2020-08-12 VITALS
WEIGHT: 185 LBS | TEMPERATURE: 98.4 F | HEART RATE: 81 BPM | BODY MASS INDEX: 29.73 KG/M2 | OXYGEN SATURATION: 98 % | RESPIRATION RATE: 16 BRPM | SYSTOLIC BLOOD PRESSURE: 116 MMHG | HEIGHT: 66 IN | DIASTOLIC BLOOD PRESSURE: 94 MMHG

## 2020-08-12 DIAGNOSIS — I80.8 SUPERFICIAL THROMBOPHLEBITIS OF RIGHT UPPER EXTREMITY: ICD-10-CM

## 2020-08-12 PROCEDURE — 93971 EXTREMITY STUDY: CPT | Mod: RT

## 2020-08-12 PROCEDURE — 25000132 ZZH RX MED GY IP 250 OP 250 PS 637: Performed by: EMERGENCY MEDICINE

## 2020-08-12 RX ORDER — CEPHALEXIN 500 MG/1
500 CAPSULE ORAL 4 TIMES DAILY
Qty: 28 CAPSULE | Refills: 0 | Status: SHIPPED | OUTPATIENT
Start: 2020-08-12 | End: 2020-08-19

## 2020-08-12 RX ORDER — CEPHALEXIN 500 MG/1
1000 CAPSULE ORAL ONCE
Status: COMPLETED | OUTPATIENT
Start: 2020-08-12 | End: 2020-08-12

## 2020-08-12 RX ORDER — IBUPROFEN 600 MG/1
600 TABLET, FILM COATED ORAL ONCE
Status: COMPLETED | OUTPATIENT
Start: 2020-08-12 | End: 2020-08-12

## 2020-08-12 RX ADMIN — IBUPROFEN 600 MG: 600 TABLET ORAL at 03:45

## 2020-08-12 RX ADMIN — CEPHALEXIN 1000 MG: 500 CAPSULE ORAL at 03:45

## 2020-08-12 ASSESSMENT — ENCOUNTER SYMPTOMS
NAUSEA: 1
MYALGIAS: 1
COLOR CHANGE: 1
FEVER: 1
ROS SKIN COMMENTS: HEMATOMA

## 2020-08-12 NOTE — ED PROVIDER NOTES
"  History     Chief Complaint:  Arm Pain and Swelling    HPI   May Aldana is a 45 year old female who presents with arm pain and swelling. The patient reports that she had an MRI 6 days ago and had an IV inserted into her right arm with contrast. Over the last few days she noticed that the area around the injection site developed a hematoma. Today, the patient reports that yesterday the arm had a drastic increase in swelling, erythema, radiating achy myalgias, and pain exacerbated by movement of the arm. She also notes that she was at a backyard [facility] when she became nauseated, pale, febrile, and near syncopal. She denies any true fever or syncope and that the hematoma about the puncture point has improved some.       Allergies:  Latex     Medications:    Albuterol inhaler  Maxalt    Past Medical History:    Asthma  Immunodeficiency  Kidney infection     Past Surgical History:    Tonsillectomy and adenoidectomy     Family History:    Hypertension  Colon polyps  Lupus  COPD: Mother  Clotting disorder  Allergies   Seizures  Migraines  Substance abuse   Thyroid disease    Social History:  Negative for tobacco use.  Positive for alcohol use.   Negative for drug use.  Marital Status:       Review of Systems   Constitutional: Positive for fever (subjective).   Gastrointestinal: Positive for nausea.   Musculoskeletal: Positive for myalgias.        Arm swelling   Skin: Positive for color change.        hematoma   Neurological: Positive for syncope (near syncopal).   All other systems reviewed and are negative.    Physical Exam   First Vitals:  Patient Vitals for the past 24 hrs:   BP Temp Temp src Pulse Heart Rate Resp SpO2 Height Weight   08/12/20 0310 (!) 116/94 -- -- 81 -- -- 98 % -- --   08/11/20 2301 138/61 98.4  F (36.9  C) Oral -- 107 16 100 % 1.676 m (5' 6\") 83.9 kg (185 lb)        Physical Exam  Nursing note and vitals reviewed.  Constitutional:  Appears well-developed and well-nourished.   HENT: "   Head:    Atraumatic.   Mouth/Throat:   Oropharynx is clear and moist. No oropharyngeal exudate.   Eyes:    Pupils are equal, round, and reactive to light.   Neck:    Normal range of motion. Neck supple.      No tracheal deviation present. No thyromegaly present.   Cardiovascular:  Normal rate, regular rhythm, no murmur   Pulmonary/Chest: Breath sounds are clear and equal without wheezes or crackles.  Abdominal:   Soft. Bowel sounds are normal. Exhibits no distension and      no mass. There is no tenderness.      There is no rebound and no guarding.   Musculoskeletal:  Exhibits no edema.   Lymphadenopathy:  No cervical adenopathy.   Neurological:   Alert and oriented to person, place, and time.   Skin:    Skin is warm and dry. No rash noted. No pallor.  Right Arm Exam: quarter sized area of purplish brown bruising of the palmar aspect of the proximal forearm with a 6 x 1.5 cm area of palpable cord with mild pink skin discoloration and a tender slightly firm section of the vein. No palpable fluid collection this does not extend to the elbow. No lymphangitis. No axillary lymphadenopathy.    Emergency Department Course     Imaging:  Radiology findings were communicated with the patient who voiced understanding of the findings.    US Upper Extremity Venous Duplex Right  1.  No deep venous thrombosis in the right upper extremity.    2.  Small segment of nonocclusive superficial thrombus involving a branch of the right basilic vein at the area of redness.      Interventions:  0345 Ibuprofen 600 mg PO  0345 Keflex 1 g PO    Emergency Department Course:  0232 The patient was sent for a US while in the emergency department, results above.      0312 Nursing notes and vitals reviewed.    0320 I performed an exam of the patient as documented above.     0343 Findings and plan explained to the Patient. Patient discharged home with instructions regarding supportive care, medications, and reasons to return. The importance of close  follow-up was reviewed.     Impression & Plan     Medical Decision Making:  I found this patient to have superficial thrombophlebitis of her right forearm from an IV start for MRI. There is no sign of DVT on her US and no signs of sepsis so I felt that she could be safely discharged to home with oral antibiotics. She was prescribed Keflex, told to use ibuprofen and warm compresses and then follow up with her doctor in clinic tomorrow to make sure there are no signs of worsening of extension to DVT. She was instructed on signs and symptoms to return for such as worsening pain, swelling, redness, fever, chills, chest pain, or shortness of breath. I felt that she could be safely discharged home.        Diagnosis:    ICD-10-CM    1. Superficial thrombophlebitis of right upper extremity  I80.8        Disposition:  The patient is discharged to home.     Discharge Medications:  Discharge Medication List as of 8/12/2020  3:50 AM      START taking these medications    Details   cephALEXin (KEFLEX) 500 MG capsule Take 1 capsule (500 mg) by mouth 4 times daily for 7 days, Disp-28 capsule,R-0, E-Prescribe             Scribe Disclosure:  I, Rick Hinton, am serving as a scribe at 3:27 AM on 8/12/2020 to document services personally performed by July Collins MD based on my observations and the provider's statements to me.        EMERGENCY DEPARTMENT       July Collins MD  08/12/20 3419

## 2020-08-12 NOTE — ED TRIAGE NOTES
Had an MRI 6 days ago and IV site used for procedure is bruised with increased pain and redness at site with streak of redness. Temp of 99.3 at home.

## 2020-08-12 NOTE — ED AVS SNAPSHOT
Emergency Department  [address] [facility] [address]  Phone:  [phone]  Fax:  [phone]                                    May Aldana   MRN: [id number]    Department:   Emergency Department   Date of Visit:  8/11/2020           After Visit Summary Signature Page    I have received my discharge instructions, and my questions have been answered. I have discussed any challenges I see with this plan with the nurse or doctor.    ..........................................................................................................................................  Patient/Patient Representative Signature      ..........................................................................................................................................  Patient Representative Print Name and Relationship to Patient    ..................................................               ................................................  Date                                   Time    ..........................................................................................................................................  Reviewed by Signature/Title    ...................................................              ..............................................  Date                                               Time          22EPIC Rev 08/18

## 2020-08-14 ENCOUNTER — HOSPITAL ENCOUNTER (OUTPATIENT)
Dept: ULTRASOUND IMAGING | Facility: CLINIC | Age: 45
Discharge: HOME | End: 2020-08-14
Attending: INTERNAL MEDICINE | Admitting: INTERNAL MEDICINE
Payer: COMMERCIAL

## 2020-08-14 ENCOUNTER — OFFICE VISIT (OUTPATIENT)
Dept: FAMILY MEDICINE | Facility: CLINIC | Age: 45
End: 2020-08-14
Payer: COMMERCIAL

## 2020-08-14 VITALS
OXYGEN SATURATION: 94 % | SYSTOLIC BLOOD PRESSURE: 123 MMHG | TEMPERATURE: 99.7 F | DIASTOLIC BLOOD PRESSURE: 76 MMHG | WEIGHT: 190 LBS | BODY MASS INDEX: 30.53 KG/M2 | HEIGHT: 66 IN | HEART RATE: 92 BPM

## 2020-08-14 DIAGNOSIS — T78.40XS ALLERGIC STATE, SEQUELA: ICD-10-CM

## 2020-08-14 DIAGNOSIS — I80.8 SUPERFICIAL THROMBOPHLEBITIS OF RIGHT UPPER EXTREMITY: Primary | ICD-10-CM

## 2020-08-14 DIAGNOSIS — Z86.72 PERSONAL HISTORY OF THROMBOPHLEBITIS: ICD-10-CM

## 2020-08-14 DIAGNOSIS — F51.01 PRIMARY INSOMNIA: ICD-10-CM

## 2020-08-14 DIAGNOSIS — M79.631 PAIN OF RIGHT FOREARM: ICD-10-CM

## 2020-08-14 DIAGNOSIS — R22.31 LOCALIZED SWELLING OF RIGHT FOREARM: ICD-10-CM

## 2020-08-14 DIAGNOSIS — I80.8 SUPERFICIAL THROMBOPHLEBITIS OF RIGHT UPPER EXTREMITY: ICD-10-CM

## 2020-08-14 PROCEDURE — 93971 EXTREMITY STUDY: CPT | Mod: RT

## 2020-08-14 PROCEDURE — 99214 OFFICE O/P EST MOD 30 MIN: CPT | Performed by: INTERNAL MEDICINE

## 2020-08-14 RX ORDER — RIZATRIPTAN BENZOATE 10 MG/1
TABLET ORAL
COMMUNITY
Start: 2020-08-03 | End: 2021-12-16

## 2020-08-14 RX ORDER — PREDNISONE 20 MG/1
20 TABLET ORAL 2 TIMES DAILY
Qty: 6 TABLET | Refills: 2 | Status: SHIPPED | OUTPATIENT
Start: 2020-08-14 | End: 2020-08-17

## 2020-08-14 RX ORDER — CEPHALEXIN 500 MG/1
500 CAPSULE ORAL
Qty: 21 CAPSULE | Refills: 1 | Status: SHIPPED | OUTPATIENT
Start: 2020-08-14 | End: 2020-10-26

## 2020-08-14 ASSESSMENT — MIFFLIN-ST. JEOR: SCORE: 1533.58

## 2020-08-14 NOTE — LETTER
August 14, 2020      May Aldana  [address] [place] [id number]        To Whom It May Concern:    May Aldana was seen in our clinic. Please excuse patient May from work due to illness from 8/12/2020 until 8/23/2020. She may return to work without restrictions on 8/24/2020.          Sincerely,        Edna Vasquez MD

## 2020-08-14 NOTE — PROGRESS NOTES
Subjective     May Aldana is a 45 year old female who presents to clinic today for the following health issues:    HPI       ED/UC Followup:    Facility:   ED  Date of visit: 08/12/2020  Reason for visit:     Superficial thrombophlebitis of right upper extremity     Current Status: patient reports that she has not been feeling better since discharge. States that she is having burning pain sensation .       Post ER discharge follow up of right forearm pain, thrombophlebitis    HPI:     Forearm Pain    Duration:     Since: recent IV injury            Specific cause: IV injury for a CT scan with contrast at outside hospital    Description:      Location of pain: right forearm     Character of pain: dull     Pain radiation: none    Intensity: moderate    History:      Pain interferes with job: yes     History of similar pain problems: no     Any previous MRI or US: yes     Therapies tried without relief: none    Alleviating factors:      Improved by: none    Precipitating factors:    Worsened by: none    Accompanying Signs & Symptoms: skin erythema, soft tissue swelling at the site of the recent IV injury             Current Medications:     Current Outpatient Medications   Medication Sig Dispense Refill     albuterol (PROAIR HFA/PROVENTIL HFA/VENTOLIN HFA) 108 (90 Base) MCG/ACT Inhaler Inhale 2 puffs into the lungs every 6 hours as needed for shortness of breath / dyspnea or wheezing       aspirin (ASA) 81 MG EC tablet Take 1 tablet (81 mg) by mouth daily 90 tablet 0     cephALEXin (KEFLEX) 500 MG capsule Take 1 capsule (500 mg) by mouth 3 times daily (with meals) 21 capsule 1     cephALEXin (KEFLEX) 500 MG capsule Take 1 capsule (500 mg) by mouth 4 times daily for 7 days 28 capsule 0     diphenhydrAMINE (BENADRYL) 25 MG capsule Take 25 mg by mouth every 6 hours as needed for itching or allergies       doxepin (SINEQUAN) 10 MG capsule Take 10 mg by mouth At Bedtime       ENSKYCE 0.15-30 MG-MCG tablet TAKE  1 TABLET DAILY 28 tablet 1     famotidine (PEPCID) 20 MG tablet Take 1 tablet (20 mg) by mouth 2 times daily       fexofenadine (ALLEGRA ALLERGY) 180 MG tablet Take 180 mg by mouth 2 times daily       Fish Oil-Cholecalciferol (FISH OIL + D3) 3427-0482 MG-UNIT CAPS Take 2 capsules by mouth daily       montelukast (SINGULAIR) 10 MG tablet Take 10 mg by mouth At Bedtime       Multiple Vitamins-Minerals (MULTIVITAMIN ADULT) TABS Take 1 tablet by mouth daily       predniSONE (DELTASONE) 20 MG tablet Take 1 tablet (20 mg) by mouth 2 times daily for 3 days 6 tablet 2     rizatriptan (MAXALT) 10 MG tablet Take 10 mg at onset of headache. May repeat in 2 hours if unresolved. Do not exceed 30 mg in 24 hours.  No more than 9 days per month           Allergies:      Allergies   Allergen Reactions     Seasonal Allergies      Latex Rash            Past Medical History:     Past Medical History:   Diagnosis Date     Immunodeficiency (H)     Histamine disorder     Kidney infection      Low back pain          Past Surgical History:     Past Surgical History:   Procedure Laterality Date     TONSILLECTOMY & ADENOIDECTOMY  1987         Family Medical History:     Family History   Problem Relation Age of Onset     Hypertension Mother      Colon Polyps Mother      Genetic Disorder Mother      Lupus Mother      Chronic Obstructive Pulmonary Disease Mother      Clotting Disorder Mother      Seasonal/Environmental Allergies Mother      Other - See Comments Mother         circulation problems, ovarian tumor, Mast Cell Activation Disorder, common variable immuno-deficiency     Seizure Disorder Mother      Migraines Mother      Substance Abuse Father      Heart Failure Maternal Grandmother      Clotting Disorder Maternal Grandmother      Coronary Artery Disease Maternal Grandmother      Diabetes Maternal Grandmother      Hypertension Maternal Grandmother      Other - See Comments Maternal Grandmother         circulation problems     Other  Cancer Maternal Grandfather      Leukemia Maternal Grandfather      Heart Failure Paternal Grandfather      Anesthesia Reaction Sister      Thyroid Disease Sister      Other - See Comments Sister         immune disorder,      Seasonal/Environmental Allergies Sister      Clotting Disorder Sister      Migraines Sister      Seizure Disorder Sister      Low Back Problems Sister          Social History:     Social History     Socioeconomic History     Marital status:      Spouse name: Not on file     Number of children: Not on file     Years of education: Not on file     Highest education level: Not on file   Occupational History     Not on file   Social Needs     Financial resource strain: Not on file     Food insecurity     Worry: Not on file     Inability: Not on file     Transportation needs     Medical: Not on file     Non-medical: Not on file   Tobacco Use     Smoking status: Never Smoker     Smokeless tobacco: Never Used   Substance and Sexual Activity     Alcohol use: Yes     Comment: 1-2 drinks per week      Drug use: No     Sexual activity: Yes     Partners: Male     Birth control/protection: Condom, Pill   Lifestyle     Physical activity     Days per week: Not on file     Minutes per session: Not on file     Stress: Not on file   Relationships     Social connections     Talks on phone: Not on file     Gets together: Not on file     Attends [employer] service: Not on file     Active member of club or organization: Not on file     Attends meetings of clubs or organizations: Not on file     Relationship status: Not on file     Intimate partner violence     Fear of current or ex partner: Not on file     Emotionally abused: Not on file     Physically abused: Not on file     Forced sexual activity: Not on file   Other Topics Concern     Parent/sibling w/ CABG, MI or angioplasty before 65F 55M? No   Social History Narrative     Not on file           Review of System:     Constitutional: Negative for fever or  "chills  Skin: Positive for recent skin erythema with soft tissue swelling of the right forearm at the site of IV for a recent CT scan with contrast  Ears/Nose/Throat: Negative for nasal congestion, sore throat  Respiratory: No shortness of breath, dyspnea on exertion, cough, or hemoptysis  Cardiovascular: Negative for chest pain  Gastrointestinal: Negative for nausea, vomiting  Genitourinary: Negative for dysuria, hematuria  Musculoskeletal: positive for pain, skin erythema with soft tissue swelling of the right forearm at the site of IV for a recent CT scan with contrast  Neurologic: Negative for headaches  Psychiatric: Negative for depression, anxiety, positive for insomnia  Hematologic/Lymphatic/Immunologic: Negative  Endocrine: Negative  Behavioral: Negative for tobacco use       Physical Exam:   /76 (BP Location: Left arm, Patient Position: Sitting, Cuff Size: Adult Regular)   Pulse 92   Temp 99.7  F (37.6  C) (Temporal)   Ht 1.676 m (5' 6\")   Wt 86.2 kg (190 lb)   SpO2 94%   BMI 30.67 kg/m      GENERAL:  alert and no distress  EYES: eyes grossly normal to inspection, and conjunctivae and sclerae normal  HENT: Normocephalic atraumatic. Nose and mouth without ulcers or lesions  NECK: supple  RESP: lungs clear to auscultation   CV: regular rate and rhythm, normal S1 S2  LYMPH: no peripheral edema   ABDOMEN: nondistended  MS: right forearm local soft tissue pain and swelling noted at the site of recent IV site for CT scan with contrast.  SKIN: erythema with soft tissue swelling of the right forearm noted at the site of recent IV site for CT scan with contrast  NEURO: Alert & Oriented x 3.   PSYCH: mentation appears normal, affect normal        Diagnostic Test Results:     Diagnostic Test Results:  Labs reviewed in [facility]    US UPPER EXTREMITY VENOUS DUPLEX RIGHT 8/14/2020 9:50 AM     CLINICAL HISTORY: Follow up on RUE thrombophlebitis. Superficial  thrombophlebitis of right upper " extremity.     TECHNIQUE: Venous Duplex ultrasound of the right upper extremity with  (when possible) and without compression, augmentation, and duplex.  Color flow and spectral Doppler with waveform analysis performed.     COMPARISON: None.     FINDINGS: Ultrasound includes evaluation of the internal jugular vein,  innominate vein, subclavian vein, axillary vein, and brachial vein.  The superficial cephalic and basilic veins were also evaluated where  seen.      RIGHT: No deep venous thrombosis. Stable superficial basilic vein  branch venous thrombus.                                                                      IMPRESSION:  1.  No deep venous thrombosis in the right upper extremity.  2.  Stable venous thrombus involving a branch of the right basilic  vein of the proximal forearm.     RISHI OCHOA MD    ASSESSMENT/PLAN:   (T78.40XS) Allergic state, sequela  (R22.31) Localized swelling of right forearm  (M79.631) Pain of right forearm  (I80.8) Superficial thrombophlebitis of right upper extremity  (primary encounter diagnosis)  Comment: post ER discharge follow up of recent right forearm superficial thrombophlebitis due to IV site injury from a recent CT scan with contrast procedure at outside hospital. Pain and swelling symptoms not improved since ER discharge. Also likely secondary to local surround soft tissue site allergic reaction to IV contrast extravasation at the IV site injury of the right forearm.   Plan: US Upper Extremity Venous Duplex Right,         cephALEXin (KEFLEX) 500 MG capsule, predniSONE         (DELTASONE) 20 MG tablet, aspirin (ASA) 81 MG         EC tablet      (F51.01) Primary insomnia  Comment: stable.  Plan: continue Doxepin.        Follow Up Plan:     Patient is instructed to return to Internal Medicine clinic for follow-up visit in 2 weeks.        Edna Vasquez MD  Internal Medicine  [facility]

## 2020-08-27 ENCOUNTER — OFFICE VISIT (OUTPATIENT)
Dept: FAMILY MEDICINE | Facility: CLINIC | Age: 45
End: 2020-08-27
Payer: COMMERCIAL

## 2020-08-27 VITALS
BODY MASS INDEX: 31.02 KG/M2 | HEART RATE: 84 BPM | WEIGHT: 193 LBS | SYSTOLIC BLOOD PRESSURE: 116 MMHG | HEIGHT: 66 IN | DIASTOLIC BLOOD PRESSURE: 69 MMHG | TEMPERATURE: 97.7 F | OXYGEN SATURATION: 98 %

## 2020-08-27 DIAGNOSIS — I80.8 SUPERFICIAL THROMBOPHLEBITIS OF RIGHT UPPER EXTREMITY: Primary | ICD-10-CM

## 2020-08-27 DIAGNOSIS — M79.631 PAIN OF RIGHT FOREARM: ICD-10-CM

## 2020-08-27 DIAGNOSIS — F51.01 PRIMARY INSOMNIA: ICD-10-CM

## 2020-08-27 DIAGNOSIS — T78.40XS ALLERGIC DISORDER, SEQUELA: ICD-10-CM

## 2020-08-27 DIAGNOSIS — R22.31 LOCALIZED SWELLING OF RIGHT FOREARM: ICD-10-CM

## 2020-08-27 PROCEDURE — 99214 OFFICE O/P EST MOD 30 MIN: CPT | Performed by: INTERNAL MEDICINE

## 2020-08-27 ASSESSMENT — MIFFLIN-ST. JEOR: SCORE: 1547.19

## 2020-08-27 NOTE — PROGRESS NOTES
Subjective     May Aldana is a 45 year old female who presents to clinic today for the following health issues:    HPI       Follow up    Subjective     May Aldana is a 45 year old female who presents to clinic today for the following health issues:    HPI       follow up of right forearm pain, thrombophlebitis    HPI:     Forearm Pain    Duration:     Since: recent IV injury            Specific cause: IV injury for a CT scan with contrast at outside hospital    Description:      Location of pain: right forearm     Character of pain: dull     Pain radiation: none    Intensity: moderate    History:      Pain interferes with job: yes     History of similar pain problems: no     Any previous MRI or US: yes     Therapies tried without relief: none    Alleviating factors:      Improved by: none    Precipitating factors:    Worsened by: none    Accompanying Signs & Symptoms: skin erythema, soft tissue swelling at the site of the recent IV injury     Pain and swelling symptoms significantly improved since last clinic visit.         Current Medications:     Current Outpatient Medications   Medication Sig Dispense Refill     albuterol (PROAIR HFA/PROVENTIL HFA/VENTOLIN HFA) 108 (90 Base) MCG/ACT Inhaler Inhale 2 puffs into the lungs every 6 hours as needed for shortness of breath / dyspnea or wheezing       aspirin (ASA) 81 MG EC tablet Take 1 tablet (81 mg) by mouth daily 90 tablet 0     cephALEXin (KEFLEX) 500 MG capsule Take 1 capsule (500 mg) by mouth 3 times daily (with meals) 21 capsule 1     diphenhydrAMINE (BENADRYL) 25 MG capsule Take 25 mg by mouth every 6 hours as needed for itching or allergies       doxepin (SINEQUAN) 10 MG capsule Take 10 mg by mouth At Bedtime       ENSKYCE 0.15-30 MG-MCG tablet TAKE 1 TABLET DAILY 28 tablet 1     famotidine (PEPCID) 20 MG tablet Take 1 tablet (20 mg) by mouth 2 times daily       fexofenadine (ALLEGRA ALLERGY) 180 MG tablet Take 180 mg by mouth 2 times  daily       Fish Oil-Cholecalciferol (FISH OIL + D3) 7918-2983 MG-UNIT CAPS Take 2 capsules by mouth daily       montelukast (SINGULAIR) 10 MG tablet Take 10 mg by mouth At Bedtime       Multiple Vitamins-Minerals (MULTIVITAMIN ADULT) TABS Take 1 tablet by mouth daily       rizatriptan (MAXALT) 10 MG tablet Take 10 mg at onset of headache. May repeat in 2 hours if unresolved. Do not exceed 30 mg in 24 hours.  No more than 9 days per month           Allergies:      Allergies   Allergen Reactions     Seasonal Allergies      Latex Rash            Past Medical History:     Past Medical History:   Diagnosis Date     Immunodeficiency (H)     Histamine disorder     Kidney infection      Low back pain          Past Surgical History:     Past Surgical History:   Procedure Laterality Date     TONSILLECTOMY & ADENOIDECTOMY  1987         Family Medical History:     Family History   Problem Relation Age of Onset     Hypertension Mother      Colon Polyps Mother      Genetic Disorder Mother      Lupus Mother      Chronic Obstructive Pulmonary Disease Mother      Clotting Disorder Mother      Seasonal/Environmental Allergies Mother      Other - See Comments Mother         circulation problems, ovarian tumor, Mast Cell Activation Disorder, common variable immuno-deficiency     Seizure Disorder Mother      Migraines Mother      Substance Abuse Father      Heart Failure Maternal Grandmother      Clotting Disorder Maternal Grandmother      Coronary Artery Disease Maternal Grandmother      Diabetes Maternal Grandmother      Hypertension Maternal Grandmother      Other - See Comments Maternal Grandmother         circulation problems     Other Cancer Maternal Grandfather      Leukemia Maternal Grandfather      Heart Failure Paternal Grandfather      Anesthesia Reaction Sister      Thyroid Disease Sister      Other - See Comments Sister         immune disorder,      Seasonal/Environmental Allergies Sister      Clotting Disorder Sister       Migraines Sister      Seizure Disorder Sister      Low Back Problems Sister          Social History:     Social History     Socioeconomic History     Marital status:      Spouse name: Not on file     Number of children: Not on file     Years of education: Not on file     Highest education level: Not on file   Occupational History     Not on file   Social Needs     Financial resource strain: Not on file     Food insecurity     Worry: Not on file     Inability: Not on file     Transportation needs     Medical: Not on file     Non-medical: Not on file   Tobacco Use     Smoking status: Never Smoker     Smokeless tobacco: Never Used   Substance and Sexual Activity     Alcohol use: Yes     Comment: 1-2 drinks per week      Drug use: No     Sexual activity: Yes     Partners: Male     Birth control/protection: Condom, Pill   Lifestyle     Physical activity     Days per week: Not on file     Minutes per session: Not on file     Stress: Not on file   Relationships     Social connections     Talks on phone: Not on file     Gets together: Not on file     Attends Congregation service: Not on file     Active member of club or organization: Not on file     Attends meetings of clubs or organizations: Not on file     Relationship status: Not on file     Intimate partner violence     Fear of current or ex partner: Not on file     Emotionally abused: Not on file     Physically abused: Not on file     Forced sexual activity: Not on file   Other Topics Concern     Parent/sibling w/ CABG, MI or angioplasty before 65F 55M? No   Social History Narrative     Not on file           Review of System:     Constitutional: Negative for fever or chills  Skin: Positive for recent skin erythema with soft tissue swelling of the right forearm at the site of IV for a recent CT scan with contrast  Ears/Nose/Throat: Negative for nasal congestion, sore throat  Respiratory: No shortness of breath, dyspnea on exertion, cough, or  "hemoptysis  Cardiovascular: Negative for chest pain  Gastrointestinal: Negative for nausea, vomiting  Genitourinary: Negative for dysuria, hematuria  Musculoskeletal: positive for improvement in recent pain, skin erythema with soft tissue swelling of the right forearm at the site of IV for a recent CT scan with contrast  Neurologic: Negative for headaches  Psychiatric: Negative for depression, anxiety, positive for insomnia  Hematologic/Lymphatic/Immunologic: Negative  Endocrine: Negative  Behavioral: Negative for tobacco use       Physical Exam:   /69 (BP Location: Left arm, Patient Position: Sitting, Cuff Size: Adult Large)   Pulse 84   Temp 97.7  F (36.5  C) (Temporal)   Ht 1.676 m (5' 6\")   Wt 87.5 kg (193 lb)   SpO2 98%   BMI 31.15 kg/m      GENERAL:  alert and no distress  EYES: eyes grossly normal to inspection, and conjunctivae and sclerae normal  HENT: Normocephalic atraumatic. Nose and mouth without ulcers or lesions  NECK: supple  RESP: lungs clear to auscultation   CV: regular rate and rhythm, normal S1 S2  LYMPH: no peripheral edema   ABDOMEN: nondistended  MS: right forearm local soft tissue pain and swelling noted at the site of recent IV site for CT scan with contrast.  SKIN: erythema with soft tissue swelling of the right forearm noted at the site of recent IV site for CT scan with contrast  NEURO: Alert & Oriented x 3.   PSYCH: mentation appears normal, affect normal        Diagnostic Test Results:     Diagnostic Test Results:  Labs reviewed in [facility] UPPER EXTREMITY VENOUS DUPLEX RIGHT 8/14/2020 9:50 AM     CLINICAL HISTORY: Follow up on RUE thrombophlebitis. Superficial  thrombophlebitis of right upper extremity.     TECHNIQUE: Venous Duplex ultrasound of the right upper extremity with  (when possible) and without compression, augmentation, and duplex.  Color flow and spectral Doppler with waveform analysis performed.     COMPARISON: None.     FINDINGS: Ultrasound includes " evaluation of the internal jugular vein,  innominate vein, subclavian vein, axillary vein, and brachial vein.  The superficial cephalic and basilic veins were also evaluated where  seen.      RIGHT: No deep venous thrombosis. Stable superficial basilic vein  branch venous thrombus.                                                                      IMPRESSION:  1.  No deep venous thrombosis in the right upper extremity.  2.  Stable venous thrombus involving a branch of the right basilic  vein of the proximal forearm.     RISHI OCHOA MD    ASSESSMENT/PLAN:   (T78.40XS) Allergic state, sequela  (R22.31) Localized swelling of right forearm  (M79.631) Pain of right forearm  (I80.8) Superficial thrombophlebitis of right upper extremity  (primary encounter diagnosis)  Comment: follow up of recent right forearm superficial thrombophlebitis due to IV site injury from a recent CT scan with contrast procedure at outside hospital. Also likely secondary to local surround soft tissue site allergic reaction to IV contrast extravasation at the IV site injury of the right forearm. Pain and swelling symptoms significantly improved since last clinic visit.   Plan: continue to finish current cephALEXin (KEFLEX) 500 MG capsule, predniSONE         (DELTASONE) 20 MG tablet, aspirin (ASA) 81 MG         EC tablet 2 week medication therapy. Ok to discontinue aspirin thereafter.      (F51.01) Primary insomnia  Comment: stable.  Plan: continue Doxepin.        Follow Up Plan:     Patient is instructed to return to Internal Medicine clinic for follow-up visit in 2 weeks.        Edna Vasquez MD  Internal Medicine  [facility]

## 2020-09-19 DIAGNOSIS — N80.9 ENDOMETRIOSIS: ICD-10-CM

## 2020-09-20 RX ORDER — DESOGESTREL AND ETHINYL ESTRADIOL 0.15-0.03
KIT ORAL
Qty: 28 TABLET | Refills: 0 | Status: SHIPPED | OUTPATIENT
Start: 2020-09-20 | End: 2020-10-26

## 2020-09-20 NOTE — TELEPHONE ENCOUNTER
"Requested Prescriptions   Pending Prescriptions Disp Refills     ENSKYCE 0.15-30 MG-MCG tablet [Pharmacy Med Name: DESOGEST/ETH EST(ENSKYCE) TABS 28'S 0.15MG/30MCG] 28 tablet 16     Sig: TAKE 1 TABLET DAILY (APPOINTMENT NEEDED FOR FURTHER REFILLS)       Contraceptives Protocol Failed - 9/19/2020  7:03 PM        Failed - Recent (12 mo) or future (30 days) visit within the authorizing provider's specialty     Patient has had an office visit with the authorizing provider or a provider within the authorizing providers department within the previous 12 mos or has a future within next 30 days. See \"Patient Info\" tab in inbasket, or \"Choose Columns\" in Meds & Orders section of the refill encounter.              Passed - Patient is not a current smoker if age is 35 or older        Passed - Medication is active on med list        Passed - No active pregnancy on record        Passed - No positive pregnancy test in past 12 months           Medication is being filled for 1 time refill only due to:  Appointment needed for further refills  Bernadette Elias RN on 9/20/2020 at 6:48 PM      "

## 2020-10-10 DIAGNOSIS — N80.9 ENDOMETRIOSIS: ICD-10-CM

## 2020-10-11 RX ORDER — DESOGESTREL AND ETHINYL ESTRADIOL 0.15-0.03
KIT ORAL
Qty: 28 TABLET | Refills: 16 | OUTPATIENT
Start: 2020-10-11

## 2020-10-12 NOTE — TELEPHONE ENCOUNTER
"Requested Prescriptions   Pending Prescriptions Disp Refills     ENSKYCE 0.15-30 MG-MCG tablet [Pharmacy Med Name: DESOGEST/ETH EST(ENSKYCE) TABS 28'S 0.15MG/30MCG] 28 tablet 16     Sig: TAKE 1 TABLET DAILY (APPOINTMENT NEEDED FOR FURTHER REFILLS)       Contraceptives Protocol Failed - 10/10/2020  3:33 PM        Failed - Recent (12 mo) or future (30 days) visit within the authorizing provider's specialty     Patient has had an office visit with the authorizing provider or a provider within the authorizing providers department within the previous 12 mos or has a future within next 30 days. See \"Patient Info\" tab in inbasket, or \"Choose Columns\" in Meds & Orders section of the refill encounter.              Passed - Patient is not a current smoker if age is 35 or older        Passed - Medication is active on med list        Passed - No active pregnancy on record        Passed - No positive pregnancy test in past 12 months           Last Written Prescription Date:  9/20/20  Last Fill Quantity: 28,  # refills: 0   Last office visit: 8/9/2019 with prescribing provider:  Rocco   Future Office Visit:      Pt due for annual, no appt scheduled. Pt already received one month extension. Rx denied.   Aydee Franz RN on 10/11/2020 at 7:14 PM        "

## 2020-10-23 NOTE — PROGRESS NOTES
May is a 45 year old  female who presents for annual exam.     Besides routine health maintenance, she has no other health concerns today .    HPI:  The patient's PCP is Edna Vasquez MD.      Very happy with OCPs, would like refill.     Fasting for labs today.     Had her thyroid tested at [place] a year ago, was hyper at first then normal range.   Has family hx thyroid.     Trying to exercise. [employer]. Magnesium for migraines      GYNECOLOGIC HISTORY:    Patient's last menstrual period was 2020.    Regular menses? yes  Menses every 2 month.  Length of menses: 5 days    Her current contraception method is: oral contraceptives.  She  reports that she has never smoked. She has never used smokeless tobacco.    Patient is sexually active.  STD testing offered?  Declined  Last PHQ-9 score on record =   PHQ-9 SCORE 10/26/2020   PHQ-9 Total Score 0     Last GAD7 score on record =   JOSE-7 SCORE 10/26/2020   Total Score 2     Alcohol Score = 1    HEALTH MAINTENANCE:  Cholesterol: (No results found for: CHOL)  Last Mammo: 2018, Result: Normal, Next Mammo: THIS YEAR  Pap: HPV NEGATIVE  Lab Results   Component Value Date    PAP NIL 2018      Colonoscopy:  NA, Next Colonoscopy: AGE 47.  Dexa:  NA    Health maintenance updated:  no, due for mammogram.     HISTORY:  OB History    Para Term  AB Living   0 0 0 0 0 0   SAB TAB Ectopic Multiple Live Births   0 0 0 0 0       Patient Active Problem List   Diagnosis     CARDIOVASCULAR SCREENING; LDL GOAL LESS THAN 160     Generalized pruritus     Allergic state     Past Surgical History:   Procedure Laterality Date     TONSILLECTOMY & ADENOIDECTOMY  1987      Social History     Tobacco Use     Smoking status: Never Smoker     Smokeless tobacco: Never Used   Substance Use Topics     Alcohol use: Yes     Comment: 1-2 drinks per week       Problem (# of Occurrences) Relation (Name,Age of Onset)    Anesthesia Reaction (1) Sister    Chronic Obstructive  Pulmonary Disease (1) Mother    Clotting Disorder (3) Mother, Maternal Grandmother, Sister    Colon Polyps (1) Mother    Coronary Artery Disease (1) Maternal Grandmother    Diabetes (1) Maternal Grandmother    Genetic Disorder (1) Mother    Heart Failure (2) Maternal Grandmother, Paternal Grandfather    Hypertension (2) Mother, Maternal Grandmother    Leukemia (1) Maternal Grandfather    Low Back Problems (1) Sister    Lupus (1) Mother    Migraines (2) Mother, Sister    Other - See Comments (3) Mother: circulation problems, ovarian tumor, Mast Cell Activation Disorder, common variable immuno-deficiency, Maternal Grandmother: circulation problems, Sister: immune disorder,     Other Cancer (1) Maternal Grandfather    Seasonal/Environmental Allergies (2) Mother, Sister    Seizure Disorder (2) Mother, Sister    Substance Abuse (1) Father    Thyroid Disease (1) Sister            Current Outpatient Medications   Medication Sig     albuterol (PROAIR HFA/PROVENTIL HFA/VENTOLIN HFA) 108 (90 Base) MCG/ACT Inhaler Inhale 2 puffs into the lungs every 6 hours as needed for shortness of breath / dyspnea or wheezing     desogestrel-ethinyl estradiol (ENSKYCE) 0.15-30 MG-MCG tablet TAKE 1 TABLET DAILY     diphenhydrAMINE (BENADRYL) 25 MG capsule Take 25 mg by mouth every 6 hours as needed for itching or allergies     doxepin (SINEQUAN) 10 MG capsule Take 10 mg by mouth At Bedtime     famotidine (PEPCID) 20 MG tablet Take 1 tablet (20 mg) by mouth 2 times daily     fexofenadine (ALLEGRA ALLERGY) 180 MG tablet Take 180 mg by mouth 2 times daily     Fish Oil-Cholecalciferol (FISH OIL + D3) 3490-8718 MG-UNIT CAPS Take 2 capsules by mouth daily     montelukast (SINGULAIR) 10 MG tablet Take 10 mg by mouth At Bedtime     Multiple Vitamins-Minerals (MULTIVITAMIN ADULT) TABS Take 1 tablet by mouth daily     rizatriptan (MAXALT) 10 MG tablet Take 10 mg at onset of headache. May repeat in 2 hours if unresolved. Do not exceed 30 mg in 24  "hours.  No more than 9 days per month     No current facility-administered medications for this visit.      Allergies   Allergen Reactions     Seasonal Allergies      Latex Rash       Past medical, surgical, social and family histories were reviewed and updated in EPIC.    ROS:   12 point review of systems negative other than symptoms noted below or in the HPI.      EXAM:  /86 (BP Location: Right arm, Patient Position: Sitting, Cuff Size: Adult Regular)   Pulse 84   Ht 1.664 m (5' 5.5\")   Wt 86.6 kg (191 lb)   LMP 09/19/2020   Breastfeeding No   BMI 31.30 kg/m     BMI: Body mass index is 31.3 kg/m .    PHYSICAL EXAM:  Constitutional:   Appearance: Well nourished, well developed, alert, in no acute distress  Neck:  Lymph Nodes:  No lymphadenopathy present    Thyroid:  Gland size normal, nontender, no nodules or masses present  on palpation  Chest:  Respiratory Effort:  Breathing unlabored  Cardiovascular:    Heart: Auscultation:  Regular rate, normal rhythm, no murmurs present  Breasts: Inspection of Breasts:  No lymphadenopathy present., Palpation of Breasts and Axillae:  No masses present on palpation, no breast tenderness., Axillary Lymph Nodes:  No lymphadenopathy present. and No nodularity, asymmetry or nipple discharge bilaterally.  Gastrointestinal:   Abdominal Examination:  Abdomen nontender to palpation, tone normal without rigidity or guarding, no masses present, umbilicus without lesions   Liver and Spleen:  No hepatomegaly present, liver nontender to palpation    Hernias:  No hernias present  Lymphatic: Lymph Nodes:  No other lymphadenopathy present  Skin:  General Inspection:  No rashes present, no lesions present, no areas of  discoloration  Neurologic:    Mental Status:  Oriented X3.  Normal strength and tone, sensory exam                grossly normal, mentation intact and speech normal.    Psychiatric:   Mentation appears normal and affect normal/bright.         Pelvic Exam:  External " Genitalia:     Normal appearance for age, no discharge present, no tenderness present, no inflammatory lesions present, color normal  Vagina:     Normal vaginal vault without central or paravaginal defects, no discharge present, no inflammatory lesions present, no masses present  Bladder:     Nontender to palpation  Urethra:   Urethral Body:  Urethra palpation normal, urethra structural support normal   Urethral Meatus:  No erythema or lesions present  Cervix:     Appearance healthy, no lesions present, nontender to palpation, no bleeding present  Uterus:     Uterus: firm, normal sized and nontender, anteverted in position.   Adnexa:     No adnexal tenderness present, no adnexal masses present  Perineum:     Perineum within normal limits, no evidence of trauma, no rashes or skin lesions present  Anus:     Anus within normal limits, no hemorrhoids present  Inguinal Lymph Nodes:     No lymphadenopathy present  Pubic Hair:     Normal pubic hair distribution for age  Genitalia and Groin:     No rashes present, no lesions present, no areas of discoloration, no masses present      COUNSELING:   Reviewed preventive health counseling, as reflected in patient instructions       Osteoporosis Prevention/Bone Health    BMI: Body mass index is 31.3 kg/m .  Weight management plan: Discussed healthy diet and exercise guidelines    ASSESSMENT:  45 year old female with satisfactory annual exam.    ICD-10-CM    1. Encounter for gynecological examination without abnormal finding  Z01.419    2. Endometriosis  N80.9 desogestrel-ethinyl estradiol (ENSKYCE) 0.15-30 MG-MCG tablet   3. Encounter for lipid screening for cardiovascular disease  Z13.220 Lipid panel reflex to direct LDL Fasting    Z13.6    4. Screening for metabolic disorder  Z13.228 Comprehensive metabolic panel   5. Family history of thyroid disease  Z83.49 TSH with free T4 reflex   6. Hyperthyroidism  E05.90 TSH with free T4 reflex       PLAN:  -UTD (due cotest 2021) for  cervical cancer screening. Reviewed guidelines-pap q 3yrs until age 30 when co-testing q 5 years.  -Breast self awareness discussed. Due for mammogram.  -Discussed exercise-making plan, strength training. Nutrition encouraged.  -Colonoscopy age 47  -Osteoporosis prevention discussed.  -Reviewed orders for labs  Fhx and personal hx thyroid dz, check TFTs along with fasting labs  -Refill ocp, doing well.   -Return one year for next annual exam          Donna Hou Masters, DO

## 2020-10-26 ENCOUNTER — OFFICE VISIT (OUTPATIENT)
Dept: OBGYN | Facility: CLINIC | Age: 45
End: 2020-10-26
Payer: COMMERCIAL

## 2020-10-26 VITALS
HEART RATE: 84 BPM | DIASTOLIC BLOOD PRESSURE: 86 MMHG | WEIGHT: 191 LBS | BODY MASS INDEX: 30.7 KG/M2 | HEIGHT: 66 IN | SYSTOLIC BLOOD PRESSURE: 112 MMHG

## 2020-10-26 DIAGNOSIS — Z83.49 FAMILY HISTORY OF THYROID DISEASE: ICD-10-CM

## 2020-10-26 DIAGNOSIS — Z01.419 ENCOUNTER FOR GYNECOLOGICAL EXAMINATION WITHOUT ABNORMAL FINDING: Primary | ICD-10-CM

## 2020-10-26 DIAGNOSIS — Z13.228 SCREENING FOR METABOLIC DISORDER: ICD-10-CM

## 2020-10-26 DIAGNOSIS — E05.90 HYPERTHYROIDISM: ICD-10-CM

## 2020-10-26 DIAGNOSIS — Z13.220 ENCOUNTER FOR LIPID SCREENING FOR CARDIOVASCULAR DISEASE: ICD-10-CM

## 2020-10-26 DIAGNOSIS — Z13.6 ENCOUNTER FOR LIPID SCREENING FOR CARDIOVASCULAR DISEASE: ICD-10-CM

## 2020-10-26 DIAGNOSIS — N80.9 ENDOMETRIOSIS: ICD-10-CM

## 2020-10-26 LAB
ALBUMIN SERPL-MCNC: 3.1 G/DL (ref 3.4–5)
ALP SERPL-CCNC: 73 U/L (ref 40–150)
ALT SERPL W P-5'-P-CCNC: 16 U/L (ref 0–50)
ANION GAP SERPL CALCULATED.3IONS-SCNC: 7 MMOL/L (ref 3–14)
AST SERPL W P-5'-P-CCNC: 13 U/L (ref 0–45)
BILIRUB SERPL-MCNC: 0.3 MG/DL (ref 0.2–1.3)
BUN SERPL-MCNC: 11 MG/DL (ref 7–30)
CALCIUM SERPL-MCNC: 8.3 MG/DL (ref 8.5–10.1)
CHLORIDE SERPL-SCNC: 108 MMOL/L (ref 94–109)
CHOLEST SERPL-MCNC: 211 MG/DL
CO2 SERPL-SCNC: 24 MMOL/L (ref 20–32)
CREAT SERPL-MCNC: 0.62 MG/DL (ref 0.52–1.04)
GFR SERPL CREATININE-BSD FRML MDRD: >90 ML/MIN/{1.73_M2}
GLUCOSE SERPL-MCNC: 83 MG/DL (ref 70–99)
HDLC SERPL-MCNC: 72 MG/DL
LDLC SERPL CALC-MCNC: 115 MG/DL
NONHDLC SERPL-MCNC: 139 MG/DL
POTASSIUM SERPL-SCNC: 3.6 MMOL/L (ref 3.4–5.3)
PROT SERPL-MCNC: 6.8 G/DL (ref 6.8–8.8)
SODIUM SERPL-SCNC: 139 MMOL/L (ref 133–144)
TRIGL SERPL-MCNC: 119 MG/DL
TSH SERPL DL<=0.005 MIU/L-ACNC: 0.62 MU/L (ref 0.4–4)

## 2020-10-26 PROCEDURE — 80053 COMPREHEN METABOLIC PANEL: CPT | Performed by: OBSTETRICS & GYNECOLOGY

## 2020-10-26 PROCEDURE — 84443 ASSAY THYROID STIM HORMONE: CPT | Performed by: OBSTETRICS & GYNECOLOGY

## 2020-10-26 PROCEDURE — 80061 LIPID PANEL: CPT | Performed by: OBSTETRICS & GYNECOLOGY

## 2020-10-26 PROCEDURE — 99396 PREV VISIT EST AGE 40-64: CPT | Performed by: OBSTETRICS & GYNECOLOGY

## 2020-10-26 PROCEDURE — 36415 COLL VENOUS BLD VENIPUNCTURE: CPT | Performed by: OBSTETRICS & GYNECOLOGY

## 2020-10-26 RX ORDER — DESOGESTREL AND ETHINYL ESTRADIOL 0.15-0.03
KIT ORAL
Qty: 84 TABLET | Refills: 4 | Status: SHIPPED | OUTPATIENT
Start: 2020-10-26 | End: 2021-11-22

## 2020-10-26 ASSESSMENT — ANXIETY QUESTIONNAIRES
2. NOT BEING ABLE TO STOP OR CONTROL WORRYING: NOT AT ALL
6. BECOMING EASILY ANNOYED OR IRRITABLE: SEVERAL DAYS
7. FEELING AFRAID AS IF SOMETHING AWFUL MIGHT HAPPEN: NOT AT ALL
3. WORRYING TOO MUCH ABOUT DIFFERENT THINGS: NOT AT ALL
GAD7 TOTAL SCORE: 2
5. BEING SO RESTLESS THAT IT IS HARD TO SIT STILL: NOT AT ALL
1. FEELING NERVOUS, ANXIOUS, OR ON EDGE: SEVERAL DAYS
IF YOU CHECKED OFF ANY PROBLEMS ON THIS QUESTIONNAIRE, HOW DIFFICULT HAVE THESE PROBLEMS MADE IT FOR YOU TO DO YOUR WORK, TAKE CARE OF THINGS AT HOME, OR GET ALONG WITH OTHER PEOPLE: NOT DIFFICULT AT ALL

## 2020-10-26 ASSESSMENT — MIFFLIN-ST. JEOR: SCORE: 1530.18

## 2020-10-26 ASSESSMENT — PATIENT HEALTH QUESTIONNAIRE - PHQ9
SUM OF ALL RESPONSES TO PHQ QUESTIONS 1-9: 0
5. POOR APPETITE OR OVEREATING: NOT AT ALL

## 2020-10-26 NOTE — PATIENT INSTRUCTIONS
-Schedule your mammogram if not already done.    -Daily total calcium intake (between food/supplements) should be 1000mg which equates to 3-4 servings calcium containing food per day; VItamin D 1000IU.   Foods rich in calcium are: milk, cheese, yogurt, seafood, sardines and canned salmon, leafy green vegetables such as rosa greens, spinach and kale, beans and lentils, almonds, seeds (poppy, sesame, celery, josue), rhubarb, dried fruit such as figs, whey protein, tofu and edamame, amaranth, other foods with added calcium such as orange juice and some cereals.   If adequate amount not taken in diet, then a supplement may be needed.     -I also recommend increasing your dietary fiber by starting Metamucil (powder mixed in glass of water) twice daily

## 2020-10-27 ASSESSMENT — ANXIETY QUESTIONNAIRES: GAD7 TOTAL SCORE: 2

## 2020-11-06 ENCOUNTER — ANCILLARY PROCEDURE (OUTPATIENT)
Dept: MAMMOGRAPHY | Facility: CLINIC | Age: 45
End: 2020-11-06
Payer: COMMERCIAL

## 2020-11-06 DIAGNOSIS — Z12.31 VISIT FOR SCREENING MAMMOGRAM: ICD-10-CM

## 2020-11-06 PROCEDURE — 77067 SCR MAMMO BI INCL CAD: CPT | Performed by: RADIOLOGY

## 2021-01-15 ENCOUNTER — HEALTH MAINTENANCE LETTER (OUTPATIENT)
Age: 46
End: 2021-01-15

## 2021-09-04 ENCOUNTER — HEALTH MAINTENANCE LETTER (OUTPATIENT)
Age: 46
End: 2021-09-04

## 2021-11-22 DIAGNOSIS — N80.9 ENDOMETRIOSIS: ICD-10-CM

## 2021-11-22 RX ORDER — DESOGESTREL AND ETHINYL ESTRADIOL 0.15-0.03
KIT ORAL
Qty: 84 TABLET | Refills: 0 | Status: SHIPPED | OUTPATIENT
Start: 2021-11-22 | End: 2021-12-16

## 2021-11-22 NOTE — TELEPHONE ENCOUNTER
"Requested Prescriptions   Pending Prescriptions Disp Refills     ENSKYCE 0.15-30 MG-MCG tablet [Pharmacy Med Name: DESOGEST/ETH EST(ENSKYCE) TABS 28'S 0.15MG/30MCG] 84 tablet 3     Sig: TAKE 1 TABLET DAILY       Contraceptives Protocol Passed - 11/22/2021 12:14 AM        Passed - Patient is not a current smoker if age is 35 or older        Passed - Recent (12 mo) or future (30 days) visit within the authorizing provider's specialty     Patient has had an office visit with the authorizing provider or a provider within the authorizing providers department within the previous 12 mos or has a future within next 30 days. See \"Patient Info\" tab in inbasket, or \"Choose Columns\" in Meds & Orders section of the refill encounter.              Passed - Medication is active on med list        Passed - No active pregnancy on record        Passed - No positive pregnancy test in past 12 months           Next 5 appointments (look out 90 days)    Dec 15, 2021  8:30 AM  Screening Mammogram with [facility] [facility] [place] ([facility] ) [address] [place] [address], [address]  [facility] [address] [phone]        Appt with Dr. Valiente 12/16/22  Prescription approved per [facility] Refill Protocol.  Bernadette Elias RN on 11/22/2021 at 8:53 AM    "

## 2021-12-15 ENCOUNTER — ANCILLARY PROCEDURE (OUTPATIENT)
Dept: MAMMOGRAPHY | Facility: CLINIC | Age: 46
End: 2021-12-15
Attending: INTERNAL MEDICINE
Payer: COMMERCIAL

## 2021-12-15 DIAGNOSIS — Z12.31 VISIT FOR SCREENING MAMMOGRAM: ICD-10-CM

## 2021-12-15 PROCEDURE — 77067 SCR MAMMO BI INCL CAD: CPT | Mod: TC | Performed by: RADIOLOGY

## 2021-12-16 ENCOUNTER — OFFICE VISIT (OUTPATIENT)
Dept: OBGYN | Facility: CLINIC | Age: 46
End: 2021-12-16
Payer: COMMERCIAL

## 2021-12-16 VITALS
BODY MASS INDEX: 29.82 KG/M2 | DIASTOLIC BLOOD PRESSURE: 74 MMHG | HEART RATE: 74 BPM | SYSTOLIC BLOOD PRESSURE: 116 MMHG | WEIGHT: 179 LBS | HEIGHT: 65 IN

## 2021-12-16 DIAGNOSIS — Z12.11 SCREEN FOR COLON CANCER: ICD-10-CM

## 2021-12-16 DIAGNOSIS — Z01.419 ENCOUNTER FOR GYNECOLOGICAL EXAMINATION WITHOUT ABNORMAL FINDING: Primary | ICD-10-CM

## 2021-12-16 DIAGNOSIS — N80.9 ENDOMETRIOSIS: ICD-10-CM

## 2021-12-16 PROCEDURE — 99396 PREV VISIT EST AGE 40-64: CPT | Performed by: OBSTETRICS & GYNECOLOGY

## 2021-12-16 PROCEDURE — G0145 SCR C/V CYTO,THINLAYER,RESCR: HCPCS | Performed by: OBSTETRICS & GYNECOLOGY

## 2021-12-16 PROCEDURE — 87624 HPV HI-RISK TYP POOLED RSLT: CPT | Performed by: OBSTETRICS & GYNECOLOGY

## 2021-12-16 RX ORDER — DESOGESTREL AND ETHINYL ESTRADIOL 0.15-0.03
1 KIT ORAL DAILY
Qty: 84 TABLET | Refills: 4 | Status: SHIPPED | OUTPATIENT
Start: 2021-12-16 | End: 2021-12-16

## 2021-12-16 RX ORDER — PROMETHAZINE HYDROCHLORIDE 25 MG/1
25 TABLET ORAL
COMMUNITY
Start: 2021-10-28

## 2021-12-16 RX ORDER — NARATRIPTAN 2.5 MG/1
2.5 TABLET ORAL
COMMUNITY
Start: 2021-10-28

## 2021-12-16 RX ORDER — NAPROXEN SODIUM 550 MG/1
550 TABLET ORAL
COMMUNITY
Start: 2021-10-28

## 2021-12-16 RX ORDER — DESOGESTREL AND ETHINYL ESTRADIOL 0.15-0.03
1 KIT ORAL DAILY
Qty: 112 TABLET | Refills: 8 | Status: SHIPPED | OUTPATIENT
Start: 2021-12-16 | End: 2022-12-21

## 2021-12-16 ASSESSMENT — ANXIETY QUESTIONNAIRES
7. FEELING AFRAID AS IF SOMETHING AWFUL MIGHT HAPPEN: NOT AT ALL
6. BECOMING EASILY ANNOYED OR IRRITABLE: NOT AT ALL
2. NOT BEING ABLE TO STOP OR CONTROL WORRYING: NOT AT ALL
IF YOU CHECKED OFF ANY PROBLEMS ON THIS QUESTIONNAIRE, HOW DIFFICULT HAVE THESE PROBLEMS MADE IT FOR YOU TO DO YOUR WORK, TAKE CARE OF THINGS AT HOME, OR GET ALONG WITH OTHER PEOPLE: NOT DIFFICULT AT ALL
3. WORRYING TOO MUCH ABOUT DIFFERENT THINGS: NOT AT ALL
GAD7 TOTAL SCORE: 0
5. BEING SO RESTLESS THAT IT IS HARD TO SIT STILL: NOT AT ALL
1. FEELING NERVOUS, ANXIOUS, OR ON EDGE: NOT AT ALL

## 2021-12-16 ASSESSMENT — PATIENT HEALTH QUESTIONNAIRE - PHQ9
5. POOR APPETITE OR OVEREATING: NOT AT ALL
SUM OF ALL RESPONSES TO PHQ QUESTIONS 1-9: 0

## 2021-12-16 ASSESSMENT — MIFFLIN-ST. JEOR: SCORE: 1462.82

## 2021-12-16 NOTE — PATIENT INSTRUCTIONS
-Daily total calcium intake (between food/supplements) should be 1000mg which equates to 3-4 servings calcium containing food per day; VItamin D 1000IU.   Foods rich in calcium are: milk, cheese, yogurt, seafood, sardines and canned salmon, leafy green vegetables such as rosa greens, spinach and kale, beans and lentils, almonds, seeds (poppy, sesame, celery, josue), rhubarb, dried fruit such as figs, whey protein, tofu and edamame, amaranth, other foods with added calcium such as orange juice and some cereals.   If adequate amount not taken in diet, then a supplement may be needed.     -I also recommend increasing your dietary fiber by starting Metamucil (powder mixed in glass of water) twice daily

## 2021-12-16 NOTE — PROGRESS NOTES
May is a 46 year old  female who presents for annual exam.     Besides routine health maintenance, she has no other health concerns today .    HPI:  The patient's PCP is  Edna Vasquez MD.      Uses OCPs xtended dosing, periods q 2 mo.     Has lost wt intentionally        GYNECOLOGIC HISTORY:    No LMP recorded. (Menstrual status: Birth Control).    Regular menses? no  Menses every 60 days.  Length of menses: 5 days    Her current contraception method is: oral contraceptives.  She  reports that she has never smoked. She has never used smokeless tobacco.    Patient is sexually active.  STD testing offered?  Declined  Last PHQ-9 score on record =   PHQ-9 SCORE 2021   PHQ-9 Total Score 0     Last GAD7 score on record =   JOSE-7 SCORE 2021   Total Score 0     Alcohol Score = 1    HEALTH MAINTENANCE:  Cholesterol:   Recent Labs   Lab Test 10/26/20  0851   CHOL 211*   HDL 72   *   TRIG 119     Last Mammo: 12/15/2021, Result: Normal, Next Mammo:   Pap: (  Lab Results   Component Value Date    PAP NIL HPV- 2018      Colonoscopy:  never, Result: Not applicable, Next Colonoscopy: due at age 45-50 years.  Dexa:  never    Health maintenance updated:  yes    HISTORY:  OB History    Para Term  AB Living   0 0 0 0 0 0   SAB IAB Ectopic Multiple Live Births   0 0 0 0 0       Patient Active Problem List   Diagnosis     CARDIOVASCULAR SCREENING; LDL GOAL LESS THAN 160     Generalized pruritus     Allergic state     Past Surgical History:   Procedure Laterality Date     TONSILLECTOMY & ADENOIDECTOMY        Social History     Tobacco Use     Smoking status: Never Smoker     Smokeless tobacco: Never Used   Substance Use Topics     Alcohol use: Yes     Comment: 1-2 drinks per week       Problem (# of Occurrences) Relation (Name,Age of Onset)    Anesthesia Reaction (1) Sister    Chronic Obstructive Pulmonary Disease (1) Mother    Clotting Disorder (3) Mother, Maternal Grandmother,  Sister    Colon Polyps (1) Mother    Coronary Artery Disease (1) Maternal Grandmother    Diabetes (1) Maternal Grandmother    Genetic Disorder (1) Mother    Heart Failure (2) Maternal Grandmother, Paternal Grandfather    Hypertension (2) Mother, Maternal Grandmother    Leukemia (1) Maternal Grandfather    Low Back Problems (1) Sister    Lupus (1) Mother    Migraines (2) Mother, Sister    Other - See Comments (3) Mother: circulation problems, ovarian tumor, Mast Cell Activation Disorder, common variable immuno-deficiency, Maternal Grandmother: circulation problems, Sister: immune disorder,     Other Cancer (1) Maternal Grandfather    Seasonal/Environmental Allergies (2) Mother, Sister    Seizure Disorder (2) Mother, Sister    Substance Abuse (1) Father    Thyroid Disease (1) Sister            Current Outpatient Medications   Medication Sig     albuterol (PROAIR HFA/PROVENTIL HFA/VENTOLIN HFA) 108 (90 Base) MCG/ACT Inhaler Inhale 2 puffs into the lungs every 6 hours as needed for shortness of breath / dyspnea or wheezing     desogestrel-ethinyl estradiol (ENSKYCE) 0.15-30 MG-MCG tablet Take 1 tablet by mouth daily Of active pills only for 2 packs in a row, then on the second pack take the placebo pills and allow menses.     diphenhydrAMINE (BENADRYL) 25 MG capsule Take 25 mg by mouth every 6 hours as needed for itching or allergies     famotidine (PEPCID) 20 MG tablet Take 1 tablet (20 mg) by mouth 2 times daily     fexofenadine (ALLEGRA ALLERGY) 180 MG tablet Take 180 mg by mouth 2 times daily     Fish Oil-Cholecalciferol (FISH OIL + D3) 8275-9010 MG-UNIT CAPS Take 2 capsules by mouth daily     montelukast (SINGULAIR) 10 MG tablet Take 10 mg by mouth At Bedtime     Multiple Vitamins-Minerals (MULTIVITAMIN ADULT) TABS Take 1 tablet by mouth daily     naproxen sodium (ANAPROX) 550 MG tablet Take 550 mg by mouth     naratriptan (AMERGE) 2.5 MG tablet Take 2.5 mg by mouth     promethazine (PHENERGAN) 25 MG tablet Take 25  "mg by mouth     No current facility-administered medications for this visit.     Allergies   Allergen Reactions     Seasonal Allergies      Latex Rash       Past medical, surgical, social and family histories were reviewed and updated in EPIC.    ROS:   12 point review of systems negative other than symptoms noted below or in the HPI.  No urinary frequency or dysuria, bladder or kidney problems    EXAM:  /74   Pulse 74   Ht 1.651 m (5' 5\")   Wt 81.2 kg (179 lb)   BMI 29.79 kg/m     BMI: Body mass index is 29.79 kg/m .    PHYSICAL EXAM:  Constitutional:   Appearance: Well nourished, well developed, alert, in no acute distress  Neck:  Lymph Nodes:  No lymphadenopathy present    Thyroid:  Gland size normal, nontender, no nodules or masses present  on palpation  Chest:  Respiratory Effort:  Breathing unlabored  Cardiovascular:    Heart: Auscultation:  Regular rate, normal rhythm, no murmurs present  Breasts: Inspection of Breasts:  No lymphadenopathy present., Palpation of Breasts and Axillae:  No masses present on palpation, no breast tenderness., Axillary Lymph Nodes:  No lymphadenopathy present. and No nodularity, asymmetry or nipple discharge bilaterally.  Gastrointestinal:   Abdominal Examination:  Abdomen nontender to palpation, tone normal without rigidity or guarding, no masses present, umbilicus without lesions   Liver and Spleen:  No hepatomegaly present, liver nontender to palpation    Hernias:  No hernias present  Lymphatic: Lymph Nodes:  No other lymphadenopathy present  Skin:  General Inspection:  No rashes present, no lesions present, no areas of  discoloration  Neurologic:    Mental Status:  Oriented X3.  Normal strength and tone, sensory exam                grossly normal, mentation intact and speech normal.    Psychiatric:   Mentation appears normal and affect normal/bright.         Pelvic Exam:  External Genitalia:     Normal appearance for age, no discharge present, no tenderness present, " no inflammatory lesions present, color normal  Vagina:     Normal vaginal vault without central or paravaginal defects, no discharge present, no inflammatory lesions present, no masses present  Bladder:     Nontender to palpation  Urethra:   Urethral Body:  Urethra palpation normal, urethra structural support normal   Urethral Meatus:  No erythema or lesions present  Cervix:     Appearance healthy, no lesions present, nontender to palpation, no bleeding present  Uterus:     Uterus: firm, normal sized and nontender, midplane in position.   Adnexa:     No adnexal tenderness present, no adnexal masses present  Perineum:     Perineum within normal limits, no evidence of trauma, no rashes or skin lesions present  Anus:     Anus within normal limits, no hemorrhoids present  Inguinal Lymph Nodes:     No lymphadenopathy present  Pubic Hair:     Normal pubic hair distribution for age  Genitalia and Groin:     No rashes present, no lesions present, no areas of discoloration, no masses present      COUNSELING:   Reviewed preventive health counseling, as reflected in patient instructions       Osteoporosis prevention/bone health    BMI: Body mass index is 29.79 kg/m .  Weight management plan: Discussed healthy diet and exercise guidelines    ASSESSMENT:  46 year old female with satisfactory annual exam.    ICD-10-CM    1. Encounter for gynecological examination without abnormal finding  Z01.419 Adult Gastro Ref - Procedure Only   2. Endometriosis  N80.9 desogestrel-ethinyl estradiol (ENSKYCE) 0.15-30 MG-MCG tablet     DISCONTINUED: desogestrel-ethinyl estradiol (ENSKYCE) 0.15-30 MG-MCG tablet   3. Screen for colon cancer  Z12.11 Adult Gastro Ref - Procedure Only       PLAN:  -UTD for cervical cancer screening. Reviewed guidelines-pap q 3yrs until age 30 when co-testing q 5 years.  -Breast self awareness discussed. UTD for mammogram.  -Discussed exercise-making plan, strength training. Nutrition encouraged.  -Colonoscopy orders  placed  -Osteoporosis prevention discussed.  -Refill OCPs, doing well. Extended dosing, with period q 2 mo.   -Return one year for next annual exam          Donna Hou Masters, DO

## 2021-12-17 ASSESSMENT — ANXIETY QUESTIONNAIRES: GAD7 TOTAL SCORE: 0

## 2021-12-21 LAB
BKR LAB AP GYN ADEQUACY: NORMAL
BKR LAB AP GYN INTERPRETATION: NORMAL
BKR LAB AP HPV REFLEX: NORMAL
BKR LAB AP PREVIOUS ABNORMAL: NORMAL
PATH REPORT.COMMENTS IMP SPEC: NORMAL
PATH REPORT.COMMENTS IMP SPEC: NORMAL
PATH REPORT.RELEVANT HX SPEC: NORMAL

## 2021-12-22 LAB
HUMAN PAPILLOMA VIRUS 16 DNA: NEGATIVE
HUMAN PAPILLOMA VIRUS 18 DNA: NEGATIVE
HUMAN PAPILLOMA VIRUS FINAL DIAGNOSIS: NORMAL
HUMAN PAPILLOMA VIRUS OTHER HR: NEGATIVE

## 2022-10-10 ENCOUNTER — IMMUNIZATION (OUTPATIENT)
Dept: FAMILY MEDICINE | Facility: CLINIC | Age: 47
End: 2022-10-10
Payer: COMMERCIAL

## 2022-10-10 DIAGNOSIS — Z23 NEED FOR PROPHYLACTIC VACCINATION AND INOCULATION AGAINST INFLUENZA: Primary | ICD-10-CM

## 2022-10-10 PROCEDURE — 90686 IIV4 VACC NO PRSV 0.5 ML IM: CPT

## 2022-10-10 PROCEDURE — 90471 IMMUNIZATION ADMIN: CPT

## 2022-10-10 PROCEDURE — 99207 PR NO CHARGE NURSE ONLY: CPT

## 2022-12-19 ENCOUNTER — ANCILLARY PROCEDURE (OUTPATIENT)
Dept: MAMMOGRAPHY | Facility: CLINIC | Age: 47
End: 2022-12-19
Attending: INTERNAL MEDICINE
Payer: COMMERCIAL

## 2022-12-19 DIAGNOSIS — Z12.31 VISIT FOR SCREENING MAMMOGRAM: ICD-10-CM

## 2022-12-19 PROCEDURE — 77067 SCR MAMMO BI INCL CAD: CPT | Mod: TC | Performed by: STUDENT IN AN ORGANIZED HEALTH CARE EDUCATION/TRAINING PROGRAM

## 2022-12-20 NOTE — PROGRESS NOTES
May is a 47 year old  female who presents for annual exam.     Besides routine health maintenance, she has no other health concerns today .    HPI:  The patient's PCP is  Edna Vasquez MD.      Happy with OCPs, no issues, need refills.       GYNECOLOGIC HISTORY:    Patient's last menstrual period was 2022 (exact date).    Regular menses? no  Menses every 60 days.  Length of menses: 5 days    Her current contraception method is: oral contraceptives.  She  reports that she has never smoked. She has never used smokeless tobacco.    Patient is sexually active.  STD testing offered?  Declined     Last PHQ-9 score on record =   PHQ-9 SCORE 2022   PHQ-9 Total Score 0     Last GAD7 score on record =   JOSE-7 SCORE 2022   Total Score 0     Alcohol Score = 1    HEALTH MAINTENANCE:  Cholesterol:   Recent Labs   Lab Test 10/26/20  0851   CHOL 211*   HDL 72   *   TRIG 119     Last Mammo: 2022, Result: Normal, Next Mammo: Due 2023    Pap:   Lab Results   Component Value Date    PAP (+q5) NILM; neg HPV 2021    PAP NIL 2018      Colonoscopy: NA, Result: Not applicable, Next Colonoscopy: Due.  Dexa:  Never, due at age 67.    Health maintenance updated:  Colorectal Screening due, immunizations reviewed     HISTORY:  OB History    Para Term  AB Living   0 0 0 0 0 0   SAB IAB Ectopic Multiple Live Births   0 0 0 0 0       Patient Active Problem List   Diagnosis     CARDIOVASCULAR SCREENING; LDL GOAL LESS THAN 160     Generalized pruritus     Allergic state     Past Surgical History:   Procedure Laterality Date     TONSILLECTOMY & ADENOIDECTOMY        Social History     Tobacco Use     Smoking status: Never     Smokeless tobacco: Never   Substance Use Topics     Alcohol use: Yes     Comment: 1-2 drinks per week       Problem (# of Occurrences) Relation (Name,Age of Onset)    Substance Abuse (1) Father    Heart Failure (2) Maternal Grandmother, Paternal  Grandfather    Anesthesia Reaction (1) Sister    Diabetes (1) Maternal Grandmother    Genetic Disorder (1) Mother    Hypertension (2) Mother, Maternal Grandmother    Clotting Disorder (3) Mother, Sister, Maternal Grandmother    Thyroid Disease (1) Sister    Squamous cell carcinoma (1) Mother: Vulvar cancer    Lupus (1) Mother    Other Cancer (1) Maternal Grandfather    Coronary Artery Disease (1) Maternal Grandmother    Migraines (2) Mother, Sister    Seizure Disorder (2) Mother, Sister    Colon Polyps (1) Mother    Other - See Comments (3) Mother: circulation problems, ovarian tumor, Mast Cell Activation Disorder, common variable immuno-deficiency, Sister: immune disorder, , Maternal Grandmother: circulation problems    Chronic Obstructive Pulmonary Disease (1) Mother    Leukemia (1) Maternal Grandfather    Seasonal/Environmental Allergies (2) Mother, Sister    Low Back Problems (1) Sister            Current Outpatient Medications   Medication Sig     desogestrel-ethinyl estradiol (ENSKYCE) 0.15-30 MG-MCG tablet Take 1 tablet by mouth daily Of active pills only for 2 packs in a row, then on the second pack take the placebo pills and allow menses.     diphenhydrAMINE (BENADRYL) 25 MG capsule Take 25 mg by mouth every 6 hours as needed for itching or allergies     famotidine (PEPCID) 20 MG tablet Take 1 tablet (20 mg) by mouth 2 times daily     fexofenadine (ALLEGRA) 180 MG tablet Take 180 mg by mouth 2 times daily     Fish Oil-Cholecalciferol (FISH OIL + D3) 9789-9751 MG-UNIT CAPS Take 2 capsules by mouth daily     montelukast (SINGULAIR) 10 MG tablet Take 10 mg by mouth At Bedtime     naproxen sodium (ANAPROX) 550 MG tablet Take 550 mg by mouth     naratriptan (AMERGE) 2.5 MG tablet Take 2.5 mg by mouth     promethazine (PHENERGAN) 25 MG tablet Take 25 mg by mouth     topiramate (TOPAMAX) 100 MG tablet Take 100 mg by mouth daily     albuterol (PROAIR HFA/PROVENTIL HFA/VENTOLIN HFA) 108 (90 Base) MCG/ACT Inhaler  "Inhale 2 puffs into the lungs every 6 hours as needed for shortness of breath / dyspnea or wheezing (Patient not taking: Reported on 12/21/2022)     No current facility-administered medications for this visit.     Allergies   Allergen Reactions     Seasonal Allergies      Latex Rash       Past medical, surgical, social and family histories were reviewed and updated in EPIC.    ROS:   12 point review of systems negative other than symptoms noted below or in the HPI.         EXAM:  /72   Ht 1.651 m (5' 5\")   Wt 87.8 kg (193 lb 9.6 oz)   LMP 12/17/2022 (Exact Date)   Breastfeeding No   BMI 32.22 kg/m     BMI: Body mass index is 32.22 kg/m .    PHYSICAL EXAM:  Constitutional:   Appearance: Well nourished, well developed, alert, in no acute distress  Neck:  Lymph Nodes:  No lymphadenopathy present    Thyroid:  Gland size normal, nontender, no nodules or masses present  on palpation  Chest:  Respiratory Effort:  Breathing unlabored  Cardiovascular:    Heart: Auscultation:  Regular rate, normal rhythm, no murmurs present  Breasts: Inspection of Breasts:  No lymphadenopathy present., Palpation of Breasts and Axillae:  No masses present on palpation, no breast tenderness., Axillary Lymph Nodes:  No lymphadenopathy present. and No nodularity, asymmetry or nipple discharge bilaterally.  Gastrointestinal:   Abdominal Examination:  Abdomen nontender to palpation, tone normal without rigidity or guarding, no masses present, umbilicus without lesions   Liver and Spleen:  No hepatomegaly present, liver nontender to palpation    Hernias:  No hernias present  Lymphatic: Lymph Nodes:  No other lymphadenopathy present  Skin:  General Inspection:  No rashes present, no lesions present, no areas of  discoloration  Neurologic:    Mental Status:  Oriented X3.  Normal strength and tone, sensory exam                grossly normal, mentation intact and speech normal.    Psychiatric:   Mentation appears normal and affect " normal/bright.         Pelvic Exam:  External Genitalia:     Normal appearance for age, no discharge present, no tenderness present, no inflammatory lesions present, color normal  Vagina:     Normal vaginal vault without central or paravaginal defects, no discharge present, no inflammatory lesions present, no masses present  Bladder:     Nontender to palpation  Urethra:   Urethral Body:  Urethra palpation normal, urethra structural support normal   Urethral Meatus:  No erythema or lesions present  Cervix:     Appearance healthy, no lesions present, nontender to palpation, no bleeding present  Uterus:     Uterus: firm, normal sized and nontender, midplane in position.   Adnexa:     No adnexal tenderness present, no adnexal masses present  Perineum:     Perineum within normal limits, no evidence of trauma, no rashes or skin lesions present  Anus:     Anus within normal limits, no hemorrhoids present  Inguinal Lymph Nodes:     No lymphadenopathy present  Pubic Hair:     Normal pubic hair distribution for age  Genitalia and Groin:     No rashes present, no lesions present, no areas of discoloration, no masses present      COUNSELING:   Reviewed preventive health counseling, as reflected in patient instructions       Osteoporosis prevention/bone health    BMI: Body mass index is 32.22 kg/m .      ASSESSMENT:  47 year old female with satisfactory annual exam.    ICD-10-CM    1. Encounter for gynecological examination without abnormal finding  Z01.419       2. Endometriosis  N80.9 desogestrel-ethinyl estradiol (ENSKYCE) 0.15-30 MG-MCG tablet      3. Screen for colon cancer  Z12.11 Colonoscopy Screening  Referral      4. Hypocalcemia  E83.51 Comprehensive metabolic panel (BMP + Alb, Alk Phos, ALT, AST, Total. Bili, TP)     Parathyroid Hormone Intact     Comprehensive metabolic panel (BMP + Alb, Alk Phos, ALT, AST, Total. Bili, TP)     Parathyroid Hormone Intact      5. Screening for metabolic disorder  Z13.228  Comprehensive metabolic panel (BMP + Alb, Alk Phos, ALT, AST, Total. Bili, TP)     Parathyroid Hormone Intact     Comprehensive metabolic panel (BMP + Alb, Alk Phos, ALT, AST, Total. Bili, TP)     Parathyroid Hormone Intact      6. Family history of thyroid disease  Z83.49 TSH with free T4 reflex     TSH with free T4 reflex          PLAN:  -UTD for cervical cancer screening. Reviewed guidelines-pap q 3yrs until age 30 when co-testing q 5 years.  -Breast self awareness discussed. UTD for mammogram.  -Colonoscopy due, ordered  -Osteoporosis prevention discussed.  -Hypocalcemia hx, FHx thyroid dz. Plan metabolic labs  -Endometriosis. Refill OCPs for the year, doing well, desires to continue  -Return one year for next annual exam          Donna Hou Masters, DO

## 2022-12-21 ENCOUNTER — OFFICE VISIT (OUTPATIENT)
Dept: OBGYN | Facility: CLINIC | Age: 47
End: 2022-12-21
Payer: COMMERCIAL

## 2022-12-21 VITALS
DIASTOLIC BLOOD PRESSURE: 72 MMHG | HEIGHT: 65 IN | SYSTOLIC BLOOD PRESSURE: 119 MMHG | BODY MASS INDEX: 32.26 KG/M2 | WEIGHT: 193.6 LBS

## 2022-12-21 DIAGNOSIS — Z01.419 ENCOUNTER FOR GYNECOLOGICAL EXAMINATION WITHOUT ABNORMAL FINDING: Primary | ICD-10-CM

## 2022-12-21 DIAGNOSIS — N80.9 ENDOMETRIOSIS: ICD-10-CM

## 2022-12-21 DIAGNOSIS — Z13.228 SCREENING FOR METABOLIC DISORDER: ICD-10-CM

## 2022-12-21 DIAGNOSIS — Z83.49 FAMILY HISTORY OF THYROID DISEASE: ICD-10-CM

## 2022-12-21 DIAGNOSIS — E83.51 HYPOCALCEMIA: ICD-10-CM

## 2022-12-21 DIAGNOSIS — Z12.11 SCREEN FOR COLON CANCER: ICD-10-CM

## 2022-12-21 LAB
ALBUMIN SERPL BCG-MCNC: 4 G/DL (ref 3.5–5.2)
ALP SERPL-CCNC: 81 U/L (ref 35–104)
ALT SERPL W P-5'-P-CCNC: 18 U/L (ref 10–35)
ANION GAP SERPL CALCULATED.3IONS-SCNC: 12 MMOL/L (ref 7–15)
AST SERPL W P-5'-P-CCNC: 29 U/L (ref 10–35)
BILIRUB SERPL-MCNC: 0.2 MG/DL
BUN SERPL-MCNC: 10.7 MG/DL (ref 6–20)
CALCIUM SERPL-MCNC: 9 MG/DL (ref 8.6–10)
CHLORIDE SERPL-SCNC: 107 MMOL/L (ref 98–107)
CREAT SERPL-MCNC: 0.75 MG/DL (ref 0.51–0.95)
DEPRECATED CALCIDIOL+CALCIFEROL SERPL-MC: 23 UG/L (ref 20–75)
DEPRECATED HCO3 PLAS-SCNC: 23 MMOL/L (ref 22–29)
GFR SERPL CREATININE-BSD FRML MDRD: >90 ML/MIN/1.73M2
GLUCOSE SERPL-MCNC: 83 MG/DL (ref 70–99)
POTASSIUM SERPL-SCNC: 3.7 MMOL/L (ref 3.4–5.3)
PROT SERPL-MCNC: 6.6 G/DL (ref 6.4–8.3)
PTH-INTACT SERPL-MCNC: 38 PG/ML (ref 15–65)
SODIUM SERPL-SCNC: 142 MMOL/L (ref 136–145)
TSH SERPL DL<=0.005 MIU/L-ACNC: 0.84 UIU/ML (ref 0.3–4.2)

## 2022-12-21 PROCEDURE — 84443 ASSAY THYROID STIM HORMONE: CPT | Performed by: OBSTETRICS & GYNECOLOGY

## 2022-12-21 PROCEDURE — 83970 ASSAY OF PARATHORMONE: CPT | Performed by: OBSTETRICS & GYNECOLOGY

## 2022-12-21 PROCEDURE — 99396 PREV VISIT EST AGE 40-64: CPT | Performed by: OBSTETRICS & GYNECOLOGY

## 2022-12-21 PROCEDURE — 80053 COMPREHEN METABOLIC PANEL: CPT | Performed by: OBSTETRICS & GYNECOLOGY

## 2022-12-21 PROCEDURE — 36415 COLL VENOUS BLD VENIPUNCTURE: CPT | Performed by: OBSTETRICS & GYNECOLOGY

## 2022-12-21 PROCEDURE — 82306 VITAMIN D 25 HYDROXY: CPT | Performed by: OBSTETRICS & GYNECOLOGY

## 2022-12-21 RX ORDER — DESOGESTREL AND ETHINYL ESTRADIOL 0.15-0.03
1 KIT ORAL DAILY
Qty: 112 TABLET | Refills: 8 | Status: SHIPPED | OUTPATIENT
Start: 2022-12-21 | End: 2024-01-18

## 2022-12-21 RX ORDER — TOPIRAMATE 100 MG/1
150 TABLET, FILM COATED ORAL DAILY
COMMUNITY
Start: 2022-07-08

## 2022-12-21 ASSESSMENT — ANXIETY QUESTIONNAIRES
IF YOU CHECKED OFF ANY PROBLEMS ON THIS QUESTIONNAIRE, HOW DIFFICULT HAVE THESE PROBLEMS MADE IT FOR YOU TO DO YOUR WORK, TAKE CARE OF THINGS AT HOME, OR GET ALONG WITH OTHER PEOPLE: NOT DIFFICULT AT ALL
7. FEELING AFRAID AS IF SOMETHING AWFUL MIGHT HAPPEN: NOT AT ALL
GAD7 TOTAL SCORE: 0
6. BECOMING EASILY ANNOYED OR IRRITABLE: NOT AT ALL
GAD7 TOTAL SCORE: 0
3. WORRYING TOO MUCH ABOUT DIFFERENT THINGS: NOT AT ALL
1. FEELING NERVOUS, ANXIOUS, OR ON EDGE: NOT AT ALL
2. NOT BEING ABLE TO STOP OR CONTROL WORRYING: NOT AT ALL
5. BEING SO RESTLESS THAT IT IS HARD TO SIT STILL: NOT AT ALL

## 2022-12-21 ASSESSMENT — PATIENT HEALTH QUESTIONNAIRE - PHQ9
5. POOR APPETITE OR OVEREATING: NOT AT ALL
SUM OF ALL RESPONSES TO PHQ QUESTIONS 1-9: 0

## 2022-12-21 NOTE — PATIENT INSTRUCTIONS
-Daily total calcium intake (between food/supplements) should be 1000mg which equates to 3-4 servings calcium containing food per day; VItamin D 1000IU.   Foods rich in calcium are: milk, cheese, yogurt, seafood, sardines and canned salmon, leafy green vegetables such as rosa greens, spinach and kale, beans and lentils, almonds, seeds (poppy, sesame, celery, josue), rhubarb, dried fruit such as figs, whey protein, tofu and edamame, amaranth, other foods with added calcium such as orange juice and some cereals.   If adequate amount not taken in diet, then a supplement may be needed.     -I also recommend increasing your dietary fiber by starting Metamucil (powder mixed in glass of water) once to twice daily

## 2023-02-04 ENCOUNTER — MYC MEDICAL ADVICE (OUTPATIENT)
Dept: OBGYN | Facility: CLINIC | Age: 48
End: 2023-02-04
Payer: COMMERCIAL

## 2023-02-06 NOTE — CONFIDENTIAL NOTE
Currently on desogestrel-ethinyl estradiol (ENSKYCE) 0.15-30 MG-MCG tablet  Sig - Route: Take 1 tablet by mouth daily Of active pills only for 2 packs in a row, then on the second pack take the placebo pills and allow menses.    Routing Wayna message to Aydee WHITE CNP to review and advise.    Yanira Shanks RN

## 2023-04-18 ENCOUNTER — MYC MEDICAL ADVICE (OUTPATIENT)
Dept: OBGYN | Facility: CLINIC | Age: 48
End: 2023-04-18
Payer: COMMERCIAL

## 2023-04-18 NOTE — TELEPHONE ENCOUNTER
Panel Management Review    Date of last visit with a [facility] provider: Dr. Valiente on 12/21/22.  Date of next visit with a [facility] provider: None.    Health Maintenance List    Health Maintenance   Topic Date Due     ADVANCE CARE PLANNING  Never done     HEPATITIS B IMMUNIZATION (1 of 3 - 3-dose series) Never done     COLORECTAL CANCER SCREENING  Never done     HIV SCREENING  Never done     DTAP/TDAP/TD IMMUNIZATION (3 - Tdap) 03/01/1994     HEPATITIS C SCREENING  Never done     PHQ-2 (once per calendar year)  01/01/2023     MAMMO SCREENING  12/19/2023     YEARLY PREVENTIVE VISIT  12/21/2023     LIPID  10/26/2025     HPV TEST  12/16/2026     PAP  12/16/2026     INFLUENZA VACCINE  Completed     COVID-19 Vaccine  Completed     Pneumococcal Vaccine: Pediatrics (0 to 5 Years) and At-Risk Patients (6 to 64 Years)  Aged Out     IPV IMMUNIZATION  Aged Out     MENINGITIS IMMUNIZATION  Aged Out       Composite cancer screening  Chart review shows that this patient is due/due soon for the following Colonoscopy  Lab Results   Component Value Date    PAP NIL 09/06/2018     Past Surgical History:   Procedure Laterality Date     TONSILLECTOMY & ADENOIDECTOMY  1987     Summary:    Patient is due/failing the following:   Colonoscopy    Action needed: Patient needs referral/order    Type of outreach:  Sent zoidu message.      Staff Signature:  Donna Espinal LPN on 4/18/2023 at 11:14 AM

## 2023-04-25 ENCOUNTER — TELEPHONE (OUTPATIENT)
Dept: GASTROENTEROLOGY | Facility: CLINIC | Age: 48
End: 2023-04-25
Payer: COMMERCIAL

## 2023-04-25 NOTE — TELEPHONE ENCOUNTER
Screening Questions  BLUE  KIND OF PREP RED  LOCATION [review exclusion criteria] GREEN  SEDATION TYPE        y Are you active on mychart?       s, Donna Hou, DO in [facility] Ordering/Referring Provider?        BCBS What type of coverage do you have?      n Have you had a positive covid test in the last 14 days?     30.3 1. BMI  [BMI 40+ - review exclusion criteria& smart-phrase document]    y  2. Are you able to give consent for your medical care? [IF NO,RN REVIEW]          n  3. Are you taking any prescription pain medications on a routine schedule   (ex narcotics: oxycodone, roxicodone, oxycontin,  and percocet)? [RN Review]        n  3a. EXTENDED PREP What kind of prescription?     n 4. Do you have any chemical dependencies such as alcohol, street drugs, or methadone?        **If yes 3- 5 , please schedule with MAC sedation.**          IF YES TO ANY 6 - 10 - HOSPITAL SETTING ONLY.     n 6.   Do you need assistance transferring?     n 7.   Have you had a heart or lung transplant?    n 8.   Are you currently on dialysis?   n 9.   Do you use daily home oxygen?   n 10. Do you take nitroglycerin?   10a. n If yes, how often?     11. [FEMALES]  n Are you currently pregnant?    11a. n If yes, how many weeks? [ Greater than 12 weeks, OR NEEDED]    n 12. Do you have Pulmonary Hypertension? *NEED PAC APPT AT [facility] w/ [facility]*     n 13. [review exclusion criteria]  Do you have any implantable devices in your body (pacemaker, defib, LVAD)?    n 14. In the past 6 months, have you had any heart related issues including cardiomyopathy or heart attack?     14a. n If yes, did it require cardiac stenting if so when?     n 15. Have you had a stroke or Transient ischemic attack (TIA - aka  mini stroke ) within 6 months?      n 16. Do you have mod to severe Obstructive Sleep Apnea?  [Hospital only]    n 17. Do you have SEVERE AND UNCONTROLLED asthma? *NEED PAC APPT AT [facility] w/MAC*     18. Are you currently taking any blood  "thinners?     18a. No. Continue to 19.   18b. Yes/no Blood Thinner: No [CONTINUE TO #19]    n 19. Do you take the medication Phentermine?    19a. If yes, \"Hold for 7 days before procedure.  Please consult your prescribing provider if you have questions about holding this medication.\"     n  20. Do you have chronic kidney disease?      n  21. Do you have a diagnosis of diabetes?     n  22. On a regular basis do you go 3-5 days between bowel movements?      23. Preferred LOCAL Pharmacy for Pre Prescription    [ LIST ONLY ONE PHARMACY]          [id number] - [place], [place] - [address] & [facility]        - CLOSING REMINDERS -    Informed patient they will need an adult    Cannot take any type of public or medical transportation alone    Conscious Sedation- Needs  for 6 hours after the procedure       MAC/General-Needs  for 24 hours after procedure    Pre-Procedure Covid test to be completed [[facility] PCR Testing Required]    Confirmed Nurse will call to complete assessment       - SCHEDULING DETAILS -  n Hospital Setting Required? If yes, what is the exclusion?: lm Galloway  Surgeon    06/12/2023  Date of Procedure  Lower Endoscopy [Colonoscopy]  [facility]AL[facility]ATORADE WITHOUT MAGNEISUM CITRATE Which Colonoscopy Prep was Sent?     Moderate Sedation Type     n PAC / Pre-op Required                 "

## 2023-05-19 ENCOUNTER — NURSE TRIAGE (OUTPATIENT)
Dept: FAMILY MEDICINE | Facility: CLINIC | Age: 48
End: 2023-05-19
Payer: COMMERCIAL

## 2023-05-19 DIAGNOSIS — U07.1 INFECTION DUE TO 2019 NOVEL CORONAVIRUS: Primary | ICD-10-CM

## 2023-05-19 NOTE — TELEPHONE ENCOUNTER
RN COVID TREATMENT VISIT  05/19/23      The patient has been triaged and does not require a higher level of care.    May Aldana  48 year old  Current weight? 193 lbs    Has the patient been seen by a primary care provider at an [facility] or [facility] within the past two years? Yes.   Have you been in close proximity to/do you have a known exposure to a person with a confirmed case of influenza? No.     General treatment eligibility:  Date of positive COVID test (PCR or at home)?  5/18/23    Are you or have you been hospitalized for this COVID-19 infection? No.   Have you received monoclonal antibodies or antiviral treatment for COVID-19 since this positive test? No.   Do you have any of the following conditions that place you at risk of being very sick from COVID-19?   - Chronic lung diseases such as asthma, bronchiectasis, COPD, interstitial lung disease, pulmonary embolism, pulmonary hypertension   - Overweight or Obesity (BMI >85th percentile or BMI 25 or higher)  Yes, patient has at least one high risk condition as noted above.     Current COVID symptoms:   - fever or chills  - cough  - shortness of breath or difficulty breathing  - muscle or body aches  - headache  - sore throat  - nausea or vomiting  Yes. Patient has at least one symptom as selected.     How many days since symptoms started? 5 days or less. Established patient, 12 years or older weighing at least 88.2 lbs, who has symptoms that started in the past 5 days, has not been hospitalized nor received treatment already, and is at risk for being very sick from COVID-19.     Treatment eligibility by RN:    Are you currently pregnant or nursing? No    Do you have a clinically significant hypersensitivity to nirmatrelvir or ritonavir, or toxic epidermal necrolysis (TEN) or Davis-Wayne Syndrome? No    Do you have a history of hepatitis, any hepatic impairment on the Problem List (such as Child-Mcdermott Class C,  cirrhosis, fatty liver disease, alcoholic liver disease), or was the last liver lab (hepatic panel, ALT, AST, ALK Phos, bilirubin) elevated in the past 6 months? No    Do you have any history of severe renal impairment (eGFR < 30mL/min)? No    Is patient eligible to continue?   Yes, patient meets all eligibility requirements for the RN COVID treatment (as denoted by all no responses above).     Current Outpatient Medications   Medication Sig Dispense Refill     albuterol (PROAIR HFA/PROVENTIL HFA/VENTOLIN HFA) 108 (90 Base) MCG/ACT Inhaler Inhale 2 puffs into the lungs every 6 hours as needed for shortness of breath / dyspnea or wheezing (Patient not taking: Reported on 12/21/2022)       desogestrel-ethinyl estradiol (ENSKYCE) 0.15-30 MG-MCG tablet Take 1 tablet by mouth daily Of active pills only for 2 packs in a row, then on the second pack take the placebo pills and allow menses. 112 tablet 8     diphenhydrAMINE (BENADRYL) 25 MG capsule Take 25 mg by mouth every 6 hours as needed for itching or allergies       famotidine (PEPCID) 20 MG tablet Take 1 tablet (20 mg) by mouth 2 times daily       fexofenadine (ALLEGRA) 180 MG tablet Take 180 mg by mouth 2 times daily       Fish Oil-Cholecalciferol (FISH OIL + D3) 4553-8062 MG-UNIT CAPS Take 2 capsules by mouth daily       montelukast (SINGULAIR) 10 MG tablet Take 10 mg by mouth At Bedtime       naproxen sodium (ANAPROX) 550 MG tablet Take 550 mg by mouth       naratriptan (AMERGE) 2.5 MG tablet Take 2.5 mg by mouth       promethazine (PHENERGAN) 25 MG tablet Take 25 mg by mouth       topiramate (TOPAMAX) 100 MG tablet Take 100 mg by mouth daily         Medications from List 1 of the standing order (on medications that exclude the use of Paxlovid) that patient is taking: NONE. Is patient taking Marilin's Wort? No  Is patient taking Delphos's Wort or any meds from List 1? No.   Medications from List 2 of the standing order (on meds that provider needs to adjust) that  patient is taking: NONE. Is patient on any of the meds from List 2? No.   Medications from List 3 of standing order (on meds that a RN needs to adjust) that patient is taking: NONE. Is patient on any meds from List 3? No.     Paxlovid has an approximate 90% reduction in hospitalization. Paxlovid can possibly cause altered sense of taste, diarrhea (loose, watery stools), high blood pressure, muscle aches.     Would patient like a Paxlovid prescription?   Yes.   Lab Results   Component Value Date    GFRESTIMATED >90 12/21/2022       Was last eGFR reduced? No, eGFR 60 or greater/ No Result on record. Patient can receive the normal renal function dose. Paxlovid Rx sent to [place] [facility]   [facility] [address]    Temporary change to home medications: None    All medication adjustments (holds, etc) were discussed with the patient and patient was asked to repeat back (teachback) their med adjustment.  Did patient understand med adjustment? No medication adjustments needed.         Reviewed the following instructions with the patient:    Paxlovid (nimatrelvir and ritonavir)    How it works  Two medicines (nirmatrelvir and ritonavir) are taken together. They stop the virus from growing. Less amount of virus is easier for your body to fight.    How to take    Medicine comes in a daily container with both medicine tablets. Take by mouth twice daily (once in the morning, once at night) for 5 days.    The number of tablets to take varies by patient.    Don't chew or break capsules. Swallow whole.    When to take  Take as soon as possible after positive COVID-19 test result, and within 5 days of your first symptoms.    Possible side effects  Can cause altered sense of taste, diarrhea (loose, watery stools), high blood pressure, muscle aches.    Gillian Marks, RN                Reason for Disposition    MILD difficulty breathing (e.g., minimal/no SOB at rest, SOB with walking, pulse <100)    Additional Information    Negative:  SEVERE difficulty breathing (e.g., struggling for each breath, speaks in single words)    Negative: Difficult to awaken or acting confused (e.g., disoriented, slurred speech)    Negative: Bluish (or gray) lips or face now    Negative: Shock suspected (e.g., cold/pale/clammy skin, too weak to stand, low BP, rapid pulse)    Negative: Sounds like a life-threatening emergency to the triager    Negative: SEVERE or constant chest pain or pressure  (Exception: Mild central chest pain, present only when coughing.)    Negative: MODERATE difficulty breathing (e.g., speaks in phrases, SOB even at rest, pulse 100-120)    Negative: [1] Headache AND [2] stiff neck (can't touch chin to chest)    Negative: Oxygen level (e.g., pulse oximetry) 90 percent or lower    Negative: Chest pain or pressure    Negative: Patient sounds very sick or weak to the triager    Protocols used: CORONAVIRUS (COVID-19) DIAGNOSED OR [id number]

## 2023-05-19 NOTE — TELEPHONE ENCOUNTER
Called pt and left a vm with phone #[phone] with information to call and receive treatment.     Ferny Montes RN

## 2023-05-19 NOTE — TELEPHONE ENCOUNTER
Routing to [facility] Hub to see if patient qualifies for antiviral treatment.   Also routing to ambulatory as patient tested positive for Covid 5/18/23.    Reason for Disposition    [1] HIGH RISK for severe COVID complications (e.g., weak immune system, age > 64 years, obesity with BMI of 30 or higher, pregnant, chronic lung disease or other chronic medical condition) AND [2] COVID symptoms (e.g., cough, fever)  (Exceptions: Already seen by PCP and no new or worsening symptoms.)    Additional Information    Negative: SEVERE difficulty breathing (e.g., struggling for each breath, speaks in single words)    Negative: Difficult to awaken or acting confused (e.g., disoriented, slurred speech)    Negative: Bluish (or gray) lips or face now    Negative: Shock suspected (e.g., cold/pale/clammy skin, too weak to stand, low BP, rapid pulse)    Negative: Sounds like a life-threatening emergency to the triager    Negative: [1] Diagnosed or suspected COVID-19 AND [2] symptoms lasting 3 or more weeks    Negative: [1] COVID-19 exposure AND [2] no symptoms    Negative: COVID-19 vaccine reaction suspected (e.g., fever, headache, muscle aches) occurring 1 to 3 days after getting vaccine    Negative: COVID-19 vaccine, questions about    Negative: [1] Lives with someone known to have influenza (flu test positive) AND [2] flu-like symptoms (e.g., cough, runny nose, sore throat, SOB; with or without fever)    Negative: [1] Adult with possible COVID-19 symptoms AND [2] triager concerned about severity of symptoms or other causes    Negative: COVID-19 and breastfeeding, questions about    Negative: SEVERE or constant chest pain or pressure  (Exception: Mild central chest pain, present only when coughing.)    Negative: MODERATE difficulty breathing (e.g., speaks in phrases, SOB even at rest, pulse 100-120)    Negative: Headache and stiff neck (can't touch chin to chest)    Negative: Oxygen level (e.g., pulse oximetry) 90 percent or lower     "Negative: Chest pain or pressure  (Exception: MILD central chest pain, present only when coughing)    Negative: Patient sounds very sick or weak to the triager    Negative: MILD difficulty breathing (e.g., minimal/no SOB at rest, SOB with walking, pulse <100)    Negative: Fever > 103 F (39.4 C)    Negative: [1] Fever > 101 F (38.3 C) AND [2] over 60 years of age    Negative: [1] Fever > 100.0 F (37.8 C) AND [2] bedridden (e.g., nursing home patient, CVA, chronic illness, recovering from surgery)    Answer Assessment - Initial Assessment Questions  1. COVID-19 DIAGNOSIS: \"Who made your COVID-19 diagnosis?\" \"Was it confirmed by a positive lab test or self-test?\" If not diagnosed by a doctor (or NP/PA), ask \"Are there lots of cases (community spread) where you live?\" Note: See public health department website, if unsure.        At home test positive 5/18/23    2. COVID-19 EXPOSURE: \"Was there any known exposure to COVID before the symptoms began?\" CDC Definition of close contact: within 6 feet (2 meters) for a total of 15 minutes or more over a 24-hour period.         Does not remember    3. ONSET: \"When did the COVID-19 symptoms start?\"         5/18/23    4. WORST SYMPTOM: \"What is your worst symptom?\" (e.g., cough, fever, shortness of breath, muscle aches)        Fever    5. COUGH: \"Do you have a cough?\" If Yes, ask: \"How bad is the cough?\"          Dry cough, mild to moderate    6. FEVER: \"Do you have a fever?\" If Yes, ask: \"What is your temperature, how was it measured, and when did it start?\"        101.7    7. RESPIRATORY STATUS: \"Describe your breathing?\" (e.g., shortness of breath, wheezing, unable to speak)         None    8. BETTER-SAME-WORSE: \"Are you getting better, staying the same or getting worse compared to yesterday?\"  If getting worse, ask, \"In what way?\"        Worse more symptoms today (cough)    9. HIGH RISK DISEASE: \"Do you have any chronic medical problems?\" (e.g., asthma, heart or lung disease, " "weak immune system, obesity, etc.)        See dx list    10. VACCINE: \"Have you had the COVID-19 vaccine?\" If Yes, ask: \"Which one, how many shots, when did you get it?\"          Yes    11. BOOSTER: \"Have you received your COVID-19 booster?\" If Yes, ask: \"Which one and when did you get it?\"          2 booster    12. PREGNANCY: \"Is there any chance you are pregnant?\" \"When was your last menstrual period?\"          No    13. OTHER SYMPTOMS: \"Do you have any other symptoms?\"  (e.g., chills, fatigue, headache, loss of smell or taste, muscle pain, sore throat)          Chills, fatigue, headache, decrease smell or taste, muscle pain, sore throat    14. O2 SATURATION MONITOR:  \"Do you use an oxygen saturation monitor (pulse oximeter) at home?\" If Yes, ask \"What is your reading (oxygen level) today?\" \"What is your usual oxygen saturation reading?\" (e.g., 95%)          99%    Protocols used: CORONAVIRUS (COVID-19) DIAGNOSED OR [id number]    DISCUSS WITH PCP AND CALLBACK BY NURSE WITHIN 1 HOUR.      COVID-19 - HOW TO PROTECT OTHERS - WHEN YOU ARE SICK WITH COVID-19:  * STAY HOME A MINIMUM OF 5 DAYS: Home isolation is needed for at least 5 days after the symptoms started. Stay home from school or work if you are sick. Do NOT go to Faith services,  centers, shopping, or other public places. Do NOT use public transportation (e.g., bus, taxis, ride-sharing). Do NOT allow any visitors to your home. Leave the house only if you need to seek urgent medical care.  * WEAR A MASK FOR 10 DAYS: Wear a well-fitted mask for 10 full days any time you are around others inside your home or in public. Do not go to places where you are unable to wear a mask.  * AVOID TRAVEL: Avoid travel for 10 days after you tested positive for COVID-19.   * WASH HANDS OFTEN: Wash hands often with soap and water. After coughing or sneezing are important times. If soap and water are not available, use an alcohol-based hand  with at " least 60% alcohol, covering all surfaces of your hands and rubbing them together until they feel dry. Avoid touching your eyes, nose, and mouth with unwashed hands.  * CALL AHEAD IF MEDICAL CARE IS NEEDED: If you have a medical appointment, call your doctor's office and tell them you have or may have COVID-19. This will help the office protect themselves and other patients. They will give you directions.      HUMIDIFIER:  * If the air is dry, use a humidifier in the bedroom.  * Dry air makes coughs worse.      COVID-19 - EXPOSURE RISK FACTORS:  * Here are the main risk factors for getting sick with COVID-19.  * CLOSE CONTACT WITH A PERSON who tested positive for COVID-19 AND contact occurred while they were ill. Close contact means being within 6 feet (2 meters) for a total of 15 minutes or more in a 24-hour period. This includes living with someone infected with COVID-19.  * Living in or travel to an area where there is HIGH COMMUNITY SPREAD of COVID-19.  * INTERNATIONAL TRAVEL: The CDC (https://www.cdc.gov/coronavirus/2019-ncov/travelers) has the most up-to-date list of where COVID-19 outbreaks are occurring.  * NOT BEING UP-TO-DATE ON COVID-19 VACCINATIONS.      CLEAN YOUR HANDS OFTEN:  * WASH HANDS: Wash your hands often with soap and water for at least 20 seconds. This is especially important after blowing your nose, coughing, or sneezing; going to the bathroom; and before eating or preparing food.  * USE HAND : If soap and water are not available, use an alcohol-based hand  with at least 60% alcohol, covering all surfaces of your hands and rubbing them together until they feel dry.  * Avoid touching your eyes, nose, and mouth with unwashed hands.      CLEAN 'HIGH TOUCH' SURFACES EVERY DAY:  * Clean high-touch surfaces in your isolation area ('sick room' and bathroom) every day.  * High-touch surfaces include phones, remote controls, counters, tabletops, doorknobs, bathroom fixtures, toilets,  keyboards, tablets, and bedside tables.      COUGH MEDICINES:  * COUGH DROPS: Over-the-counter cough drops can help a lot, especially for mild coughs. They soothe an irritated throat and remove the tickle sensation in the back of the throat. Cough drops are easy to carry with you.  * COUGH SYRUP WITH DEXTROMETHORPHAN: An over-the-counter cough syrup can help your cough. The most common cough suppressant in over-the-counter cough medicines is dextromethorphan.  * HOME REMEDY - HARD CANDY: Hard candy works just as well as over-the-counter cough drops. People who have diabetes should use sugar-free candy.  * HOME REMEDY - HONEY: This old home remedy has been shown to help decrease coughing at night. The adult dosage is 2 teaspoons (10 ml) at bedtime.      COUGH SYRUP WITH DEXTROMETHORPHAN:  * Cough syrups containing the cough suppressant dextromethorphan may help decrease your cough.  * Cough syrup works best for coughs that keep you awake at night. It can also sometimes help in the late stages of a lung or airway infection when the cough is dry and hacking. Cough syrup can be used along with cough drops.  * Examples: Delsym 12-hour Cough, Robitussin Cough Long-Acting, Triaminic Long-Acting, Vicks DayQuil Cough.      COUGHING SPELLS:  * Drink warm fluids. Inhale warm mist. This can help relax the airway and also loosen up phlegm.  * Suck on cough drops or hard candy to coat the irritated throat.      PAIN AND FEVER MEDICINES:  * For pain or fever relief, take either acetaminophen or ibuprofen.  * They are over-the-counter (OTC) drugs that help treat both fever and pain. You can buy them at the [facility].  * Treat fevers above 101 F (38.3 C). The goal of fever therapy is to bring the fever down to a comfortable level. Remember that fever medicine usually lowers fever 2 degrees F (1 - 1 1/2 degrees C).  * ACETAMINOPHEN REGULAR STRENGTH TYLENOL: Take 650 mg (two 325 mg pills) by mouth every 4 to 6 hours as needed. Each  Regular Strength Tylenol pill has 325 mg of acetaminophen. The most you should take each day is 3,250 mg (10 pills a day).   * ACETAMINOPHEN - EXTRA STRENGTH TYLENOL: Take 1,000 mg (two 500 mg pills) every 8 hours as needed. Each Extra Strength Tylenol pill has 500 mg of acetaminophen. The most you should take each day is 3,000 mg (6 pills a day).  * IBUPROFEN (E.G., MOTRIN, ADVIL): Take 400 mg (two 200 mg pills) by mouth every 6 hours. The most you should take each day is 1,200 mg (six 200 mg pills), unless your doctor has told you to take more.      CALL BACK IF:   * Fever over 103 F (39.4 C)  * Fever lasts over 3 days  * Fever returns after being gone for 24 hours  * Chest pain or difficulty breathing occurs  * You become worse        Patient/Caregiver understands and will follow care advice? Yes, with modifications     Grisel GAVIN [facility] Triage Team

## 2023-06-12 ENCOUNTER — HOSPITAL ENCOUNTER (OUTPATIENT)
Facility: CLINIC | Age: 48
Discharge: HOME | End: 2023-06-12
Attending: INTERNAL MEDICINE | Admitting: INTERNAL MEDICINE
Payer: COMMERCIAL

## 2023-06-12 VITALS
DIASTOLIC BLOOD PRESSURE: 71 MMHG | BODY MASS INDEX: 29.73 KG/M2 | RESPIRATION RATE: 19 BRPM | HEIGHT: 66 IN | SYSTOLIC BLOOD PRESSURE: 123 MMHG | HEART RATE: 82 BPM | WEIGHT: 185 LBS | OXYGEN SATURATION: 100 %

## 2023-06-12 LAB — COLONOSCOPY: NORMAL

## 2023-06-12 PROCEDURE — G0500 MOD SEDAT ENDO SERVICE >5YRS: HCPCS | Performed by: INTERNAL MEDICINE

## 2023-06-12 PROCEDURE — 45378 DIAGNOSTIC COLONOSCOPY: CPT | Performed by: INTERNAL MEDICINE

## 2023-06-12 PROCEDURE — G0121 COLON CA SCRN NOT HI RSK IND: HCPCS | Performed by: INTERNAL MEDICINE

## 2023-06-12 PROCEDURE — 250N000011 HC RX IP 250 OP 636: Performed by: INTERNAL MEDICINE

## 2023-06-12 RX ORDER — FENTANYL CITRATE 50 UG/ML
INJECTION, SOLUTION INTRAMUSCULAR; INTRAVENOUS PRN
Status: DISCONTINUED | OUTPATIENT
Start: 2023-06-12 | End: 2023-06-12 | Stop reason: HOSPADM

## 2023-06-12 ASSESSMENT — ACTIVITIES OF DAILY LIVING (ADL): ADLS_ACUITY_SCORE: 35

## 2023-06-12 NOTE — H&P
[facility]  Pre-Endoscopy History and Physical     May Aldana MRN# [id number]   YOB: 1975 Age: 48 year old     Date of Procedure: (Not on file)  Primary care provider: Edna Vasquez  Type of Endoscopy: colonoscopy  Reason for Procedure: Screening  Type of Anesthesia Anticipated: Moderate Sedation    HPI:    May is a 48 year old female who will be undergoing the above procedure.      A history and physical has been performed. The patient's medications and allergies have been reviewed. The risks and benefits of the procedure and the sedation options and risks were discussed with the patient.  All questions were answered and informed consent was obtained.      She denies a personal or family history of anesthesia complications or bleeding disorders.     Allergies   Allergen Reactions     Seasonal Allergies      Latex Rash        Cannot display prior to admission medications because the patient has not been admitted in this contact.       Patient Active Problem List   Diagnosis     CARDIOVASCULAR SCREENING; LDL GOAL LESS THAN 160     Generalized pruritus     Allergic state        Past Medical History:   Diagnosis Date     Immunodeficiency (H)     Histamine disorder     Kidney infection      Low back pain      Migraine         Past Surgical History:   Procedure Laterality Date     TONSILLECTOMY & ADENOIDECTOMY  1987       Social History     Tobacco Use     Smoking status: Never     Smokeless tobacco: Never   Vaping Use     Vaping status: Never Used     Passive vaping exposure: Yes   Substance Use Topics     Alcohol use: Yes     Comment: 1-2 drinks per week        Family History   Problem Relation Age of Onset     Hypertension Mother      Colon Polyps Mother      Genetic Disorder Mother      Lupus Mother      Chronic Obstructive Pulmonary Disease Mother      Clotting Disorder Mother      Seasonal/Environmental Allergies Mother      Other - See Comments Mother   "       circulation problems, ovarian tumor, Mast Cell Activation Disorder, common variable immuno-deficiency     Seizure Disorder Mother      Migraines Mother      Squamous cell carcinoma Mother         Vulvar cancer     Substance Abuse Father      Anesthesia Reaction Sister      Thyroid Disease Sister      Other - See Comments Sister         immune disorder,      Seasonal/Environmental Allergies Sister      Clotting Disorder Sister      Migraines Sister      Seizure Disorder Sister      Low Back Problems Sister      Heart Failure Maternal Grandmother      Clotting Disorder Maternal Grandmother      Coronary Artery Disease Maternal Grandmother      Diabetes Maternal Grandmother      Hypertension Maternal Grandmother      Other - See Comments Maternal Grandmother         circulation problems     Other Cancer Maternal Grandfather      Leukemia Maternal Grandfather      Heart Failure Paternal Grandfather        REVIEW OF SYSTEMS:     5 point ROS negative except as noted above in HPI, including Gen., Resp., CV, GI &  system review.      PHYSICAL EXAM:   There were no vitals taken for this visit. Estimated body mass index is 32.22 kg/m  as calculated from the following:    Height as of 12/21/22: 1.651 m (5' 5\").    Weight as of 12/21/22: 87.8 kg (193 lb 9.6 oz).   GENERAL APPEARANCE: healthy, alert and no distress  MENTAL STATUS: alert  AIRWAY EXAM: Mallampatti Class I (visualization of the soft palate, fauces, uvula, anterior and posterior pillars)  RESP: lungs clear to auscultation - no rales, rhonchi or wheezes  CV: regular rates and rhythm      DIAGNOSTICS:    Not indicated      IMPRESSION   ASA Class 2 - Mild systemic disease        PLAN:       Plan for colonoscopy. We discussed the risks, benefits and alternatives and the patient wished to proceed.    The above has been forwarded to the consulting provider.      Signed Electronically by: Tej Galloway MD,MD  June 12, 2023      [facility]  Ph: " [phone] Fax: [phone]

## 2023-11-20 ENCOUNTER — PATIENT OUTREACH (OUTPATIENT)
Dept: CARE COORDINATION | Facility: CLINIC | Age: 48
End: 2023-11-20
Payer: COMMERCIAL

## 2023-11-21 ENCOUNTER — PATIENT OUTREACH (OUTPATIENT)
Dept: CARE COORDINATION | Facility: CLINIC | Age: 48
End: 2023-11-21
Payer: COMMERCIAL

## 2023-12-05 ENCOUNTER — PATIENT OUTREACH (OUTPATIENT)
Dept: CARE COORDINATION | Facility: CLINIC | Age: 48
End: 2023-12-05
Payer: COMMERCIAL

## 2024-01-08 ENCOUNTER — ANCILLARY PROCEDURE (OUTPATIENT)
Dept: MAMMOGRAPHY | Facility: CLINIC | Age: 49
End: 2024-01-08
Attending: INTERNAL MEDICINE
Payer: COMMERCIAL

## 2024-01-08 DIAGNOSIS — Z12.31 VISIT FOR SCREENING MAMMOGRAM: ICD-10-CM

## 2024-01-08 PROCEDURE — 77067 SCR MAMMO BI INCL CAD: CPT | Mod: TC | Performed by: RADIOLOGY

## 2024-01-18 ENCOUNTER — OFFICE VISIT (OUTPATIENT)
Dept: OBGYN | Facility: CLINIC | Age: 49
End: 2024-01-18
Payer: COMMERCIAL

## 2024-01-18 VITALS
HEIGHT: 65 IN | SYSTOLIC BLOOD PRESSURE: 110 MMHG | WEIGHT: 192 LBS | DIASTOLIC BLOOD PRESSURE: 74 MMHG | BODY MASS INDEX: 31.99 KG/M2

## 2024-01-18 DIAGNOSIS — Z01.419 ENCOUNTER FOR GYNECOLOGICAL EXAMINATION WITHOUT ABNORMAL FINDING: Primary | ICD-10-CM

## 2024-01-18 DIAGNOSIS — N80.9 ENDOMETRIOSIS: ICD-10-CM

## 2024-01-18 PROCEDURE — 99396 PREV VISIT EST AGE 40-64: CPT | Performed by: OBSTETRICS & GYNECOLOGY

## 2024-01-18 PROCEDURE — 99213 OFFICE O/P EST LOW 20 MIN: CPT | Mod: 25 | Performed by: OBSTETRICS & GYNECOLOGY

## 2024-01-18 RX ORDER — DESOGESTREL AND ETHINYL ESTRADIOL 0.15-0.03
1 KIT ORAL DAILY
Qty: 112 TABLET | Refills: 0 | Status: SHIPPED | OUTPATIENT
Start: 2024-01-18

## 2024-01-18 ASSESSMENT — PATIENT HEALTH QUESTIONNAIRE - PHQ9
5. POOR APPETITE OR OVEREATING: NOT AT ALL
SUM OF ALL RESPONSES TO PHQ QUESTIONS 1-9: 1

## 2024-01-18 ASSESSMENT — ANXIETY QUESTIONNAIRES
GAD7 TOTAL SCORE: 0
5. BEING SO RESTLESS THAT IT IS HARD TO SIT STILL: NOT AT ALL
1. FEELING NERVOUS, ANXIOUS, OR ON EDGE: NOT AT ALL
GAD7 TOTAL SCORE: 0
IF YOU CHECKED OFF ANY PROBLEMS ON THIS QUESTIONNAIRE, HOW DIFFICULT HAVE THESE PROBLEMS MADE IT FOR YOU TO DO YOUR WORK, TAKE CARE OF THINGS AT HOME, OR GET ALONG WITH OTHER PEOPLE: NOT DIFFICULT AT ALL
2. NOT BEING ABLE TO STOP OR CONTROL WORRYING: NOT AT ALL
7. FEELING AFRAID AS IF SOMETHING AWFUL MIGHT HAPPEN: NOT AT ALL
6. BECOMING EASILY ANNOYED OR IRRITABLE: NOT AT ALL
3. WORRYING TOO MUCH ABOUT DIFFERENT THINGS: NOT AT ALL

## 2024-01-18 NOTE — PROGRESS NOTES
May is a 48 year old  female who presents for annual exam.     Besides routine health maintenance, she has no other health concerns today .    HPI:  The patient's PCP is  Edna Vasquez MD.      Has been having a rough time controlling migraines. Follows with Neuro at [facility].     Has a histamine disorder. Is nervous about new medications as a result      GYNECOLOGIC HISTORY:    Patient's last menstrual period was 2023.    Regular menses? yes  Menses every 2 months  Length of menses: 2 days    Her current contraception method is: oral contraceptives.  She  reports that she has never smoked. She has never used smokeless tobacco.    Patient is sexually active.  STD testing offered?  Declined  Last PHQ-9 score on record =       2024     1:39 PM   PHQ-9 SCORE   PHQ-9 Total Score 1     Last GAD7 score on record =       2024     1:39 PM   JOSE-7 SCORE   Total Score 0     Alcohol Score = 0    HEALTH MAINTENANCE:  Care Gaps  Close care gaps  Overdue          Never done ADVANCE CARE PLANNING (Every 5 Years)     Never done HEPATITIS B IMMUNIZATION (1 of 3 - 3-dose series)     Never done HIV SCREENING (Once)     1992 IPV IMMUNIZATION (2 of 3 - 4-dose series)  Last completed: Mar 15, 1992     MAR 1  1994 DTAP/TDAP/TD IMMUNIZATION (3 - Tdap)  Last completed: 1994     Never done HEPATITIS C SCREENING (Once)     DEC 21  2023 YEARLY PREVENTIVE VISIT (Yearly)  Last completed: Dec 21, 2022              Upcoming          2025 MAMMO SCREENING (Yearly)  Last completed: 2024     OCT 26  2025 LIPID (Every 5 Years)  Last completed: Oct 26, 2020     DEC 16  2026 HPV TEST (Once)  Last completed: Dec 16, 2021     DEC 16  2026 PAP (Every 5 Years)  Last completed: Dec 16, 2021     COLLEEN 12  2033 COLORECTAL CANCER SCREENING (COLONOSCOPY) (Every 10 Years)  Last completed: 2023     Health maintenance updated:  yes    HISTORY:  OB History    Para Term  AB Living   0 0 0 0 0 0    SAB IAB Ectopic Multiple Live Births   0 0 0 0 0       Patient Active Problem List   Diagnosis    CARDIOVASCULAR SCREENING; LDL GOAL LESS THAN 160    Generalized pruritus    Allergic state     Past Surgical History:   Procedure Laterality Date    COLONOSCOPY N/A 6/12/2023    Procedure: Colonoscopy;  Surgeon: Tej Galloway MD;  Location:  GI    TONSILLECTOMY & ADENOIDECTOMY  1987      Social History     Tobacco Use    Smoking status: Never    Smokeless tobacco: Never   Substance Use Topics    Alcohol use: Yes     Comment: 1-2 drinks per week       Problem (# of Occurrences) Relation (Name,Age of Onset)    Substance Abuse (1) Father    Heart Failure (2) Maternal Grandmother, Paternal Grandfather    Anesthesia Reaction (1) Sister    Diabetes (1) Maternal Grandmother    Genetic Disorder (1) Mother    Hypertension (2) Mother, Maternal Grandmother    Clotting Disorder (3) Mother, Sister, Maternal Grandmother    Thyroid Disease (1) Sister    Squamous cell carcinoma (1) Mother: Vulvar cancer    Lupus (1) Mother    Other Cancer (1) Maternal Grandfather    Coronary Artery Disease (1) Maternal Grandmother    Migraines (2) Mother, Sister    Seizure Disorder (2) Mother, Sister    Colon Polyps (1) Mother    Other - See Comments (3) Mother: circulation problems, ovarian tumor, Mast Cell Activation Disorder, common variable immuno-deficiency, Sister: immune disorder, , Maternal Grandmother: circulation problems    Chronic Obstructive Pulmonary Disease (1) Mother    Leukemia (1) Maternal Grandfather    Seasonal/Environmental Allergies (2) Mother, Sister    Low Back Problems (1) Sister              Current Outpatient Medications   Medication Sig    albuterol (PROAIR HFA/PROVENTIL HFA/VENTOLIN HFA) 108 (90 Base) MCG/ACT Inhaler Inhale 2 puffs into the lungs every 6 hours as needed for shortness of breath or wheezing    desogestrel-ethinyl estradiol (ENSKYCE) 0.15-30 MG-MCG tablet Take 1 tablet by mouth daily Of active  "pills only for 2 packs in a row, then on the second pack take the placebo pills and allow menses.    diphenhydrAMINE (BENADRYL) 25 MG capsule Take 25 mg by mouth every 6 hours as needed for itching or allergies    famotidine (PEPCID) 20 MG tablet Take 1 tablet (20 mg) by mouth 2 times daily    fexofenadine (ALLEGRA) 180 MG tablet Take 180 mg by mouth 2 times daily    Fish Oil-Cholecalciferol (FISH OIL + D3) 8110-8936 MG-UNIT CAPS Take 2 capsules by mouth daily    montelukast (SINGULAIR) 10 MG tablet Take 10 mg by mouth At Bedtime    naproxen sodium (ANAPROX) 550 MG tablet Take 550 mg by mouth    naratriptan (AMERGE) 2.5 MG tablet Take 2.5 mg by mouth    promethazine (PHENERGAN) 25 MG tablet Take 25 mg by mouth    topiramate (TOPAMAX) 100 MG tablet Take 150 mg by mouth daily     No current facility-administered medications for this visit.     Allergies   Allergen Reactions    Seasonal Allergies     Latex Rash       Past medical, surgical, social and family histories were reviewed and updated in EPIC.    ROS:   12 point review of systems negative other than symptoms noted below or in the HPI.  No urinary frequency or dysuria, bladder or kidney problems    EXAM:  /74   Ht 1.651 m (5' 5\")   Wt 87.1 kg (192 lb)   LMP 11/18/2023   BMI 31.95 kg/m     BMI: Body mass index is 31.95 kg/m .    PHYSICAL EXAM:  Constitutional:   Appearance: Well nourished, well developed, alert, in no acute distress  Neck:  Lymph Nodes:  No lymphadenopathy present    Thyroid:  Gland size normal, nontender, no nodules or masses present  on palpation  Chest:  Respiratory Effort:  Breathing unlabored  Cardiovascular:    Heart: Auscultation:  Regular rate, normal rhythm, no murmurs present  Breasts: Inspection of Breasts:  No lymphadenopathy present., Palpation of Breasts and Axillae:  No masses present on palpation, no breast tenderness., Axillary Lymph Nodes:  No lymphadenopathy present., and No nodularity, asymmetry or nipple discharge " bilaterally.  Gastrointestinal:   Abdominal Examination:  Abdomen nontender to palpation, tone normal without rigidity or guarding, no masses present, umbilicus without lesions   Liver and Spleen:  No hepatomegaly present, liver nontender to palpation    Hernias:  No hernias present  Lymphatic: Lymph Nodes:  No other lymphadenopathy present  Skin:  General Inspection:  No rashes present, no lesions present, no areas of  discoloration  Neurologic:    Mental Status:  Oriented X3.  Normal strength and tone, sensory exam                grossly normal, mentation intact and speech normal.    Psychiatric:   Mentation appears normal and affect normal/bright.         Pelvic Exam:  External Genitalia:     Normal appearance for age, no discharge present, no tenderness present, no inflammatory lesions present, color normal  Vagina:     Normal vaginal vault without central or paravaginal defects, no discharge present, no inflammatory lesions present, no masses present  Bladder:     Nontender to palpation  Urethra:   Urethral Body:  Urethra palpation normal, urethra structural support normal   Urethral Meatus:  No erythema or lesions present  Cervix:     Appearance healthy, no lesions present, nontender to palpation, no bleeding present  Uterus:     Uterus: firm, normal sized and nontender, midplane in position.   Adnexa:     No adnexal tenderness present, no adnexal masses present  Perineum:     Perineum within normal limits, no evidence of trauma, no rashes or skin lesions present  Anus:     Anus within normal limits, no hemorrhoids present  Inguinal Lymph Nodes:     No lymphadenopathy present  Pubic Hair:     Normal pubic hair distribution for age  Genitalia and Groin:     No rashes present, no lesions present, no areas of discoloration, no masses present    COUNSELING:   Reviewed preventive health counseling, as reflected in patient instructions       Osteoporosis prevention/bone health    BMI: Body mass index is 31.95  kg/m .      ASSESSMENT:  48 year old female with satisfactory annual exam.    ICD-10-CM    1. Encounter for gynecological examination without abnormal finding  Z01.419       2. Endometriosis  N80.9 desogestrel-ethinyl estradiol (ENSKYCE) 0.15-30 MG-MCG tablet          PLAN:  -UTD for cervical cancer screening.   -Breast self awareness discussed. UTD for mammogram.  -Colonoscopy UTD (6/2023) for 10yr  -Osteoporosis prevention discussed.  -Chronic endometriosis well controlled with MADAN's. However, upon review of Neuro note, patient diagnosed with migraine w/ visual aura. Discussed inc risks of stroke in this situation and would therefore rec changing from MADAN's. Additionally has hx of thrombophlebitis post IV placement. Reviewed alternative options for menstrual/pain control, progesterone only, lupron, hysterectomy (should one of these fail). Pros/cons discussed. Questions answered.   Has neuro appt coming up. Will discuss and consider options for changes, mirena IUD vs lupron. Would suggest changing off OCP in next 2-3 mo.   -Return one year for next annual exam    (10 additional minutes were spent on the date of the encounter doing chart review, review of outside records, review and interpretation of pertinent test results, history and exam, documentation, patient counseling, and further activities as noted above as well as the typical preventative women's health exam.       Donna Hou Masters, DO

## 2024-01-22 ENCOUNTER — MYC MEDICAL ADVICE (OUTPATIENT)
Dept: OBGYN | Facility: CLINIC | Age: 49
End: 2024-01-22
Payer: COMMERCIAL

## 2024-01-22 NOTE — TELEPHONE ENCOUNTER
1/18/24 visit w Dr Paytons    Routing pt Central Desktop message to provider to advise.    Aydee Franz, RN on 1/22/2024 at 3:55 PM

## 2024-03-01 DIAGNOSIS — N80.9 ENDOMETRIOSIS: ICD-10-CM

## 2024-03-01 RX ORDER — DESOGESTREL AND ETHINYL ESTRADIOL 0.15-0.03
KIT ORAL
Qty: 112 TABLET | Refills: 3 | OUTPATIENT
Start: 2024-03-01

## 2024-03-01 NOTE — TELEPHONE ENCOUNTER
Requested Prescriptions   Pending Prescriptions Disp Refills    ENSKYCE 0.15-30 MG-MCG tablet [Pharmacy Med Name: DESOGEST/ETH EST(ENSKYCE) TABS 28'S 0.15MG/30MCG] 112 tablet 3     Sig: TAKE 1 TABLET DAILY OF ACTIVE TABLETS ONLY FOR TWO PACKS IN A ROW THEN ON THE SECOND PACK TAKE PLACEBO TABLETS AND ALLOW MENSES       Contraceptives Protocol Passed - 3/1/2024 12:16 AM        Passed - Patient is not a current smoker if age is 35 or older        Passed - Recent (12 mo) or future (30 days) visit within the authorizing provider's specialty     The patient must have completed an in-person or virtual visit within the past 12 months or has a future visit scheduled within the next 90 days with the authorizing provider s specialty.  Urgent care and e-visits do not quality as an office visit for this protocol.          Passed - Medication is active on med list        Passed - No active pregnancy on record        Passed - No positive pregnancy test in past 12 months           Last Written Prescription Date:  1/18/24  Last Fill Quantity: 112,  # refills: 0   Last office visit: 1/18/2024 ; last virtual visit: Visit date not found with prescribing provider:  Rocco   Future Office Visit:      Rx Denied  Chronic endometriosis well controlled with MADAN's. However, upon review of Neuro note, patient diagnosed with migraine w/ visual aura. Discussed inc risks of stroke in this situation and would therefore rec changing from MADAN's. Additionally has hx of thrombophlebitis post IV placement. Reviewed alternative options for menstrual/pain control, progesterone only, lupron, hysterectomy (should one of these fail). Pros/cons discussed. Questions answered.   Has neuro appt coming up. Will discuss and consider options for changes, mirena IUD vs lupron. Would suggest changing off OCP in next 2-3 mo.     Gamaliel Wilkins RN on 3/1/2024 at 9:22 AM

## 2024-11-06 ENCOUNTER — MYC MEDICAL ADVICE (OUTPATIENT)
Dept: FAMILY MEDICINE | Facility: CLINIC | Age: 49
End: 2024-11-06
Payer: COMMERCIAL

## 2024-11-06 ENCOUNTER — VIRTUAL VISIT (OUTPATIENT)
Dept: FAMILY MEDICINE | Facility: CLINIC | Age: 49
End: 2024-11-06
Payer: COMMERCIAL

## 2024-11-06 DIAGNOSIS — U07.1 COVID-19 VIRUS INFECTION: Primary | ICD-10-CM

## 2024-11-06 PROCEDURE — 99441 PR PHYSICIAN TELEPHONE EVALUATION 5-10 MIN: CPT | Mod: 93 | Performed by: PHYSICIAN ASSISTANT

## 2024-11-06 NOTE — PATIENT INSTRUCTIONS
The FDA has authorized the emergency use of certain medications for patients who are at high risk for progression to severe illness or death from COVID 19. These medications are investigational, and therefore, information is limited as they are still being studied.        COVID-19 Outpatient Treatments    Important: You can NOT be prescribed Molnupiravir or PAXLOVID if you will start taking the medicine more than 5 days after your symptoms have started.      PAXLOVID: https://www.fda.gov/media/421267/download      Please isolate according to CDC guidelines:  https://www.cdc.gov/coronavirus/2019-ncov/your-health/quarantine-isolation.html      PAXLOVID (nimatrelvir/ritonavir)  How it works  Two medicines (nirmatrelvir and ritonavir) are taken together. They stop the virus from growing. Less amount of virus is easier for your body to fight.  Benefits  Lowers risk of hospitalization and death from COVID-19.  How to take  Medicine comes in a daily container with both medicine tablets. Take by mouth twice daily (once in the morning, once at night) for 5 days.  The number of tablets to take varies by patient.  Don't chew or break capsules. Swallow hole.  When to take  Take as soon as possible after positive COVID-19 test result, and within 5 days of your first symptoms.  Who can take it  Patients must be 18 years or older, weigh at least 88 pounds and have tested positive for COVID-19.  Possible side effects  Can cause altered sense of taste, diarhea (loose, watery stools), high blood pressure, muscle aches.  Medication interactions  Several medicines may interact with PAXLOVID and may cause serious side effects.  Tell your care team about all the medicines you take, including prescription and over-the-counter medicines, vitamins and herbal supplements.  Your provider will review your medicines to make sure that you can safely take PAXLOVID.  Cautions  PAXLOVID is not recommended for patients with severe kidney or liver  disease. If you have mild kidney disease, the dose may need to be changed.  If you are pregnant or breastfeeding, talk to your care team about your options.  If you are sexually active, use effective birth control while taking PAXLOVID.          For informational purposes only. Not to replace the advice of your health care provider. Copyright   2022 [facility]. All rights reserved. Clinically reviewed by May Orellana. [employer] [id number] - 01/22.

## 2024-11-06 NOTE — TELEPHONE ENCOUNTER
Patient was given appointment since she has not been seen by PCP in one year.       Libra Garcia RN  [facility]

## 2024-11-06 NOTE — PROGRESS NOTES
Kaila is a 49 year old who is being evaluated via a billable telephone visit.    How would you like to obtain your AVS? MyChart  If the video visit is dropped, the invitation should be resent by: Text to cell phone: [phone]  Will anyone else be joining your video visit? No  Originating Location (pt. Location): Home    Distant Location (provider location):  On-site    Assessment and Plan:     (U07.1) COVID-19 virus infection  (primary encounter diagnosis)  Comment: onset 2 days ago, has had body aches, congestion, cough, fever, denies dyspnea, chest pain, would like paxlovid, has tolerated well in the past  Plan: nirmatrelvir and ritonavir (PAXLOVID) 300         mg/100 mg therapy pack        Discussed paxlovid MOA, dosing, possible SE and drug interactions  No etoh while on paxlovid  To ED if any yellowing of eyes or skin or if severe covid symptoms which were discussed today  Self isolate according to CDC guidelines    [facility]     Subjective   Kaila is a 49 year old, presenting for the following health issues:  Cough (Positive Covid)    HPI       COVID-19 Symptom Review  How many days ago did these symptoms start? 2 days. + Covid test this am    Are any of the following symptoms significant for you?  New or worsening difficulty breathing? No  Worsening cough? Yes, it's a dry cough.   Fever or chills? Yes, the highest temperature was 101.6  Headache: YES  Sore throat: YES  Chest pain: No  Diarrhea: YES  Body aches? YES    What treatments has patient tried? Acetaminophen, Nonsteroidals   Does patient live in a nursing home, group home, or shelter? No  Does patient have a way to get food/medications during quarantined? Yes, I have a friend or family member who can help me.    Kaila is seeing me via phone visit for covid   Onset two days ago  She has had fever/chills, body aches, congestion, cough, sore throat, nausea, diarrhea   She denies chest pain, shortness of  breath, leg swelling       Objective           Vitals:  No vitals were obtained today due to virtual visit.    Physical Exam   General: Alert and no distress //Respiratory: No audible wheeze, cough, or shortness of breath // Psychiatric:  Appropriate affect, tone, and pace of words            Phone call duration: 8 minutes  Signed Electronically by: June Chao PA-C

## 2024-12-19 ENCOUNTER — PATIENT OUTREACH (OUTPATIENT)
Dept: CARE COORDINATION | Facility: CLINIC | Age: 49
End: 2024-12-19
Payer: COMMERCIAL

## 2025-01-02 ENCOUNTER — PATIENT OUTREACH (OUTPATIENT)
Dept: CARE COORDINATION | Facility: CLINIC | Age: 50
End: 2025-01-02
Payer: COMMERCIAL

## 2025-02-23 ENCOUNTER — HEALTH MAINTENANCE LETTER (OUTPATIENT)
Age: 50
End: 2025-02-23

## 2025-02-25 ENCOUNTER — HOSPITAL ENCOUNTER (EMERGENCY)
Facility: CLINIC | Age: 50
Discharge: HOME | End: 2025-02-26
Attending: EMERGENCY MEDICINE | Admitting: EMERGENCY MEDICINE
Payer: COMMERCIAL

## 2025-02-25 ENCOUNTER — OFFICE VISIT (OUTPATIENT)
Dept: URGENT CARE | Facility: URGENT CARE | Age: 50
End: 2025-02-25
Payer: COMMERCIAL

## 2025-02-25 VITALS
OXYGEN SATURATION: 97 % | HEART RATE: 89 BPM | DIASTOLIC BLOOD PRESSURE: 85 MMHG | TEMPERATURE: 98.6 F | WEIGHT: 199 LBS | BODY MASS INDEX: 33.12 KG/M2 | SYSTOLIC BLOOD PRESSURE: 125 MMHG

## 2025-02-25 DIAGNOSIS — R10.32 LLQ ABDOMINAL PAIN: Primary | ICD-10-CM

## 2025-02-25 DIAGNOSIS — R11.0 NAUSEA: ICD-10-CM

## 2025-02-25 DIAGNOSIS — R10.13 EPIGASTRIC PAIN: ICD-10-CM

## 2025-02-25 DIAGNOSIS — K82.4 GALLBLADDER POLYP: ICD-10-CM

## 2025-02-25 LAB
ALBUMIN SERPL BCG-MCNC: 4.2 G/DL (ref 3.5–5.2)
ALP SERPL-CCNC: 90 U/L (ref 40–150)
ALT SERPL W P-5'-P-CCNC: 7 U/L (ref 0–50)
ANION GAP SERPL CALCULATED.3IONS-SCNC: 12 MMOL/L (ref 7–15)
AST SERPL W P-5'-P-CCNC: 14 U/L (ref 0–45)
BASOPHILS # BLD AUTO: 0 10E3/UL (ref 0–0.2)
BASOPHILS NFR BLD AUTO: 0 %
BILIRUB SERPL-MCNC: 0.4 MG/DL
BUN SERPL-MCNC: 13 MG/DL (ref 6–20)
CALCIUM SERPL-MCNC: 8.7 MG/DL (ref 8.8–10.4)
CHLORIDE SERPL-SCNC: 101 MMOL/L (ref 98–107)
CREAT SERPL-MCNC: 0.76 MG/DL (ref 0.51–0.95)
EGFRCR SERPLBLD CKD-EPI 2021: >90 ML/MIN/1.73M2
EOSINOPHIL # BLD AUTO: 0.1 10E3/UL (ref 0–0.7)
EOSINOPHIL NFR BLD AUTO: 2 %
ERYTHROCYTE [DISTWIDTH] IN BLOOD BY AUTOMATED COUNT: 13.3 % (ref 10–15)
GLUCOSE SERPL-MCNC: 91 MG/DL (ref 70–99)
HCO3 SERPL-SCNC: 22 MMOL/L (ref 22–29)
HCT VFR BLD AUTO: 46.7 % (ref 35–47)
HGB BLD-MCNC: 15.2 G/DL (ref 11.7–15.7)
HOLD SPECIMEN: NORMAL
IMM GRANULOCYTES # BLD: 0 10E3/UL
IMM GRANULOCYTES NFR BLD: 0 %
LYMPHOCYTES # BLD AUTO: 2.2 10E3/UL (ref 0.8–5.3)
LYMPHOCYTES NFR BLD AUTO: 30 %
MCH RBC QN AUTO: 27.2 PG (ref 26.5–33)
MCHC RBC AUTO-ENTMCNC: 32.5 G/DL (ref 31.5–36.5)
MCV RBC AUTO: 84 FL (ref 78–100)
MONOCYTES # BLD AUTO: 0.7 10E3/UL (ref 0–1.3)
MONOCYTES NFR BLD AUTO: 9 %
NEUTROPHILS # BLD AUTO: 4.2 10E3/UL (ref 1.6–8.3)
NEUTROPHILS NFR BLD AUTO: 58 %
NRBC # BLD AUTO: 0 10E3/UL
NRBC BLD AUTO-RTO: 0 /100
PLATELET # BLD AUTO: 302 10E3/UL (ref 150–450)
POTASSIUM SERPL-SCNC: 3.7 MMOL/L (ref 3.4–5.3)
PROT SERPL-MCNC: 6.8 G/DL (ref 6.4–8.3)
RBC # BLD AUTO: 5.59 10E6/UL (ref 3.8–5.2)
SODIUM SERPL-SCNC: 135 MMOL/L (ref 135–145)
TROPONIN T SERPL HS-MCNC: <6 NG/L
WBC # BLD AUTO: 7.3 10E3/UL (ref 4–11)

## 2025-02-25 PROCEDURE — 36415 COLL VENOUS BLD VENIPUNCTURE: CPT | Performed by: EMERGENCY MEDICINE

## 2025-02-25 PROCEDURE — 80053 COMPREHEN METABOLIC PANEL: CPT | Performed by: EMERGENCY MEDICINE

## 2025-02-25 PROCEDURE — 99285 EMERGENCY DEPT VISIT HI MDM: CPT | Mod: 25

## 2025-02-25 PROCEDURE — 84484 ASSAY OF TROPONIN QUANT: CPT | Performed by: EMERGENCY MEDICINE

## 2025-02-25 PROCEDURE — 3074F SYST BP LT 130 MM HG: CPT

## 2025-02-25 PROCEDURE — 99214 OFFICE O/P EST MOD 30 MIN: CPT

## 2025-02-25 PROCEDURE — 85025 COMPLETE CBC W/AUTO DIFF WBC: CPT | Performed by: EMERGENCY MEDICINE

## 2025-02-25 PROCEDURE — 80048 BASIC METABOLIC PNL TOTAL CA: CPT | Performed by: EMERGENCY MEDICINE

## 2025-02-25 PROCEDURE — 3079F DIAST BP 80-89 MM HG: CPT

## 2025-02-25 PROCEDURE — 82435 ASSAY OF BLOOD CHLORIDE: CPT | Performed by: EMERGENCY MEDICINE

## 2025-02-25 PROCEDURE — 85041 AUTOMATED RBC COUNT: CPT | Performed by: EMERGENCY MEDICINE

## 2025-02-25 ASSESSMENT — ACTIVITIES OF DAILY LIVING (ADL)
ADLS_ACUITY_SCORE: 41

## 2025-02-25 ASSESSMENT — COLUMBIA-SUICIDE SEVERITY RATING SCALE - C-SSRS
6. HAVE YOU EVER DONE ANYTHING, STARTED TO DO ANYTHING, OR PREPARED TO DO ANYTHING TO END YOUR LIFE?: NO
1. IN THE PAST MONTH, HAVE YOU WISHED YOU WERE DEAD OR WISHED YOU COULD GO TO SLEEP AND NOT WAKE UP?: NO
2. HAVE YOU ACTUALLY HAD ANY THOUGHTS OF KILLING YOURSELF IN THE PAST MONTH?: NO

## 2025-02-26 ENCOUNTER — MYC MEDICAL ADVICE (OUTPATIENT)
Dept: FAMILY MEDICINE | Facility: CLINIC | Age: 50
End: 2025-02-26

## 2025-02-26 VITALS
RESPIRATION RATE: 16 BRPM | OXYGEN SATURATION: 99 % | TEMPERATURE: 97.8 F | HEART RATE: 76 BPM | SYSTOLIC BLOOD PRESSURE: 128 MMHG | BODY MASS INDEX: 31.34 KG/M2 | WEIGHT: 195 LBS | DIASTOLIC BLOOD PRESSURE: 67 MMHG | HEIGHT: 66 IN

## 2025-02-26 LAB
ALBUMIN UR-MCNC: NEGATIVE MG/DL
APPEARANCE UR: CLEAR
BILIRUB UR QL STRIP: NEGATIVE
COLOR UR AUTO: ABNORMAL
GLUCOSE UR STRIP-MCNC: NEGATIVE MG/DL
HGB UR QL STRIP: ABNORMAL
KETONES UR STRIP-MCNC: 10 MG/DL
LEUKOCYTE ESTERASE UR QL STRIP: ABNORMAL
MUCOUS THREADS #/AREA URNS LPF: PRESENT /LPF
NITRATE UR QL: NEGATIVE
PH UR STRIP: 5 [PH] (ref 5–7)
RBC URINE: 2 /HPF
SP GR UR STRIP: 1.01 (ref 1–1.03)
SQUAMOUS EPITHELIAL: 3 /HPF
UROBILINOGEN UR STRIP-MCNC: NORMAL MG/DL
WBC URINE: 3 /HPF

## 2025-02-26 PROCEDURE — 81001 URINALYSIS AUTO W/SCOPE: CPT | Performed by: EMERGENCY MEDICINE

## 2025-02-26 PROCEDURE — 250N000011 HC RX IP 250 OP 636: Performed by: EMERGENCY MEDICINE

## 2025-02-26 PROCEDURE — 250N000009 HC RX 250: Performed by: EMERGENCY MEDICINE

## 2025-02-26 PROCEDURE — 96374 THER/PROPH/DIAG INJ IV PUSH: CPT | Mod: 59

## 2025-02-26 RX ORDER — IOPAMIDOL 755 MG/ML
98 INJECTION, SOLUTION INTRAVASCULAR ONCE
Status: DISCONTINUED | OUTPATIENT
Start: 2025-02-26 | End: 2025-02-26 | Stop reason: HOSPADM

## 2025-02-26 RX ORDER — FAMOTIDINE 40 MG/1
40 TABLET, FILM COATED ORAL 2 TIMES DAILY PRN
Qty: 60 TABLET | Refills: 0 | Status: SHIPPED | OUTPATIENT
Start: 2025-02-26 | End: 2025-03-28

## 2025-02-26 RX ORDER — MORPHINE SULFATE 4 MG/ML
6 INJECTION, SOLUTION INTRAMUSCULAR; INTRAVENOUS
Status: COMPLETED | OUTPATIENT
Start: 2025-02-26 | End: 2025-02-26

## 2025-02-26 RX ORDER — ONDANSETRON 2 MG/ML
4 INJECTION INTRAMUSCULAR; INTRAVENOUS EVERY 30 MIN PRN
Status: DISCONTINUED | OUTPATIENT
Start: 2025-02-26 | End: 2025-02-26 | Stop reason: HOSPADM

## 2025-02-26 RX ORDER — ONDANSETRON 4 MG/1
4 TABLET, ORALLY DISINTEGRATING ORAL EVERY 8 HOURS PRN
Qty: 10 TABLET | Refills: 0 | Status: SHIPPED | OUTPATIENT
Start: 2025-02-26 | End: 2025-03-01

## 2025-02-26 RX ORDER — IOPAMIDOL 755 MG/ML
98 INJECTION, SOLUTION INTRAVASCULAR ONCE
Status: COMPLETED | OUTPATIENT
Start: 2025-02-26 | End: 2025-02-26

## 2025-02-26 RX ORDER — SUCRALFATE ORAL 1 G/10ML
1 SUSPENSION ORAL 2 TIMES DAILY PRN
Qty: 414 ML | Refills: 0 | Status: SHIPPED | OUTPATIENT
Start: 2025-02-26 | End: 2025-03-12

## 2025-02-26 RX ADMIN — SODIUM CHLORIDE 65 ML: 9 INJECTION, SOLUTION INTRAVENOUS at 00:51

## 2025-02-26 RX ADMIN — MORPHINE SULFATE 6 MG: 4 INJECTION, SOLUTION INTRAMUSCULAR; INTRAVENOUS at 00:28

## 2025-02-26 RX ADMIN — IOPAMIDOL 98 ML: 755 INJECTION, SOLUTION INTRAVENOUS at 00:50

## 2025-02-26 RX ADMIN — ONDANSETRON 4 MG: 2 INJECTION, SOLUTION INTRAMUSCULAR; INTRAVENOUS at 00:21

## 2025-02-26 ASSESSMENT — ACTIVITIES OF DAILY LIVING (ADL)
ADLS_ACUITY_SCORE: 47

## 2025-02-26 NOTE — DISCHARGE INSTRUCTIONS
As we discussed, no clear cause of your symptoms was found here today, although your abdomen is benign your CT scan and ultrasound are reassuring apart from the incidental finding of gallbladder polyps.  Please come back to the ER immediately with any other concerns you have and to use the medication that we have given you to help with your symptoms including the cervical fate and antacids and nausea meds as needed.  Please do avoid any NSAIDs like Advil or ibuprofen, and do be certain to at least attempt a phone call in the next day or 2 to see if you can expedite a prompt appointment with your general practitioner or ask for a referral to a GI doctor as you will need an EGD as your next step.  Come back with any other concerns you have.

## 2025-02-26 NOTE — PROGRESS NOTES
"Assessment & Plan     LLQ abdominal pain  This is a 49-year-old with past medical history significant for endometriosis (recently discontinued OCP due to migraine with aura) who presents for acute onset left-sided abdominal pain since Sunday.  It is sharp and constant.  Initially felt more left upper quadrant but now is primarily left lower quadrant.  No bloody stools, no fevers, no vomiting.  Has had some changes in bowel movements but is still having them regularly.  She is at the end of her period today making ectopic pregnancy unlikely. We discussed several options, however given the lack of availability of ultrasound or CT imaging [facility] and given the severity of her pain and increased risk of something like ovarian torsion with her history of endometriosis I do think she requires more urgent evaluation with imaging and we agreed that the best next course would be to present to the emergency department [facility].  She is in agreement.  She is vitally stable and nontoxic so safe for her partner to transport her there.    Return To [facility].    Donaldo Peter MD  [facility] OLIVERIO Bright is a 49 year old female who presents to clinic today for the following health issues:  Chief Complaint   Patient presents with    Abdominal Pain     Left sided abdominal pain started Sunday.  She felt a weird shift in her stomach and something dropped and now the pain is in her lower left abdomen now. She is not eating as she usually would. She is really gassy and passing gas. She is also constipated. Some diarrhea. She has some trouble breathing deeply. She tried tums. Has trouble sitting and laying flat laying on a heating pad feels best.     LUQ abdominal pain starting Sunday  Felt it was stomach bug at first, changed to simple foods  Pain is sharp - initially was just with eating no it's constant unless she lays flat  Pain is way worse with eating  Feels \"gassy\" and burpy but minimal " "PO intake  Felt like something shifted now pain is lower too  Typically 3 soft BM per day - last 3 days became very solid 1-2 times per day then after had some watery/loose stool  No hematochezia or melena  Has had some nausea, no vomiting  Loss of appetite  No fever - has felt \"cold\" but no rigors  LMP - Sunday was last day - normal period  No abdominal surgeries  No sick contacts  Tried Tums and takes pepcid BID - no difference  No back pain  No dysuria or urinary changes  No trauma  No new meds  In process of decreasing topamax - takes for migraine HA  Staying hydrated  Does have hx endometriosis        Reviewed colonoscopy 06/2023 - unremarkable except for internal hemorrhoids      Objective    /85   Pulse 89   Temp 98.6  F (37  C)   Wt 90.3 kg (199 lb)   SpO2 97%   BMI 33.12 kg/m    Physical Exam  Constitutional:       Appearance: Normal appearance. She is not ill-appearing, toxic-appearing or diaphoretic.   Eyes:      Conjunctiva/sclera: Conjunctivae normal.   Cardiovascular:      Rate and Rhythm: Normal rate and regular rhythm.      Heart sounds: Normal heart sounds.   Pulmonary:      Effort: Pulmonary effort is normal.      Breath sounds: Normal breath sounds.   Abdominal:      General: Bowel sounds are decreased.      Palpations: Abdomen is soft.      Tenderness: There is abdominal tenderness in the left upper quadrant and left lower quadrant. There is no guarding or rebound. Negative signs include Be's sign.   Musculoskeletal:         General: Normal range of motion.      Cervical back: Normal range of motion and neck supple.   Skin:     General: Skin is warm and dry.   Neurological:      General: No focal deficit present.      Mental Status: She is alert.            "

## 2025-02-26 NOTE — ED TRIAGE NOTES
Pt c/o upper left abd pain starting last Sunday. Pt endorses N&V&D with constipations. Pt states the pain worsens when she eats. Pt states BM are regular. Pt was seen at  today.      Triage Assessment (Adult)       Row Name 02/25/25 2020          Triage Assessment    Airway WDL WDL        Respiratory WDL    Respiratory WDL WDL        Skin Circulation/Temperature WDL    Skin Circulation/Temperature WDL WDL        Cardiac WDL    Cardiac WDL WDL        Peripheral/Neurovascular WDL    Peripheral Neurovascular WDL WDL        Cognitive/Neuro/Behavioral WDL    Cognitive/Neuro/Behavioral WDL WDL

## 2025-02-26 NOTE — ED NOTES
Writer ambulated with pt to restroom outside of Behavioral Health. Pt ambulated independently, with writer at stand by.

## 2025-02-26 NOTE — ED PROVIDER NOTES
Emergency Department Note      History of Present Illness     Chief Complaint  Abdominal Pain    HPI  May Aldana is a 49 year old female who presents to the emergency room with left upper quadrant and left lower quadrant abdominal pain that started today.  Initially it was left lower quadrant but is now migrated up into her left upper quadrant, and she went to urgent care with state here for further evaluation.  She denies any vomiting but does endorse slight nausea and 1 small episode of diarrhea.  She has a history of endometriosis, and initially with the lower abdominal pain the urgent care provider was concerned about torsion.  Patient denies any blood in her stool no vaginal bleeding, no numbness or tingling, and is not really sure of anything that makes the pain better or worse.  Does note that pain has gotten worse while she is waiting here in the triage lobby       Independent Historian  yes patient's  is at the bedside and confirms above history    Review of External Notes  Yes I have reviewed the patient's office visit from earlier today with the patient was seen for left lower quadrant pain at the urgent care setting      Past Medical History   Medical History and Problem List  Past Medical History:   Diagnosis Date    Immunodeficiency     Kidney infection     Low back pain     Migraine        Medications  diphenhydrAMINE (BENADRYL) 25 MG capsule  famotidine (PEPCID) 20 MG tablet  fexofenadine (ALLEGRA) 180 MG tablet  Fish Oil-Cholecalciferol (FISH OIL + D3) 3324-6731 MG-UNIT CAPS  montelukast (SINGULAIR) 10 MG tablet  naproxen sodium (ANAPROX) 550 MG tablet  naratriptan (AMERGE) 2.5 MG tablet  promethazine (PHENERGAN) 25 MG tablet  topiramate (TOPAMAX) 100 MG tablet        Surgical History   Past Surgical History:   Procedure Laterality Date    COLONOSCOPY N/A 6/12/2023    Procedure: Colonoscopy;  Surgeon: Tej Galloway MD;  Location:  GI    TONSILLECTOMY & ADENOIDECTOMY   "1987         Physical Exam   Patient Vitals for the past 24 hrs:   BP Temp Temp src Pulse Resp SpO2 Height Weight   02/25/25 2016 124/77 97.3  F (36.3  C) Temporal 90 16 100 % 1.676 m (5' 6\") 88.5 kg (195 lb)       Physical Exam  Vitals: reviewed by me  General: Pt seen on [facility], cooperative, and alert to conversation  Eyes: Tracking well, clear conjunctiva BL  ENT: MMM, midline trachea.   Lungs: No tachypnea, no accessory muscle use. No respiratory distress.   CV: Rate as above  Abd: Soft,  does have significant tenderness to the left upper quadrant and epigastric area, no lower abdominal tenderness to palpation noted.  No CVA tenderness bilaterally.  No guarding or rebound noted in the abdomen.  MSK: no joint effusion.  No evidence of trauma  Skin: No rash  Neuro: Clear speech and no facial droop.  Psych: Not RIS, no e/o AH/VH      Diagnostics   Lab Results   Labs Ordered and Resulted from Time of ED Arrival to Time of ED Departure   COMPREHENSIVE METABOLIC PANEL - Abnormal       Result Value    Sodium 135      Potassium 3.7      Carbon Dioxide (CO2) 22      Anion Gap 12      Urea Nitrogen 13.0      Creatinine 0.76      GFR Estimate >90      Calcium 8.7 (*)     Chloride 101      Glucose 91      Alkaline Phosphatase 90      AST 14      ALT 7      Protein Total 6.8      Albumin 4.2      Bilirubin Total 0.4     CBC WITH PLATELETS AND DIFFERENTIAL - Abnormal    WBC Count 7.3      RBC Count 5.59 (*)     Hemoglobin 15.2      Hematocrit 46.7      MCV 84      MCH 27.2      MCHC 32.5      RDW 13.3      Platelet Count 302      % Neutrophils 58      % Lymphocytes 30      % Monocytes 9      % Eosinophils 2      % Basophils 0      % Immature Granulocytes 0      NRBCs per 100 WBC 0      Absolute Neutrophils 4.2      Absolute Lymphocytes 2.2      Absolute Monocytes 0.7      Absolute Eosinophils 0.1      Absolute Basophils 0.0      Absolute Immature Granulocytes 0.0      Absolute NRBCs 0.0     ROUTINE UA WITH " MICROSCOPIC REFLEX TO CULTURE - Abnormal    Color Urine Light Yellow      Appearance Urine Clear      Glucose Urine Negative      Bilirubin Urine Negative      Ketones Urine 10 (*)     Specific Gravity Urine 1.015      Blood Urine Small (*)     pH Urine 5.0      Protein Albumin Urine Negative      Urobilinogen Urine Normal      Nitrite Urine Negative      Leukocyte Esterase Urine Small (*)     Mucus Urine Present (*)     RBC Urine 2      WBC Urine 3      Squamous Epithelials Urine 3 (*)    TROPONIN T, HIGH SENSITIVITY - Normal    Troponin T, High Sensitivity <6         Imaging  US Abdomen Limited   Final Result   IMPRESSION:   1.  Small volume of gallbladder sludge. No evidence of cholecystitis or biliary obstruction.   2.  Several gallbladder polyps are observed, the largest measuring 12 mm. Follow-up ultrasound in 6 months versus surgical consultation suggested as per the reference below.      Gallbladder polyp 10-14 mm: Follow-up US at 6, 12, 24, 36 months vs surgical consult. If on follow-up increase of >= 4 mm in <= 12 months - recommend surgical consult. If decrease of >= 4 mm - stop following.      REFERENCE:   Management of Incidentally Detected Gallbladder Polyps: Society of Radiologists in Ultrasound Consensus Conference Recommendations. Radiology 2022; 000:1-12   1.              CT Abdomen Pelvis w Contrast   Final Result   IMPRESSION:   1.  No evidence of acute pathology in the abdomen and pelvis.   2.  Cholelithiasis without CT evidence of acute cholecystitis.   3.  Fibroid uterus.            ED Course      Medications Administered   Medications   ondansetron (ZOFRAN) injection 4 mg (4 mg Intravenous $Given 2/26/25 0021)   iopamidol (ISOVUE-370) solution 98 mL (has no administration in time range)   sodium chloride 0.9 % bag 500mL for CT scan flush use (has no administration in time range)   morphine (PF) injection 6 mg (6 mg Intravenous $Given 2/26/25 0028)   iopamidol (ISOVUE-370) solution 98 mL (98  mLs Intravenous $Given 2/26/25 0050)   sodium chloride 0.9 % bag 500mL for CT scan flush use (65 mLs Intravenous $Given 2/26/25 0051)              Medical Decision Making / Diagnosis         MDM  This is a very pleasant 49-year-old female who presents to the emergency room with epigastric discomfort and nausea without vomiting.  Her abdomen is benign but she does clearly have some epigastric tenderness to palpation, and I do not detect any lower pelvic tenderness to palpation on my exam.  She states that she does not have any pain in this area anymore and it is all epigastric.  Thorough workup was done with a wide differential and thankfully her CT scan is unremarkable, and because the pain was clearly epigastric I did do an ultrasound to evaluate for gallstones as well and thankfully there are no gallstones however she does have incidental findings of polyps which we discussed.  I do not think that a pelvic ultrasound would be particularly helpful at this time given the very clear postprandial nature of the pain and how it is well above the umbilicus.  Using shared decision making we discussed this and I do think that the patient is stable to follow in the outpatient setting with supportive measures like antacids and cervical fate and I do think that the next best step for her would be to get an EGD.  Highly reliable appearing patient and spouse, red flags for when to come back to the ER were discussed in detail.     ICD-10 Codes:    ICD-10-CM    1. Epigastric pain  R10.13       2. Nausea  R11.0       3. Gallbladder polyp  K82.4              Discharge Medications  New Prescriptions    FAMOTIDINE (PEPCID) 40 MG TABLET    Take 1 tablet (40 mg) by mouth 2 times daily as needed for heartburn.    ONDANSETRON (ZOFRAN ODT) 4 MG ODT TAB    Take 1 tablet (4 mg) by mouth every 8 hours as needed.    SUCRALFATE (CARAFATE) 1 GM/10ML SUSPENSION    Take 10 mLs (1 g) by mouth 2 times daily as needed.                  Brian  Jey Webb MD  02/26/25 [id number]

## 2025-02-28 ENCOUNTER — OFFICE VISIT (OUTPATIENT)
Dept: FAMILY MEDICINE | Facility: CLINIC | Age: 50
End: 2025-02-28
Payer: COMMERCIAL

## 2025-02-28 VITALS
BODY MASS INDEX: 31.5 KG/M2 | HEIGHT: 66 IN | HEART RATE: 86 BPM | SYSTOLIC BLOOD PRESSURE: 120 MMHG | OXYGEN SATURATION: 95 % | TEMPERATURE: 98.7 F | WEIGHT: 196 LBS | RESPIRATION RATE: 16 BRPM | DIASTOLIC BLOOD PRESSURE: 65 MMHG

## 2025-02-28 DIAGNOSIS — K80.20 GALLSTONES: Primary | ICD-10-CM

## 2025-02-28 DIAGNOSIS — K21.9 GASTROESOPHAGEAL REFLUX DISEASE WITHOUT ESOPHAGITIS: ICD-10-CM

## 2025-02-28 DIAGNOSIS — K52.9 GASTROENTERITIS: ICD-10-CM

## 2025-02-28 DIAGNOSIS — R10.9 LEFT SIDED ABDOMINAL PAIN: ICD-10-CM

## 2025-02-28 PROCEDURE — G2211 COMPLEX E/M VISIT ADD ON: HCPCS | Performed by: INTERNAL MEDICINE

## 2025-02-28 PROCEDURE — 3074F SYST BP LT 130 MM HG: CPT | Performed by: INTERNAL MEDICINE

## 2025-02-28 PROCEDURE — 99213 OFFICE O/P EST LOW 20 MIN: CPT | Performed by: INTERNAL MEDICINE

## 2025-02-28 PROCEDURE — 1126F AMNT PAIN NOTED NONE PRSNT: CPT | Performed by: INTERNAL MEDICINE

## 2025-02-28 PROCEDURE — 3078F DIAST BP <80 MM HG: CPT | Performed by: INTERNAL MEDICINE

## 2025-02-28 ASSESSMENT — PAIN SCALES - GENERAL: PAINLEVEL_OUTOF10: NO PAIN (0)

## 2025-02-28 NOTE — PROGRESS NOTES
"  Assessment & Plan     Gallstones  - recent incidental finding on CT abdomen, currently asymptomatic  - Adult Gen Surg  Referral    Gastroesophageal reflux disease without esophagitis  Left sided abdominal pain  - stable, continue current therapy  - Adult GI  Referral - Procedure Only for diagnostic upper EGD endoscopy for further evaluation    Diarrhea  Gastroenteritis  - symptoms gradually improving  - OTC imodium medication  - Enteric Bacteria and Virus Panel by WILLIAMS Stool  - Enteric Bacteria and Virus Panel by WILLIAMS Stool        BMI  Estimated body mass index is 31.64 kg/m  as calculated from the following:    Height as of this encounter: 1.676 m (5' 6\").    Weight as of this encounter: 88.9 kg (196 lb).   Weight management plan: Discussed healthy diet and exercise guidelines      FUTURE APPOINTMENTS:       - Follow-up visit in 1 month    Clarisa Bright is a 49 year old, presenting for the following health issues:  Abdominal Pain    History of Present Illness       Reason for visit:  Abdominal pain  Symptom onset:  3-7 days ago  Symptoms include:  Sharp pain gas bloating no appetite  Symptom intensity:  Moderate  Symptom progression:  Staying the same  Had these symptoms before:  No  What makes it worse:  Eating and sitting  What makes it better:  Laying flat not eating   She is taking medications regularly.        Current Outpatient Medications   Medication Sig Dispense Refill    diphenhydrAMINE (BENADRYL) 25 MG capsule Take 25 mg by mouth every 6 hours as needed for itching or allergies      famotidine (PEPCID) 20 MG tablet Take 1 tablet (20 mg) by mouth 2 times daily      famotidine (PEPCID) 40 MG tablet Take 1 tablet (40 mg) by mouth 2 times daily as needed for heartburn. 60 tablet 0    fexofenadine (ALLEGRA) 180 MG tablet Take 180 mg by mouth 2 times daily      Fish Oil-Cholecalciferol (FISH OIL + D3) 5559-1369 MG-UNIT CAPS Take 2 capsules by mouth daily      montelukast (SINGULAIR) 10 " MG tablet Take 10 mg by mouth At Bedtime      naproxen sodium (ANAPROX) 550 MG tablet Take 550 mg by mouth as needed.      naratriptan (AMERGE) 2.5 MG tablet Take 2.5 mg by mouth as needed.      ondansetron (ZOFRAN ODT) 4 MG ODT tab Take 1 tablet (4 mg) by mouth every 8 hours as needed. 10 tablet 0    promethazine (PHENERGAN) 25 MG tablet Take 25 mg by mouth as needed.      sucralfate (CARAFATE) 1 GM/10ML suspension Take 10 mLs (1 g) by mouth 2 times daily as needed. 414 mL 0    topiramate (TOPAMAX) 100 MG tablet Take 150 mg by mouth daily       No current facility-administered medications for this visit.     Past Medical History:   Diagnosis Date    Immunodeficiency     Histamine disorder    Kidney infection     Low back pain     Migraine      Social History     Socioeconomic History    Marital status:      Spouse name: Not on file    Number of children: Not on file    Years of education: Not on file    Highest education level: Not on file   Occupational History    Not on file   Tobacco Use    Smoking status: Never    Smokeless tobacco: Never   Vaping Use    Vaping status: Never Used   Substance and Sexual Activity    Alcohol use: Yes     Comment: 1-2 drinks per week     Drug use: No    Sexual activity: Yes     Partners: Male     Birth control/protection: Condom, Pill   Other Topics Concern    Parent/sibling w/ CABG, MI or angioplasty before 65F 55M? No   Social History Narrative    Not on file     Social Drivers of Health     Financial Resource Strain: Low Risk  (4/11/2023)    [facility]    Overall Financial Resource Strain (CARDIA)     Difficulty of Paying Living Expenses: Not hard at all   Food Insecurity: No Food Insecurity (1/30/2025)    Received from [facility]    Hunger Vital Sign     Worried About Running Out of Food in the Last Year: Never true     Ran Out of Food in the Last Year: Never true   Transportation Needs: No Transportation Needs (1/30/2025)    Received from  "[facility]    PRAPARE - Transportation     Lack of Transportation (Medical): No     Lack of Transportation (Non-Medical): No   Physical Activity: Insufficiently Active (1/30/2025)    Received from [facility]    Exercise Vital Sign     Days of Exercise per Week: 1 day     Minutes of Exercise per Session: 30 min   Stress: No Stress Concern Present (4/11/2023)    Received from [facility], [facility]    [employer] - Occupational Stress Questionnaire     Feeling of Stress : Only a little   Social Connections: Moderately Isolated (4/11/2023)    Received from [facility], [facility]    Social Connection and Isolation Panel [NHANES]     Frequency of Communication with Friends and Family: More than three times a week     Frequency of Social Gatherings with Friends and Family: Twice a week     Attends Religion Services: Never     Active Member of Clubs or Organizations: No     Attends Club or Organization Meetings: Never     Marital Status:    Interpersonal Safety: Low Risk  (2/28/2025)    Interpersonal Safety     Do you feel physically and emotionally safe where you currently live?: Yes     Within the past 12 months, have you been hit, slapped, kicked or otherwise physically hurt by someone?: No     Within the past 12 months, have you been humiliated or emotionally abused in other ways by your partner or ex-partner?: No   Housing Stability: Low Risk  (1/30/2025)    Received from [facility]    Housing Stability     What is your living situation today?: I have a steady place to live           Review of Systems  Constitutional, HEENT, cardiovascular, pulmonary, GI, , musculoskeletal, neuro, skin, endocrine and psych systems are negative, except as otherwise noted.      Objective    /65 (BP Location: Left arm, Patient Position: Sitting, Cuff Size: Adult Large)   Pulse 86   Temp 98.7  F (37.1  C) (Temporal)   Resp 16   Ht 1.676 m (5' 6\")   Wt 88.9 kg (196 lb)   SpO2 95%   " BMI 31.64 kg/m    Body mass index is 31.64 kg/m .  Physical Exam   GENERAL: alert and no distress  EYES: Eyes grossly normal to inspection, conjunctivae and sclerae normal  HENT: normocephalic atraumatic  NECK: no asymmetry  RESP: lungs clear to auscultation - no rales, rhonchi or wheezes  CV: regular rate and rhythm, normal S1 S2  ABDOMEN: soft, nontender, left sided abdominal discomfort noted and bowel sounds normal  MS: no gross musculoskeletal defects noted, no edema  SKIN: no suspicious lesions or rashes  NEURO: mentation intact and speech normal  PSYCH: affect normal/bright    Labs reviewed in Epic    The longitudinal plan of care for the diagnosis(es)/condition(s) as documented were addressed during this visit. Due to the added complexity in care, I will continue to support Kaila in the subsequent management and with ongoing continuity of care.          Signed Electronically by: Edna Vasquez MD

## 2025-03-11 ENCOUNTER — PATIENT OUTREACH (OUTPATIENT)
Dept: CARE COORDINATION | Facility: CLINIC | Age: 50
End: 2025-03-11
Payer: COMMERCIAL

## 2025-03-26 ENCOUNTER — HOSPITAL ENCOUNTER (OUTPATIENT)
Facility: CLINIC | Age: 50
Discharge: HOME | End: 2025-03-26
Attending: INTERNAL MEDICINE | Admitting: INTERNAL MEDICINE
Payer: COMMERCIAL

## 2025-03-26 VITALS
DIASTOLIC BLOOD PRESSURE: 64 MMHG | HEART RATE: 78 BPM | BODY MASS INDEX: 30.53 KG/M2 | SYSTOLIC BLOOD PRESSURE: 116 MMHG | OXYGEN SATURATION: 100 % | WEIGHT: 190 LBS | HEIGHT: 66 IN | RESPIRATION RATE: 8 BRPM

## 2025-03-26 DIAGNOSIS — K29.70 ERYTHEMATOUS GASTROPATHY: Primary | ICD-10-CM

## 2025-03-26 LAB — UPPER GI ENDOSCOPY: NORMAL

## 2025-03-26 PROCEDURE — 43239 EGD BIOPSY SINGLE/MULTIPLE: CPT | Performed by: INTERNAL MEDICINE

## 2025-03-26 PROCEDURE — 88305 TISSUE EXAM BY PATHOLOGIST: CPT | Mod: TC | Performed by: INTERNAL MEDICINE

## 2025-03-26 PROCEDURE — 88305 TISSUE EXAM BY PATHOLOGIST: CPT | Mod: 26 | Performed by: PATHOLOGY

## 2025-03-26 PROCEDURE — 250N000011 HC RX IP 250 OP 636: Performed by: INTERNAL MEDICINE

## 2025-03-26 PROCEDURE — 999N000099 HC STATISTIC MODERATE SEDATION < 10 MIN: Performed by: INTERNAL MEDICINE

## 2025-03-26 RX ORDER — FENTANYL CITRATE 50 UG/ML
INJECTION, SOLUTION INTRAMUSCULAR; INTRAVENOUS PRN
Status: DISCONTINUED | OUTPATIENT
Start: 2025-03-26 | End: 2025-03-26 | Stop reason: HOSPADM

## 2025-03-26 RX ORDER — OMEPRAZOLE 20 MG/1
20 CAPSULE, DELAYED RELEASE ORAL DAILY
Qty: 90 CAPSULE | Refills: 3 | Status: SHIPPED | OUTPATIENT
Start: 2025-03-26 | End: 2026-03-26

## 2025-03-26 RX ORDER — DIPHENHYDRAMINE HYDROCHLORIDE 50 MG/ML
INJECTION, SOLUTION INTRAMUSCULAR; INTRAVENOUS PRN
Status: DISCONTINUED | OUTPATIENT
Start: 2025-03-26 | End: 2025-03-26 | Stop reason: HOSPADM

## 2025-03-26 ASSESSMENT — ACTIVITIES OF DAILY LIVING (ADL)
ADLS_ACUITY_SCORE: 41

## 2025-03-26 NOTE — H&P
[facility]  Pre-Endoscopy History and Physical     May Aldana MRN# [id number]   YOB: 1975 Age: 50 year old     Date of Procedure: 3/26/2025  Primary care provider: Edna Vasquez  Type of Endoscopy: esophagogastroduodenoscopy (upper GI endoscopy)  Reason for Procedure: GERD  Type of Anesthesia Anticipated: Moderate Sedation    HPI:    May is a 50 year old female who will be undergoing the above procedure.      A history and physical has been performed. The patient's medications and allergies have been reviewed. The risks and benefits of the procedure and the sedation options and risks were discussed with the patient.  All questions were answered and informed consent was obtained.      She denies a personal or family history of anesthesia complications or bleeding disorders.     Allergies   Allergen Reactions    Seasonal Allergies     Latex Rash        Prior to Admission Medications   Prescriptions Last Dose Informant Patient Reported? Taking?   Fish Oil-Cholecalciferol (FISH OIL + D3) 2589-0572 MG-UNIT CAPS More than a month  Yes Yes   Sig: Take 2 capsules by mouth daily   diphenhydrAMINE (BENADRYL) 25 MG capsule More than a month  Yes Yes   Sig: Take 25 mg by mouth every 6 hours as needed for itching or allergies   famotidine (PEPCID) 20 MG tablet   Yes No   Sig: Take 1 tablet (20 mg) by mouth 2 times daily   famotidine (PEPCID) 40 MG tablet 3/25/2025  No Yes   Sig: Take 1 tablet (40 mg) by mouth 2 times daily as needed for heartburn.   fexofenadine (ALLEGRA) 180 MG tablet 3/25/2025  Yes Yes   Sig: Take 180 mg by mouth 2 times daily   montelukast (SINGULAIR) 10 MG tablet 3/25/2025  Yes Yes   Sig: Take 10 mg by mouth At Bedtime   naproxen sodium (ANAPROX) 550 MG tablet More than a month  Yes Yes   Sig: Take 550 mg by mouth as needed.   naratriptan (AMERGE) 2.5 MG tablet More than a month  Yes Yes   Sig: Take 2.5 mg by mouth as needed.   promethazine  (PHENERGAN) 25 MG tablet More than a month  Yes Yes   Sig: Take 25 mg by mouth as needed.   topiramate (TOPAMAX) 100 MG tablet 3/25/2025  Yes Yes   Sig: Take 150 mg by mouth daily      Facility-Administered Medications: None       Patient Active Problem List   Diagnosis    CARDIOVASCULAR SCREENING; LDL GOAL LESS THAN 160    Generalized pruritus    Allergic state        Past Medical History:   Diagnosis Date    Immunodeficiency     Histamine disorder    Kidney infection     Low back pain     Migraine         Past Surgical History:   Procedure Laterality Date    COLONOSCOPY N/A 6/12/2023    Procedure: Colonoscopy;  Surgeon: Tej Galloway MD;  Location:  GI    TONSILLECTOMY & ADENOIDECTOMY  1987       Social History     Tobacco Use    Smoking status: Never    Smokeless tobacco: Never   Substance Use Topics    Alcohol use: Not Currently     Comment: 1-2 drinks per week        Family History   Problem Relation Age of Onset    Hypertension Mother     Colon Polyps Mother     Genetic Disorder Mother     Lupus Mother     Chronic Obstructive Pulmonary Disease Mother     Clotting Disorder Mother     Seasonal/Environmental Allergies Mother     Other - See Comments Mother         circulation problems, ovarian tumor, Mast Cell Activation Disorder, common variable immuno-deficiency    Seizure Disorder Mother     Migraines Mother     Squamous cell carcinoma Mother         Vulvar cancer    Substance Abuse Father     Anesthesia Reaction Sister     Thyroid Disease Sister     Other - See Comments Sister         immune disorder,     Seasonal/Environmental Allergies Sister     Clotting Disorder Sister     Migraines Sister     Seizure Disorder Sister     Low Back Problems Sister     Heart Failure Maternal Grandmother     Clotting Disorder Maternal Grandmother     Coronary Artery Disease Maternal Grandmother     Diabetes Maternal Grandmother     Hypertension Maternal Grandmother     Other - See Comments Maternal Grandmother       "   circulation problems    Other Cancer Maternal Grandfather     Leukemia Maternal Grandfather     Heart Failure Paternal Grandfather        REVIEW OF SYSTEMS:     5 point ROS negative except as noted above in HPI, including Gen., Resp., CV, GI &  system review.      PHYSICAL EXAM:   /72   Pulse 78   Resp 16   Ht 1.676 m (5' 6\")   Wt 86.2 kg (190 lb)   SpO2 99%   BMI 30.67 kg/m   Estimated body mass index is 30.67 kg/m  as calculated from the following:    Height as of this encounter: 1.676 m (5' 6\").    Weight as of this encounter: 86.2 kg (190 lb).   GENERAL APPEARANCE: healthy, alert, and no distress  MENTAL STATUS: alert  AIRWAY EXAM: Mallampatti Class II (visualization of the soft palate, fauces, and uvula)  RESP: lungs clear to auscultation - no rales, rhonchi or wheezes  CV: regular rates and rhythm      DIAGNOSTICS:    Not indicated      IMPRESSION   ASA Class 2 - Mild systemic disease        PLAN:       Plan for esophagogastroduodenoscopy (upper GI endoscopy). We discussed the risks, benefits and alternatives and the patient wished to proceed.    The above has been forwarded to the consulting provider.      Signed Electronically by: Giles Ibrahim MD  March 26, 2025    Giles Ibrahim MD  [facility]  Cell: [phone]  Office Phone: [phone]  Office Fax: [phone]        "

## 2025-03-27 LAB
PATH REPORT.COMMENTS IMP SPEC: NORMAL
PATH REPORT.COMMENTS IMP SPEC: NORMAL
PATH REPORT.FINAL DX SPEC: NORMAL
PATH REPORT.GROSS SPEC: NORMAL
PATH REPORT.MICROSCOPIC SPEC OTHER STN: NORMAL
PATH REPORT.RELEVANT HX SPEC: NORMAL
PHOTO IMAGE: NORMAL

## 2025-07-17 ENCOUNTER — PATIENT OUTREACH (OUTPATIENT)
Dept: CARE COORDINATION | Facility: CLINIC | Age: 50
End: 2025-07-17
Payer: COMMERCIAL

## 2025-08-14 ENCOUNTER — OFFICE VISIT (OUTPATIENT)
Dept: FAMILY MEDICINE | Facility: CLINIC | Age: 50
End: 2025-08-14
Payer: COMMERCIAL

## 2025-08-14 VITALS
RESPIRATION RATE: 18 BRPM | HEIGHT: 66 IN | SYSTOLIC BLOOD PRESSURE: 123 MMHG | DIASTOLIC BLOOD PRESSURE: 79 MMHG | TEMPERATURE: 98.9 F | OXYGEN SATURATION: 97 % | HEART RATE: 68 BPM | BODY MASS INDEX: 30.94 KG/M2 | WEIGHT: 192.5 LBS

## 2025-08-14 DIAGNOSIS — Z00.00 ROUTINE HISTORY AND PHYSICAL EXAMINATION OF ADULT: Primary | ICD-10-CM

## 2025-08-14 DIAGNOSIS — Z13.29 SCREENING FOR THYROID DISORDER: ICD-10-CM

## 2025-08-14 DIAGNOSIS — Z11.4 SCREENING FOR HIV (HUMAN IMMUNODEFICIENCY VIRUS): ICD-10-CM

## 2025-08-14 DIAGNOSIS — G43.909 MIGRAINE WITHOUT STATUS MIGRAINOSUS, NOT INTRACTABLE, UNSPECIFIED MIGRAINE TYPE: ICD-10-CM

## 2025-08-14 DIAGNOSIS — Z11.59 NEED FOR HEPATITIS C SCREENING TEST: ICD-10-CM

## 2025-08-14 DIAGNOSIS — Z13.220 LIPID SCREENING: ICD-10-CM

## 2025-08-14 LAB
CHOLEST SERPL-MCNC: 219 MG/DL
FASTING STATUS PATIENT QL REPORTED: YES
HCV AB SERPL QL IA: NONREACTIVE
HDLC SERPL-MCNC: 60 MG/DL
HIV 1+2 AB+HIV1 P24 AG SERPL QL IA: NONREACTIVE
LDLC SERPL CALC-MCNC: 134 MG/DL
NONHDLC SERPL-MCNC: 159 MG/DL
TRIGL SERPL-MCNC: 126 MG/DL
TSH SERPL DL<=0.005 MIU/L-ACNC: 1.47 UIU/ML (ref 0.3–4.2)

## 2025-08-14 PROCEDURE — 36415 COLL VENOUS BLD VENIPUNCTURE: CPT | Performed by: INTERNAL MEDICINE

## 2025-08-14 PROCEDURE — 90715 TDAP VACCINE 7 YRS/> IM: CPT | Performed by: INTERNAL MEDICINE

## 2025-08-14 PROCEDURE — 87389 HIV-1 AG W/HIV-1&-2 AB AG IA: CPT | Performed by: INTERNAL MEDICINE

## 2025-08-14 PROCEDURE — 99396 PREV VISIT EST AGE 40-64: CPT | Mod: 25 | Performed by: INTERNAL MEDICINE

## 2025-08-14 PROCEDURE — 3050F LDL-C >= 130 MG/DL: CPT | Performed by: INTERNAL MEDICINE

## 2025-08-14 PROCEDURE — 3078F DIAST BP <80 MM HG: CPT | Performed by: INTERNAL MEDICINE

## 2025-08-14 PROCEDURE — 90471 IMMUNIZATION ADMIN: CPT | Performed by: INTERNAL MEDICINE

## 2025-08-14 PROCEDURE — 86803 HEPATITIS C AB TEST: CPT | Performed by: INTERNAL MEDICINE

## 2025-08-14 PROCEDURE — 80061 LIPID PANEL: CPT | Performed by: INTERNAL MEDICINE

## 2025-08-14 PROCEDURE — 3074F SYST BP LT 130 MM HG: CPT | Performed by: INTERNAL MEDICINE

## 2025-08-14 PROCEDURE — 84443 ASSAY THYROID STIM HORMONE: CPT | Performed by: INTERNAL MEDICINE

## 2025-08-14 PROCEDURE — 1126F AMNT PAIN NOTED NONE PRSNT: CPT | Performed by: INTERNAL MEDICINE

## 2025-08-14 RX ORDER — NARATRIPTAN 2.5 MG/1
2.5 TABLET ORAL
Qty: 30 TABLET | Refills: 5 | Status: SHIPPED | OUTPATIENT
Start: 2025-08-14

## 2025-08-14 RX ORDER — PROMETHAZINE HYDROCHLORIDE 25 MG/1
25 TABLET ORAL EVERY 6 HOURS PRN
Qty: 30 TABLET | Refills: 5 | Status: SHIPPED | OUTPATIENT
Start: 2025-08-14

## 2025-08-14 RX ORDER — NAPROXEN SODIUM 550 MG/1
550 TABLET ORAL PRN
Qty: 30 TABLET | Refills: 2 | Status: SHIPPED | OUTPATIENT
Start: 2025-08-14

## 2025-08-14 SDOH — HEALTH STABILITY: PHYSICAL HEALTH: ON AVERAGE, HOW MANY DAYS PER WEEK DO YOU ENGAGE IN MODERATE TO STRENUOUS EXERCISE (LIKE A BRISK WALK)?: 1 DAY

## 2025-08-14 SDOH — HEALTH STABILITY: PHYSICAL HEALTH: ON AVERAGE, HOW MANY MINUTES DO YOU ENGAGE IN EXERCISE AT THIS LEVEL?: 30 MIN

## 2025-08-14 ASSESSMENT — PAIN SCALES - GENERAL: PAINLEVEL_OUTOF10: NO PAIN (0)

## 2025-08-14 ASSESSMENT — SOCIAL DETERMINANTS OF HEALTH (SDOH): HOW OFTEN DO YOU GET TOGETHER WITH FRIENDS OR RELATIVES?: TWICE A WEEK

## (undated) RX ORDER — DIPHENHYDRAMINE HYDROCHLORIDE 50 MG/ML
INJECTION, SOLUTION INTRAMUSCULAR; INTRAVENOUS
Status: DISPENSED
Start: 2025-03-26

## (undated) RX ORDER — FENTANYL CITRATE 50 UG/ML
INJECTION, SOLUTION INTRAMUSCULAR; INTRAVENOUS
Status: DISPENSED
Start: 2025-03-26

## (undated) RX ORDER — FENTANYL CITRATE 50 UG/ML
INJECTION, SOLUTION INTRAMUSCULAR; INTRAVENOUS
Status: DISPENSED
Start: 2023-06-12